# Patient Record
Sex: FEMALE | Race: WHITE | Employment: UNEMPLOYED | URBAN - METROPOLITAN AREA
[De-identification: names, ages, dates, MRNs, and addresses within clinical notes are randomized per-mention and may not be internally consistent; named-entity substitution may affect disease eponyms.]

---

## 2017-11-24 ENCOUNTER — TELEPHONE (OUTPATIENT)
Dept: FAMILY MEDICINE CLINIC | Age: 57
End: 2017-11-24

## 2017-11-24 ENCOUNTER — OFFICE VISIT (OUTPATIENT)
Dept: FAMILY MEDICINE CLINIC | Age: 57
End: 2017-11-24

## 2017-11-24 VITALS
RESPIRATION RATE: 18 BRPM | DIASTOLIC BLOOD PRESSURE: 84 MMHG | TEMPERATURE: 98.6 F | WEIGHT: 160 LBS | HEIGHT: 63 IN | OXYGEN SATURATION: 97 % | HEART RATE: 100 BPM | SYSTOLIC BLOOD PRESSURE: 141 MMHG | BODY MASS INDEX: 28.35 KG/M2

## 2017-11-24 DIAGNOSIS — Z76.89 ESTABLISHING CARE WITH NEW DOCTOR, ENCOUNTER FOR: ICD-10-CM

## 2017-11-24 DIAGNOSIS — G43.009 MIGRAINE WITHOUT AURA AND WITHOUT STATUS MIGRAINOSUS, NOT INTRACTABLE: Primary | ICD-10-CM

## 2017-11-24 DIAGNOSIS — R63.4 WEIGHT LOSS: ICD-10-CM

## 2017-11-24 DIAGNOSIS — R73.09 ELEVATED GLUCOSE: ICD-10-CM

## 2017-11-24 DIAGNOSIS — Z13.220 SCREENING CHOLESTEROL LEVEL: ICD-10-CM

## 2017-11-24 DIAGNOSIS — R79.89 ELEVATED LFTS: ICD-10-CM

## 2017-11-24 RX ORDER — FLUTICASONE PROPIONATE 50 MCG
2 SPRAY, SUSPENSION (ML) NASAL DAILY
COMMUNITY
End: 2018-09-16

## 2017-11-24 NOTE — MR AVS SNAPSHOT
Visit Information Date & Time Provider Department Dept. Phone Encounter #  
 11/24/2017 10:30 AM Grover Lei DO  [address] [facility] [phone] [id number] Your Appointments 12/8/2017  2:00 PM  
COMPLETE PHYSICAL with Farooq Anders DO [facility] ([facility]) Appt Note: well woman [address] [facility],[place] [address] [facility]  
[phone]  
  
   
 [address] [facility],[place] [address] [facility] [phone] [id number] Upcoming Health Maintenance Date Due DTaP/Tdap/Td series (1 - Tdap) 9/17/1981 PAP AKA CERVICAL CYTOLOGY 9/17/1981 BREAST CANCER SCRN MAMMOGRAM 9/17/2010 FOBT Q 1 YEAR AGE 50-75 9/17/2010 Allergies as of 11/24/2017  Review Complete On: 11/24/2017 By: Grover Lei DO Severity Noted Reaction Type Reactions Latex Medium 08/10/2012   Systemic Itching  
 inflammation Augmentin [Amoxicillin-pot Clavulanate]  11/11/2014    Hives Current Immunizations  Never Reviewed No immunizations on file. Not reviewed this visit You Were Diagnosed With   
  
 Codes Comments Migraine without aura and without status migrainosus, not intractable    -  Primary ICD-10-CM: G43.009 ICD-9-CM: 346.10 Elevated LFTs     ICD-10-CM: R79.89 ICD-9-CM: 790.6 Screening cholesterol level     ICD-10-CM: R56.336 ICD-9-CM: V77.91 Elevated glucose     ICD-10-CM: R73.09 
ICD-9-CM: 790.29 Weight loss     ICD-10-CM: R63.4 ICD-9-CM: 783.21 Establishing care with new doctor, encounter for     ICD-10-CM: Z76.89 
ICD-9-CM: V65.8 Vitals BP Pulse Temp Resp Height(growth percentile) Weight(growth percentile) 141/84 (BP 1 Location: Right arm, BP Patient Position: Sitting) 100 98.6 °F (37 °C) (Oral) 18 5' 3\" (1.6 m) 160 lb (72.6 kg) SpO2 BMI OB Status Smoking Status 97% 28.34 kg/m2 Postmenopausal Former Smoker Vitals History BMI and BSA Data Body Mass Index Body Surface Area 28.34 kg/m 2 1.8 m 2 Preferred Pharmacy Pharmacy Name Phone FOOD [facility] [address] [facility] [address] [facility] [id number] [facility] [phone] Your Updated Medication List  
  
   
This list is accurate as of: 11/24/17 11:08 AM.  Always use your most recent med list.  
  
  
  
  
 aspirin-acetaminophen-caffeine 250-250-65 mg per tablet Commonly known as:  EXCEDRIN ES Take 1 Tab by mouth every six (6) hours as needed for Pain. dihydroergotamine 0.5 mg/pump act. (4 mg/mL) nasal spray Commonly known as:  MIGRANAL  
1 spray by Nasal route as needed for Migraine. use in one nostril as directed. No more than 4 sprays in one hour FLONASE 50 mcg/actuation nasal spray Generic drug:  fluticasone 2 Sprays by Both Nostrils route daily. ZEGERID OTC 20-1.1 mg-gram capsule Generic drug:  omeprazole-sodium bicarbonate Take 1 tablet by mouth daily. Prescriptions Printed Refills  
 aspirin-acetaminophen-caffeine (EXCEDRIN ES) 250-250-65 mg per tablet 1 Sig: Take 1 Tab by mouth every six (6) hours as needed for Pain. Class: Print Route: Oral  
  
We Performed the Following HEMOGLOBIN A1C WITH EAG [34647 CPT(R)] LIPID PANEL [75636 CPT(R)] METABOLIC PANEL, COMPREHENSIVE [70442 CPT(R)] TSH RFX ON ABNORMAL TO FREE T4 [[id number] Custom] Patient Instructions Learning About Colonoscopy What is a colonoscopy? A colonoscopy is a test (also called a procedure) that lets a doctor look inside your large intestine. The doctor uses a thin, lighted tube called a colonoscope. The doctor uses it to look for small growths called polyps, colon or rectal cancer (colorectal cancer), or other problems like bleeding. During the procedure, the doctor can take samples of tissue. The samples can then be checked for cancer or other conditions. The doctor can also take out polyps. How is colonoscopy done? This procedure is done in a doctor's office or a clinic or hospital. You will get medicine to help you relax and not feel pain. Some people find that they do not remember having the test because of the medicine. The doctor gently moves the colonoscope, or scope, through the colon. The scope is also a small video camera. It lets the doctor see the colon and take pictures. A colonoscopy usually takes 30 to 45 minutes. It may take longer if the doctor has to remove polyps. How do you prepare for the procedure? You need to clean out your colon before the procedure so the doctor can see all of your colon. You may start the cleaning process a day or two before the test. This depends on which \"colon prep\" your doctor recommends. To clean your colon, you stop eating solid foods and drink only clear liquids. You can have water, tea, coffee, clear juices, clear broths, flavored ice pops, and gelatin (such as Jell-O). Do not drink anything red or purple, such as grape juice or fruit punch. And do not eat red or purple foods, such as grape ice pops or cherry gelatin. The day or night before the procedure, you drink a large amount of a special liquid. This causes loose, frequent stools. You will go to the bathroom a lot. It is very important to drink all of the colon prep liquid. If you have problems drinking the liquid, call your doctor. For many people, the prep is worse than the test. It may be uncomfortable, and you may feel hungry on the clear liquid diet. Some people do not go to work or do their usual activities on the day of the prep. Arrange to have someone take you home after the test. 
What can you expect after a colonoscopy? The nurses will watch you for 1 to 2 hours until the medicines wear off. Then you can go home. You will need a ride. Your doctor will tell you when you can eat and do your usual activities.  
Your doctor will talk to you about when you will need your next colonoscopy. The results of your test and your risk for colorectal cancer will help your doctor decide how often you need to be checked. Follow-up care is a key part of your treatment and safety. Be sure to make and go to all appointments, and call your doctor if you are having problems. It's also a good idea to know your test results and keep a list of the medicines you take. Where can you learn more? Go to http://raz-gabe.info/. Enter W271 in the search box to learn more about \"Learning About Colonoscopy. \" Current as of: May 12, 2017 Content Version: 11.4 © 2822-6596 [employer]. Care instructions adapted under license by [employer] (which disclaims liability or warranty for this information). If you have questions about a medical condition or this instruction, always ask your healthcare professional. [address] any warranty or liability for your use of this information. Introducing [facility]! David Doherty introduces Teamleader patient portal. Now you can access parts of your medical record, email your doctor's office, and request medication refills online. 1. In your internet browser, go to [email or web address]. Oncoscope/[employer] 2. Click on the First Time User? Click Here link in the Sign In box. You will see the New Member Sign Up page. 3. Enter your Teamleader Access Code exactly as it appears below. You will not need to use this code after youve completed the sign-up process. If you do not sign up before the expiration date, you must request a new code. · Teamleader Access Code: [email or web address] Expires: 2/22/2018 10:32 AM 
 
4. Enter the last four digits of your Social Security Number (xxxx) and Date of Birth (mm/dd/yyyy) as indicated and click Submit. You will be taken to the next sign-up page. 5. Create a Teamleader ID.  This will be your Teamleader login ID and cannot be changed, so think of one that is secure and easy to remember. 6. Create a VIAP password. You can change your password at any time. 7. Enter your Password Reset Question and Answer. This can be used at a later time if you forget your password. 8. Enter your e-mail address. You will receive e-mail notification when new information is available in [address] 9. Click Sign Up. You can now view and download portions of your medical record. 10. Click the Download Summary menu link to download a portable copy of your medical information. If you have questions, please visit the Frequently Asked Questions section of the VIAP website. Remember, VIAP is NOT to be used for urgent needs. For medical emergencies, dial 911. Now available from your iPhone and Android! Please provide this summary of care documentation to your next provider. Your primary care clinician is listed as Ludin Virk Iv. If you have any questions after today's visit, please call [phone].

## 2017-11-24 NOTE — PATIENT INSTRUCTIONS
Learning About Colonoscopy  What is a colonoscopy? A colonoscopy is a test (also called a procedure) that lets a doctor look inside your large intestine. The doctor uses a thin, lighted tube called a colonoscope. The doctor uses it to look for small growths called polyps, colon or rectal cancer (colorectal cancer), or other problems like bleeding. During the procedure, the doctor can take samples of tissue. The samples can then be checked for cancer or other conditions. The doctor can also take out polyps. How is colonoscopy done? This procedure is done in a doctor's office or a clinic or hospital. You will get medicine to help you relax and not feel pain. Some people find that they do not remember having the test because of the medicine. The doctor gently moves the colonoscope, or scope, through the colon. The scope is also a small video camera. It lets the doctor see the colon and take pictures. A colonoscopy usually takes 30 to 45 minutes. It may take longer if the doctor has to remove polyps. How do you prepare for the procedure? You need to clean out your colon before the procedure so the doctor can see all of your colon. You may start the cleaning process a day or two before the test. This depends on which \"colon prep\" your doctor recommends. To clean your colon, you stop eating solid foods and drink only clear liquids. You can have water, tea, coffee, clear juices, clear broths, flavored ice pops, and gelatin (such as Jell-O). Do not drink anything red or purple, such as grape juice or fruit punch. And do not eat red or purple foods, such as grape ice pops or cherry gelatin. The day or night before the procedure, you drink a large amount of a special liquid. This causes loose, frequent stools. You will go to the bathroom a lot. It is very important to drink all of the colon prep liquid. If you have problems drinking the liquid, call your doctor.   For many people, the prep is worse than the test. It may be uncomfortable, and you may feel hungry on the clear liquid diet. Some people do not go to work or do their usual activities on the day of the prep. Arrange to have someone take you home after the test.  What can you expect after a colonoscopy? The nurses will watch you for 1 to 2 hours until the medicines wear off. Then you can go home. You will need a ride. Your doctor will tell you when you can eat and do your usual activities. Your doctor will talk to you about when you will need your next colonoscopy. The results of your test and your risk for colorectal cancer will help your doctor decide how often you need to be checked. Follow-up care is a key part of your treatment and safety. Be sure to make and go to all appointments, and call your doctor if you are having problems. It's also a good idea to know your test results and keep a list of the medicines you take. Where can you learn more? Go to http://raz-gabe.info/. Enter C207 in the search box to learn more about \"Learning About Colonoscopy. \"  Current as of: May 12, 2017  Content Version: 11.4  © 6008-1842 [employer]. Care instructions adapted under license by [employer] (which disclaims liability or warranty for this information). If you have questions about a medical condition or this instruction, always ask your healthcare professional. [address] any warranty or liability for your use of this information.

## 2017-11-24 NOTE — PROGRESS NOTES
HPI:  Radha Haro is a 62 y.o. female presenting for well woman exam.     Migraines: better since menopause, has had vomiting with headaches in the past, frequently gets nauseated. Get sensitive to noise and light. Was told her liver numbers were high in the past.  Tested for hepatitis and negative. Rare use of tylenol about 2 times a year. Drinks about 3-4 drinks a year. No supplements. Drinks herbal teas occasionally. Elevated BP: when she checks it at the grocery store it's always normal, headaches makes it go up as well as at the doctors office. Has a headache today. Lost 40# in 2 years. Stopped drinking soda and started drinking a lot of water. Doesn't exercise. Allergies- reviewed: Allergies   Allergen Reactions    Latex Itching     inflammation    Augmentin [Amoxicillin-Pot Clavulanate] Hives         Medications- reviewed:   Current Outpatient Prescriptions   Medication Sig    fluticasone (FLONASE) 50 mcg/actuation nasal spray 2 Sprays by Both Nostrils route daily.  aspirin-acetaminophen-caffeine (EXCEDRIN ES) 250-250-65 mg per tablet Take 1 Tab by mouth every six (6) hours as needed for Pain.  omeprazole-sodium bicarbonate (ZEGERID OTC) 20-1.1 mg-gram cap Take 1 tablet by mouth daily.  dihydroergotamine (MIGRANAL) 0.5 mg/pump act. (4 mg/mL) nasal spray 1 spray by Nasal route as needed for Migraine. use in one nostril as directed. No more than 4 sprays in one hour     No current facility-administered medications for this visit. Past Medical History- reviewed:  Past Medical History:   Diagnosis Date    Sarcoidosis     Stickler's syndrome     daughter has-pt may have (genetic connective tissue disorder)     Dx Sarcoid 1991    Past Surgical History- reviewed:   Past Surgical History:   Procedure Laterality Date    HX ORTHOPAEDIC Right     wrist         Family History - reviewed:  History reviewed. No pertinent family history.       Social History - reviewed:  Social History     Social History    Marital status:      Spouse name: N/A    Number of children: N/A    Years of education: N/A     Occupational History    Not on file. Social History Main Topics    Smoking status: Former Smoker    Smokeless tobacco: Never Used    Alcohol use No    Drug use: No    Sexual activity: Yes     Partners: Male     Other Topics Concern    Not on file     Social History Narrative         Immunizations - reviewed: There is no immunization history on file for this patient. Flu: declines   Tdap: not sure, but >10 years, wants today       Health Maintenance reviewed -  Pap smear \"been awhile\"   Mammogram \"been awhile\"   Colonoscopy has done the cards in the past, has never had a colonoscopy   HIV testing neg in the past   Hepatitis C testing neg in the past       Review of Systems   CONSTITUTIONAL: Denies: fever, chills  ENT: allergy symptoms, congestion   NEURO: headache  BREASTS: No masses or nipple discharge      Physical Exam  Visit Vitals    /84 (BP 1 Location: Right arm, BP Patient Position: Sitting)    Pulse 100    Temp 98.6 °F (37 °C) (Oral)    Resp 18    Ht 5' 3\" (1.6 m)    Wt 160 lb (72.6 kg)    SpO2 97%    BMI 28.34 kg/m2       General appearance - alert, well appearing, and in no distress  Neck - supple, no significant adenopathy, thyroid exam: thyroid is normal in size without nodules or tenderness  Chest - clear to auscultation, no wheezes, rales or rhonchi, symmetric air entry  Heart - normal rate, regular rhythm, normal S1, S2, no murmurs, rubs, clicks or gallops  Neurological - alert, oriented, normal speech, no focal findings or movement disorder noted  Extremities - no pedal edema noted    Assessment/Plan:    ICD-10-CM ICD-9-CM    1. Migraine without aura and without status migrainosus, not intractable G43.009 346.10 aspirin-acetaminophen-caffeine (EXCEDRIN ES) 250-250-65 mg per tablet   2.  Elevated LFTs R79.89 790.6 METABOLIC PANEL, COMPREHENSIVE   3. Screening cholesterol level Z13.220 V77.91 LIPID PANEL   4. Elevated glucose R73.09 790.29 HEMOGLOBIN A1C WITH EAG   5. Weight loss R63.4 783.21 TSH RFX ON ABNORMAL TO FREE T4   6. Establishing care with new doctor, encounter for Z76.89 V65.8      · Will return for a wellness exam, labs today and will go over them at next visit  · Hx of migraines, states she has tried several preventative meds as well as Imitrex without luck. Refinery29an Spine has worked well with [facility] in the past.  Will hold off on Katheran Spine for now until LFTs back. Okay to use Fioricet in moderation for now, but if LFTs significantly elevated will have to find another option given the tylenol component. · At wellness visit will need: pap, colonoscopy referral, mammogram, tdap, (declines flu)      Follow-up Disposition: Not on File      I have discussed the diagnosis with the patient and the intended plan as seen in the above orders. The patient has received an after-visit summary and questions were answered concerning future plans. I have discussed medication side effects and warnings with the patient as well. Informed pt to return to the office if new symptoms arise.       Farooq Anders, DO

## 2017-11-24 NOTE — TELEPHONE ENCOUNTER
[facility],  [phone]    Called to say the patient told them that the medication which was to have been ordered is fioricet and not aspirin as that is over the counter.     aspirin-acetaminophen-caffeine (EXCEDRIN ES) 250-250-65 mg per tablet

## 2017-11-24 NOTE — PROGRESS NOTES
Chief Complaint   Patient presents with   [facility]     1. Have you been to the ER, urgent care clinic since your last visit? Hospitalized since your last visit? No    2. Have you seen or consulted any other health care providers outside of the [address] [place] [address] since your last visit? Include any pap smears or colon screening.  No

## 2017-11-25 LAB
ALBUMIN SERPL-MCNC: 4.5 G/DL (ref 3.5–5.5)
ALBUMIN/GLOB SERPL: 1.5 {RATIO} (ref 1.2–2.2)
ALP SERPL-CCNC: 123 IU/L (ref 39–117)
ALT SERPL-CCNC: 24 IU/L (ref 0–32)
AST SERPL-CCNC: 20 IU/L (ref 0–40)
BILIRUB SERPL-MCNC: 0.6 MG/DL (ref 0–1.2)
BUN SERPL-MCNC: 8 MG/DL (ref 6–24)
BUN/CREAT SERPL: 14 (ref 9–23)
CALCIUM SERPL-MCNC: 9.9 MG/DL (ref 8.7–10.2)
CHLORIDE SERPL-SCNC: 95 MMOL/L (ref 96–106)
CHOLEST SERPL-MCNC: 241 MG/DL (ref 100–199)
CO2 SERPL-SCNC: 28 MMOL/L (ref 18–29)
CREAT SERPL-MCNC: 0.56 MG/DL (ref 0.57–1)
EST. AVERAGE GLUCOSE BLD GHB EST-MCNC: 318 MG/DL
GFR SERPLBLD CREATININE-BSD FMLA CKD-EPI: 104 ML/MIN/1.73
GFR SERPLBLD CREATININE-BSD FMLA CKD-EPI: 120 ML/MIN/1.73
GLOBULIN SER CALC-MCNC: 3 G/DL (ref 1.5–4.5)
GLUCOSE SERPL-MCNC: 314 MG/DL (ref 65–99)
HBA1C MFR BLD: 12.7 % (ref 4.8–5.6)
HDLC SERPL-MCNC: 45 MG/DL
INTERPRETATION, 910389: NORMAL
LDLC SERPL CALC-MCNC: 128 MG/DL (ref 0–99)
POTASSIUM SERPL-SCNC: 5.5 MMOL/L (ref 3.5–5.2)
PROT SERPL-MCNC: 7.5 G/DL (ref 6–8.5)
SODIUM SERPL-SCNC: 138 MMOL/L (ref 134–144)
TRIGL SERPL-MCNC: 341 MG/DL (ref 0–149)
TSH SERPL DL<=0.005 MIU/L-ACNC: 0.82 UIU/ML (ref 0.45–4.5)
VLDLC SERPL CALC-MCNC: 68 MG/DL (ref 5–40)

## 2017-11-27 NOTE — PROGRESS NOTES
Newly diagnosed diabetic. Potassium slightly elevated. Will plan to recheck at visit next week. ATtempted to call pt to discuss but no answer, left voicemail. Will send copy of labs with explanation in letter in the mail.

## 2017-12-04 ENCOUNTER — TELEPHONE (OUTPATIENT)
Dept: FAMILY MEDICINE CLINIC | Age: 57
End: 2017-12-04

## 2017-12-04 NOTE — TELEPHONE ENCOUNTER
Patient calling she never got her results in the mail even though we are showing the Dr mailed it out, She would like the Dr or Nurse to call her to discuss the results, thank you.     Call patient at [phone]

## 2017-12-05 ENCOUNTER — HOSPITAL ENCOUNTER (EMERGENCY)
Age: 57
Discharge: HOME | End: 2017-12-06
Attending: EMERGENCY MEDICINE | Admitting: EMERGENCY MEDICINE
Payer: COMMERCIAL

## 2017-12-05 ENCOUNTER — APPOINTMENT (OUTPATIENT)
Dept: GENERAL RADIOLOGY | Age: 57
End: 2017-12-05
Attending: EMERGENCY MEDICINE
Payer: COMMERCIAL

## 2017-12-05 VITALS
SYSTOLIC BLOOD PRESSURE: 116 MMHG | BODY MASS INDEX: 28.35 KG/M2 | RESPIRATION RATE: 16 BRPM | HEART RATE: 95 BPM | DIASTOLIC BLOOD PRESSURE: 81 MMHG | HEIGHT: 63 IN | TEMPERATURE: 98.4 F | WEIGHT: 160 LBS | OXYGEN SATURATION: 96 %

## 2017-12-05 DIAGNOSIS — G43.809 OTHER MIGRAINE WITHOUT STATUS MIGRAINOSUS, NOT INTRACTABLE: Primary | ICD-10-CM

## 2017-12-05 LAB
ALBUMIN SERPL-MCNC: 3.9 G/DL (ref 3.5–5)
ALBUMIN/GLOB SERPL: 1 {RATIO} (ref 1.1–2.2)
ALP SERPL-CCNC: 115 U/L (ref 45–117)
ALT SERPL-CCNC: 37 U/L (ref 12–78)
ANION GAP SERPL CALC-SCNC: 12 MMOL/L (ref 5–15)
AST SERPL-CCNC: 20 U/L (ref 15–37)
BASOPHILS # BLD: 0 K/UL (ref 0–0.1)
BASOPHILS NFR BLD: 1 % (ref 0–1)
BILIRUB SERPL-MCNC: 0.5 MG/DL (ref 0.2–1)
BUN SERPL-MCNC: 10 MG/DL (ref 6–20)
BUN/CREAT SERPL: 17 (ref 12–20)
CALCIUM SERPL-MCNC: 9.1 MG/DL (ref 8.5–10.1)
CHLORIDE SERPL-SCNC: 96 MMOL/L (ref 97–108)
CO2 SERPL-SCNC: 27 MMOL/L (ref 21–32)
CREAT SERPL-MCNC: 0.6 MG/DL (ref 0.55–1.02)
D DIMER PPP FEU-MCNC: 0.22 MG/L FEU (ref 0–0.65)
EOSINOPHIL # BLD: 0.2 K/UL (ref 0–0.4)
EOSINOPHIL NFR BLD: 2 % (ref 0–7)
ERYTHROCYTE [DISTWIDTH] IN BLOOD BY AUTOMATED COUNT: 12.9 % (ref 11.5–14.5)
GLOBULIN SER CALC-MCNC: 3.8 G/DL (ref 2–4)
GLUCOSE SERPL-MCNC: 288 MG/DL (ref 65–100)
HCT VFR BLD AUTO: 42.2 % (ref 35–47)
HGB BLD-MCNC: 14.7 G/DL (ref 11.5–16)
LYMPHOCYTES # BLD: 2.9 K/UL (ref 0.8–3.5)
LYMPHOCYTES NFR BLD: 45 % (ref 12–49)
MCH RBC QN AUTO: 28.9 PG (ref 26–34)
MCHC RBC AUTO-ENTMCNC: 34.8 G/DL (ref 30–36.5)
MCV RBC AUTO: 82.9 FL (ref 80–99)
MONOCYTES # BLD: 0.3 K/UL (ref 0–1)
MONOCYTES NFR BLD: 5 % (ref 5–13)
NEUTS SEG # BLD: 3 K/UL (ref 1.8–8)
NEUTS SEG NFR BLD: 47 % (ref 32–75)
PLATELET # BLD AUTO: 199 K/UL (ref 150–400)
POTASSIUM SERPL-SCNC: 3.8 MMOL/L (ref 3.5–5.1)
PROT SERPL-MCNC: 7.7 G/DL (ref 6.4–8.2)
RBC # BLD AUTO: 5.09 M/UL (ref 3.8–5.2)
SODIUM SERPL-SCNC: 135 MMOL/L (ref 136–145)
TROPONIN I SERPL-MCNC: <0.04 NG/ML
WBC # BLD AUTO: 6.5 K/UL (ref 3.6–11)

## 2017-12-05 PROCEDURE — 74011250636 HC RX REV CODE- 250/636: Performed by: EMERGENCY MEDICINE

## 2017-12-05 PROCEDURE — 84484 ASSAY OF TROPONIN QUANT: CPT | Performed by: EMERGENCY MEDICINE

## 2017-12-05 PROCEDURE — 71020 XR CHEST PA LAT: CPT

## 2017-12-05 PROCEDURE — 96365 THER/PROPH/DIAG IV INF INIT: CPT

## 2017-12-05 PROCEDURE — 96361 HYDRATE IV INFUSION ADD-ON: CPT

## 2017-12-05 PROCEDURE — 99283 EMERGENCY DEPT VISIT LOW MDM: CPT

## 2017-12-05 PROCEDURE — 36415 COLL VENOUS BLD VENIPUNCTURE: CPT | Performed by: EMERGENCY MEDICINE

## 2017-12-05 PROCEDURE — 85025 COMPLETE CBC W/AUTO DIFF WBC: CPT | Performed by: EMERGENCY MEDICINE

## 2017-12-05 PROCEDURE — 96375 TX/PRO/DX INJ NEW DRUG ADDON: CPT

## 2017-12-05 PROCEDURE — 93005 ELECTROCARDIOGRAM TRACING: CPT

## 2017-12-05 PROCEDURE — 80053 COMPREHEN METABOLIC PANEL: CPT | Performed by: EMERGENCY MEDICINE

## 2017-12-05 PROCEDURE — 85379 FIBRIN DEGRADATION QUANT: CPT | Performed by: EMERGENCY MEDICINE

## 2017-12-05 RX ORDER — MAGNESIUM SULFATE HEPTAHYDRATE 40 MG/ML
2 INJECTION, SOLUTION INTRAVENOUS
Status: COMPLETED | OUTPATIENT
Start: 2017-12-05 | End: 2017-12-05

## 2017-12-05 RX ORDER — DEXAMETHASONE SODIUM PHOSPHATE 10 MG/ML
10 INJECTION INTRAMUSCULAR; INTRAVENOUS
Status: COMPLETED | OUTPATIENT
Start: 2017-12-05 | End: 2017-12-05

## 2017-12-05 RX ORDER — PROCHLORPERAZINE EDISYLATE 5 MG/ML
10 INJECTION INTRAMUSCULAR; INTRAVENOUS
Status: COMPLETED | OUTPATIENT
Start: 2017-12-05 | End: 2017-12-05

## 2017-12-05 RX ADMIN — MAGNESIUM SULFATE IN WATER 2 G: 40 INJECTION, SOLUTION INTRAVENOUS at 21:41

## 2017-12-05 RX ADMIN — DEXAMETHASONE SODIUM PHOSPHATE 10 MG: 10 INJECTION, SOLUTION INTRAMUSCULAR; INTRAVENOUS at 21:41

## 2017-12-05 RX ADMIN — SODIUM CHLORIDE 1000 ML: 900 INJECTION, SOLUTION INTRAVENOUS at 21:41

## 2017-12-05 RX ADMIN — PROCHLORPERAZINE EDISYLATE 10 MG: 5 INJECTION INTRAMUSCULAR; INTRAVENOUS at 21:41

## 2017-12-05 NOTE — TELEPHONE ENCOUNTER
Called patient and she states she already received letter. She did not have any questions.  Patient states she has an appointment already with Dr. Shantelle Neely.

## 2017-12-06 LAB
ATRIAL RATE: 100 BPM
CALCULATED P AXIS, ECG09: 39 DEGREES
CALCULATED R AXIS, ECG10: -12 DEGREES
CALCULATED T AXIS, ECG11: 48 DEGREES
DIAGNOSIS, 93000: NORMAL
P-R INTERVAL, ECG05: 152 MS
Q-T INTERVAL, ECG07: 336 MS
QRS DURATION, ECG06: 74 MS
QTC CALCULATION (BEZET), ECG08: 433 MS
VENTRICULAR RATE, ECG03: 100 BPM

## 2017-12-06 NOTE — ED NOTES
Continues with pain to frontal and occipital head but improved, denies nausea, ambulated to BR steadily, fluid bolus in process.  Will d/c once fluids complete

## 2017-12-06 NOTE — ED NOTES
10:09 PM  Change of shift. Care of patient taken over from Dr. Corby Mitchell; H&P reviewed, handoff complete. Awaiting labs/imaging/consultant. 10:51 PM  Pt feeling better. Discussed results and plan with patient. Patient will be discharged home with PCP and neurology followup. Patient instructed to return to the emergency room for any worsening symptoms or any other concerns.

## 2017-12-06 NOTE — DISCHARGE INSTRUCTIONS
Migraine Headache: Care Instructions  Your Care Instructions  Migraines are painful, throbbing headaches that often start on one side of the head. They may cause nausea and vomiting and make you sensitive to light, sound, or smell. Without treatment, migraines can last from 4 hours to a few days. Medicines can help prevent migraines or stop them after they have started. Your doctor can help you find which ones work best for you. Follow-up care is a key part of your treatment and safety. Be sure to make and go to all appointments, and call your doctor if you are having problems. It's also a good idea to know your test results and keep a list of the medicines you take. How can you care for yourself at home? · Do not drive if you have taken a prescription pain medicine. · Rest in a quiet, dark room until your headache is gone. Close your eyes, and try to relax or go to sleep. Don't watch TV or read. · Put a cold, moist cloth or cold pack on the painful area for 10 to 20 minutes at a time. Put a thin cloth between the cold pack and your skin. · Use a warm, moist towel or a heating pad set on low to relax tight shoulder and neck muscles. · Have someone gently massage your neck and shoulders. · Take your medicines exactly as prescribed. Call your doctor if you think you are having a problem with your medicine. You will get more details on the specific medicines your doctor prescribes. · Be careful not to take pain medicine more often than the instructions allow. You could get worse or more frequent headaches when the medicine wears off. To prevent migraines  · Keep a headache diary so you can figure out what triggers your headaches. Avoiding triggers may help you prevent headaches. Record when each headache began, how long it lasted, and what the pain was like.  (Was it throbbing, aching, stabbing, or dull?) Write down any other symptoms you had with the headache, such as nausea, flashing lights or dark spots, or sensitivity to bright light or loud noise. Note if the headache occurred near your period. List anything that might have triggered the headache. Triggers may include certain foods (chocolate, cheese, wine) or odors, smoke, bright light, stress, or lack of sleep. · If your doctor has prescribed medicine for your migraines, take it as directed. You may have medicine that you take only when you get a migraine and medicine that you take all the time to help prevent migraines. ¨ If your doctor has prescribed medicine for when you get a headache, take it at the first sign of a migraine, unless your doctor has given you other instructions. ¨ If your doctor has prescribed medicine to prevent migraines, take it exactly as prescribed. Call your doctor if you think you are having a problem with your medicine. · Find healthy ways to deal with stress. Migraines are most common during or right after stressful times. Take time to relax before and after you do something that has caused a migraine in the past.  · Try to keep your muscles relaxed by keeping good posture. Check your jaw, face, neck, and shoulder muscles for tension. Try to relax them. When you sit at a desk, change positions often. And make sure to stretch for 30 seconds each hour. · Get plenty of sleep and exercise. · Eat meals on a regular schedule. Avoid foods and drinks that often trigger migraines. These include chocolate, alcohol (especially red wine and port), aspartame, monosodium glutamate (MSG), and some additives found in foods (such as hot dogs, zurita, cold cuts, aged cheeses, and pickled foods). · Limit caffeine. Don't drink too much coffee, tea, or soda. But don't quit caffeine suddenly. That can also give you migraines. · Do not smoke or allow others to smoke around you. If you need help quitting, talk to your doctor about stop-smoking programs and medicines. These can increase your chances of quitting for good.   · If you are taking birth control pills or hormone therapy, talk to your doctor about whether they are triggering your migraines. When should you call for help? Call 911 anytime you think you may need emergency care. For example, call if:  ? · You have signs of a stroke. These may include:  ¨ Sudden numbness, paralysis, or weakness in your face, arm, or leg, especially on only one side of your body. ¨ Sudden vision changes. ¨ Sudden trouble speaking. ¨ Sudden confusion or trouble understanding simple statements. ¨ Sudden problems with walking or balance. ¨ A sudden, severe headache that is different from past headaches. ?Call your doctor now or seek immediate medical care if:  ? · You have new or worse nausea and vomiting. ? · You have a new or higher fever. ? · Your headache gets much worse. ? Watch closely for changes in your health, and be sure to contact your doctor if:  ? · You are not getting better after 2 days (48 hours). Where can you learn more? Go to http://raz-gabe.info/. Enter J337 in the search box to learn more about \"Migraine Headache: Care Instructions. \"  Current as of: October 14, 2016  Content Version: 11.4  © 1267-9038 [employer]. Care instructions adapted under license by [employer] (which disclaims liability or warranty for this information). If you have questions about a medical condition or this instruction, always ask your healthcare professional. [place] [address] any warranty or liability for your use of this information.

## 2017-12-06 NOTE — ED PROVIDER NOTES
HPI Comments: Ms. Kala Dykes is a 55-year-old female with past medical history sarcoidosis, migraine headaches, presenting with complaints of gradual onset by frontal headache, but his began to involve the posterior regions, achy in nature, associated with nausea, vomiting, without auditory or visual changes, patient complains of photophobia and phonophobia. She states that headache is typical of severe headaches, however worse than usual, refractory to home Fioricet, and Excedrin which usually resolves her headache. She denies new trauma, denies bleeding disorders, fevers, visual disturbances or auditory disturbances as above. Patient is a 62 y.o. female presenting with headaches. The history is provided by the patient and the spouse. No  was used. Headache    This is a recurrent problem. The current episode started 6 to 12 hours ago. The problem occurs constantly. The problem has been gradually worsening. The headache is aggravated by photophobia, vomiting and nausea. The pain is located in the bilateral region. The quality of the pain is described as throbbing. The pain is at a severity of 10/10. The pain is severe. Associated symptoms include chest pressure, nausea and vomiting. Pertinent negatives include no anorexia, no fever, no malaise/fatigue, no near-syncope, no orthopnea, no palpitations, no syncope, no shortness of breath, no weakness, no tingling, no dizziness and no visual change. She has tried NSAIDs and oral narcotic analgesics for the symptoms. The treatment provided mild relief. Past Medical History:   Diagnosis Date    Neurological disorder     migraines    Sarcoidosis     Stickler's syndrome     daughter has-pt may have (genetic connective tissue disorder)       Past Surgical History:   Procedure Laterality Date    HX ORTHOPAEDIC Right     wrist         History reviewed. No pertinent family history.     Social History     Social History    Marital status:      Spouse name: N/A    Number of children: N/A    Years of education: N/A     Occupational History    Not on file. Social History Main Topics    Smoking status: Former Smoker    Smokeless tobacco: Never Used    Alcohol use No    Drug use: No    Sexual activity: Yes     Partners: Male     Other Topics Concern    Not on file     Social History Narrative         ALLERGIES: Latex and Augmentin [amoxicillin-pot clavulanate]    Review of Systems   Constitutional: Negative for activity change, chills, fever and malaise/fatigue. HENT: Negative for nosebleeds, sore throat, trouble swallowing and voice change. Eyes: Negative for visual disturbance. Respiratory: Positive for chest tightness. Negative for shortness of breath. Cardiovascular: Positive for chest pain. Negative for palpitations, orthopnea, syncope and near-syncope. Gastrointestinal: Positive for nausea and vomiting. Negative for abdominal pain, anorexia, constipation and diarrhea. Genitourinary: Negative for difficulty urinating, dysuria, hematuria and urgency. Musculoskeletal: Negative for back pain, neck pain and neck stiffness. Skin: Negative for color change. Allergic/Immunologic: Negative for immunocompromised state. Neurological: Positive for headaches. Negative for dizziness, tingling, tremors, seizures, syncope, facial asymmetry, speech difficulty, weakness, light-headedness and numbness. Psychiatric/Behavioral: Negative for behavioral problems, confusion, hallucinations, self-injury and suicidal ideas. Vitals:    12/05/17 2047   BP: (!) 169/97   Pulse: (!) 108   Resp: 18   Temp: 98.4 °F (36.9 °C)   SpO2: 97%   Weight: 72.6 kg (160 lb)   Height: 5' 3\" (1.6 m)            Physical Exam   Constitutional: She is oriented to person, place, and time. She appears well-developed and well-nourished. No distress. HENT:   Head: Normocephalic and atraumatic. Eyes: Pupils are equal, round, and reactive to light. Neck: Normal range of motion. Neck supple. Cardiovascular: Normal rate, regular rhythm and normal heart sounds. Exam reveals no gallop and no friction rub. No murmur heard. Pulmonary/Chest: Effort normal and breath sounds normal. No respiratory distress. She has no wheezes. Abdominal: Soft. Bowel sounds are normal. She exhibits no distension. There is no tenderness. There is no rebound and no guarding. Musculoskeletal: Normal range of motion. Neurological: She is alert and oriented to person, place, and time. She has normal strength. No cranial nerve deficit or sensory deficit. GCS eye subscore is 4. GCS verbal subscore is 5. GCS motor subscore is 6. Skin: Skin is warm. No rash noted. She is not diaphoretic. Psychiatric: She has a normal mood and affect. Her behavior is normal. Judgment and thought content normal.   Nursing note and vitals reviewed. MDM  ED Course     This is a 60-year-old female past medical history, review of systems, physical exam as above, presenting with complaints of gradual onset, headache, initially frontal, now posterior, or blurred to previous headaches. Headache is not acute in onset, patient is not febrile, patient states she really presents to emergency department for headaches. Physical exam is notable for uncomfortable-appearing elderly female, in no acute distress, with clear breath sounds auscultation, regular rate and rhythm without murmurs gallops or rubs, soft nontender abdomen, pulse equal reactive to light, nonfocal neurologic exam.  She does complain of some chest and, that she thinks is associated with her vomiting. Plan to obtain CMP, CBC, cardiac enzymes, provide my during cocktail, and reassess. Procedures    BESIDE SIGN OUT:  22:00 PM  Discussed pt's hx, disposition, and available diagnostic and imaging results with Dr. Jennifer Goodson at bedside with the patient. Reviewed care plans. Both providers and patient are in agreement with care plan.   Rylie Guillaume is transferring care of the pt to Dr. Erlin Stokes at this time.

## 2017-12-06 NOTE — ED TRIAGE NOTES
Pt arrives with c/o of headache onset 14:00, chest pain/tightness for one hour accompanied by nausea.

## 2017-12-11 ENCOUNTER — OFFICE VISIT (OUTPATIENT)
Dept: FAMILY MEDICINE CLINIC | Age: 57
End: 2017-12-11

## 2017-12-11 ENCOUNTER — TELEPHONE (OUTPATIENT)
Dept: FAMILY MEDICINE CLINIC | Age: 57
End: 2017-12-11

## 2017-12-11 VITALS
WEIGHT: 158.6 LBS | RESPIRATION RATE: 16 BRPM | TEMPERATURE: 98.5 F | OXYGEN SATURATION: 93 % | DIASTOLIC BLOOD PRESSURE: 89 MMHG | BODY MASS INDEX: 28.1 KG/M2 | HEART RATE: 109 BPM | HEIGHT: 63 IN | SYSTOLIC BLOOD PRESSURE: 143 MMHG

## 2017-12-11 DIAGNOSIS — Z01.419 ENCOUNTER FOR WELL WOMAN EXAM WITH ROUTINE GYNECOLOGICAL EXAM: Primary | ICD-10-CM

## 2017-12-11 DIAGNOSIS — Z12.9 CANCER SCREENING: ICD-10-CM

## 2017-12-11 DIAGNOSIS — Z23 ENCOUNTER FOR IMMUNIZATION: ICD-10-CM

## 2017-12-11 DIAGNOSIS — R03.0 ELEVATED BP WITHOUT DIAGNOSIS OF HYPERTENSION: ICD-10-CM

## 2017-12-11 DIAGNOSIS — D86.9 SARCOIDOSIS: ICD-10-CM

## 2017-12-11 DIAGNOSIS — E11.65 UNCONTROLLED TYPE 2 DIABETES MELLITUS WITH HYPERGLYCEMIA, WITHOUT LONG-TERM CURRENT USE OF INSULIN (HCC): ICD-10-CM

## 2017-12-11 DIAGNOSIS — G43.909 MIGRAINE WITHOUT STATUS MIGRAINOSUS, NOT INTRACTABLE, UNSPECIFIED MIGRAINE TYPE: ICD-10-CM

## 2017-12-11 DIAGNOSIS — N81.10 FEMALE BLADDER PROLAPSE: ICD-10-CM

## 2017-12-11 DIAGNOSIS — K21.9 GASTROESOPHAGEAL REFLUX DISEASE WITHOUT ESOPHAGITIS: ICD-10-CM

## 2017-12-11 DIAGNOSIS — E78.2 MIXED HYPERLIPIDEMIA: ICD-10-CM

## 2017-12-11 RX ORDER — INSULIN PUMP SYRINGE, 3 ML
EACH MISCELLANEOUS
Qty: 1 KIT | Refills: 0 | Status: SHIPPED | OUTPATIENT
Start: 2017-12-11 | End: 2018-09-16

## 2017-12-11 RX ORDER — METFORMIN HYDROCHLORIDE 500 MG/1
500 TABLET, EXTENDED RELEASE ORAL
Qty: 30 TAB | Refills: 0 | Status: SHIPPED | OUTPATIENT
Start: 2017-12-11 | End: 2017-12-18 | Stop reason: SDUPTHER

## 2017-12-11 RX ORDER — LANCETS
EACH MISCELLANEOUS
Qty: 1 EACH | Refills: 11 | Status: SHIPPED | OUTPATIENT
Start: 2017-12-11 | End: 2018-09-16

## 2017-12-11 NOTE — PATIENT INSTRUCTIONS
Vaccine Information Statement     Tdap (Tetanus, Diphtheria, Pertussis) Vaccine: What You Need to Know    Many Vaccine Information Statements are available in German and other languages. See www.immunize.org/vis. Hojas de Información Sobre Vacunas están disponibles en español y en muchos otros idiomas. Visite SilvioScale.si    1. Why get vaccinated? Tetanus, diphtheria, and pertussis are very serious diseases. Tdap vaccine can protect us from these diseases. And, Tdap vaccine given to pregnant women can protect  babies against pertussis. TETANUS (Lockjaw) is rare in the [facility] today. It causes painful muscle tightening and stiffness, usually all over the body.  It can lead to tightening of muscles in the head and neck so you cant open your mouth, swallow, or sometimes even breathe. Tetanus kills about 1 out of 10 people who are infected even after receiving the best medical care. DIPHTHERIA is also rare in the [facility] today. It can cause a thick coating to form in the back of the throat.  It can lead to breathing problems, heart failure, paralysis, and death. PERTUSSIS (Whooping Cough) causes severe coughing spells, which can cause difficulty breathing, vomiting, and disturbed sleep.  It can also lead to weight loss, incontinence, and rib fractures. Up to 2 in 100 adolescents and 5 in 100 adults with pertussis are hospitalized or have complications, which could include pneumonia or death. These diseases are caused by bacteria. Diphtheria and pertussis are spread from person to person through secretions from coughing or sneezing. Tetanus enters the body through cuts, scratches, or wounds. Before vaccines, as many as 200,000 cases of diphtheria, 200,000 cases of pertussis, and hundreds of cases of tetanus, were reported in the [place] each year.  Since vaccination began, reports of cases for tetanus and diphtheria have dropped by about 99% and for pertussis by about 80%. 2. Tdap vaccine    Tdap vaccine can protect adolescents and adults from tetanus, diphtheria, and pertussis. One dose of Tdap is routinely given at age 6 or 15. People who did not get Tdap at that age should get it as soon as possible. Tdap is especially important for health care professionals and anyone having close contact with a baby younger than 12 months. Pregnant women should get a dose of Tdap during every pregnancy, to protect the  from pertussis. Infants are most at risk for severe, life-threatening complications from pertussis. Another vaccine, called Td, protects against tetanus and diphtheria, but not pertussis. A Td booster should be given every 10 years. Tdap may be given as one of these boosters if you have never gotten Tdap before. Tdap may also be given after a severe cut or burn to prevent tetanus infection. Your doctor or the person giving you the vaccine can give you more information. Tdap may safely be given at the same time as other vaccines. 3. Some people should not get this vaccine     A person who has ever had a life-threatening allergic reaction after a previous dose of any diphtheria, tetanus or pertussis containing vaccine, OR has a severe allergy to any part of this vaccine, should not get Tdap vaccine. Tell the person giving the vaccine about any severe allergies.  Anyone who had coma or long repeated seizures within 7 days after a childhood dose of DTP or DTaP, or a previous dose of Tdap, should not get Tdap, unless a cause other than the vaccine was found. They can still get Td.  Talk to your doctor if you:  - have seizures or another nervous system problem,  - had severe pain or swelling after any vaccine containing diphtheria, tetanus or pertussis,   - ever had a condition called Guillain Barré Syndrome (GBS),  - arent feeling well on the day the shot is scheduled.     4. Risks    With any medicine, including vaccines, there is a chance of side effects. These are usually mild and go away on their own. Serious reactions are also possible but are rare. Most people who get Tdap vaccine do not have any problems with it. Mild Problems following Tdap  (Did not interfere with activities)   Pain where the shot was given (about 3 in 4 adolescents or 2 in 3 adults)   Redness or swelling where the shot was given (about 1 person in 5)   Mild fever of at least 100.4°F (up to about 1 in 25 adolescents or 1 in 100 adults)   Headache (about 3 or 4 people in 10)   Tiredness (about 1 person in 3 or 4)   Nausea, vomiting, diarrhea, stomach ache (up to 1 in 4 adolescents or 1 in 10 adults)   Chills,  sore joints (about 1 person in 10)   Body aches (about 1 person in 3 or 4)    Rash, swollen glands (uncommon)    Moderate Problems following Tdap  (Interfered with activities, but did not require medical attention)   Pain where the shot was given (up to 1 in 5 or 6)    Redness or swelling where the shot was given (up to about 1 in 16 adolescents or 1 in 12 adults)   Fever over 102°F (about 1 in 100 adolescents or 1 in 250 adults)   Headache (about 1 in 7 adolescents or 1 in 10 adults)   Nausea, vomiting, diarrhea, stomach ache (up to 1 or 3 people in 100)   Swelling of the entire arm where the shot was given (up to about 1 in 500). Severe Problems following Tdap  (Unable to perform usual activities; required medical attention)   Swelling, severe pain, bleeding, and redness in the arm where the shot was given (rare). Problems that could happen after any vaccine:     People sometimes faint after a medical procedure, including vaccination. Sitting or lying down for about 15 minutes can help prevent fainting, and injuries caused by a fall. Tell your doctor if you feel dizzy, or have vision changes or ringing in the ears.      Some people get severe pain in the shoulder and have difficulty moving the arm where a shot was given. This happens very rarely.  Any medication can cause a severe allergic reaction. Such reactions from a vaccine are very rare, estimated at fewer than 1 in a million doses, and would happen within a few minutes to a few hours after the vaccination. As with any medicine, there is a very remote chance of a vaccine causing a serious injury or death. The safety of vaccines is always being monitored. For more information, visit: www.cdc.gov/vaccinesafety/    5. What if there is a serious problem? What should I look for?  Look for anything that concerns you, such as signs of a severe allergic reaction, very high fever, or unusual behavior.  Signs of a severe allergic reaction can include hives, swelling of the face and throat, difficulty breathing, a fast heartbeat, dizziness, and weakness. These would usually start a few minutes to a few hours after the vaccination. What should I do?  If you think it is a severe allergic reaction or other emergency that cant wait, call 9-1-1 or get the person to the nearest hospital. Otherwise, call your doctor.  Afterward, the reaction should be reported to the Vaccine Adverse Event Reporting System (VAERS). Your doctor might file this report, or you can do it yourself through the VAERS web site at www.vaers. [facility].gov, or by calling 8-983.625.3933. [employer] does not give medical advice. 6. The National Vaccine Injury Compensation Program    The [facility] Vaccine Injury Compensation Program (VICP) is a federal program that was created to compensate people who may have been injured by certain vaccines. Persons who believe they may have been injured by a vaccine can learn about the program and about filing a claim by calling 5-877.771.3244 or visiting the [employer] website at www.[facility].gov/vaccinecompensation. There is a time limit to file a claim for compensation. 7. How can I learn more?  Ask your doctor.  He or she can give you the vaccine package insert or suggest other sources of information.  Call your local or state health department.  Contact the Centers for Disease Control and Prevention (CDC):  - Call 7-542.696.8312 (1-800-CDC-INFO) or  - Visit CDCs website at www.cdc.gov/vaccines      Vaccine Information Statement   Tdap Vaccine  (2/24/2015)  42 ALISHA Herrera [id number]    Department of Health and Human Services  Centers for Disease Control and Prevention    Office Use Only       Learning About Diabetes Food Guidelines  Your Care Instructions    Meal planning is important to manage diabetes. It helps keep your blood sugar at a target level (which you set with your doctor). You don't have to eat special foods. You can eat what your family eats, including sweets once in a while. But you do have to pay attention to how often you eat and how much you eat of certain foods. You may want to work with a dietitian or a certified diabetes educator (CDE) to help you plan meals and snacks. A dietitian or CDE can also help you lose weight if that is one of your goals. What should you know about eating carbs? Managing the amount of carbohydrate (carbs) you eat is an important part of healthy meals when you have diabetes. Carbohydrate is found in many foods. · Learn which foods have carbs. And learn the amounts of carbs in different foods. ¨ Bread, cereal, pasta, and rice have about 15 grams of carbs in a serving. A serving is 1 slice of bread (1 ounce), ½ cup of cooked cereal, or 1/3 cup of cooked pasta or rice. ¨ Fruits have 15 grams of carbs in a serving. A serving is 1 small fresh fruit, such as an apple or orange; ½ of a banana; ½ cup of cooked or canned fruit; ½ cup of fruit juice; 1 cup of melon or raspberries; or 2 tablespoons of dried fruit. ¨ Milk and no-sugar-added yogurt have 15 grams of carbs in a serving. A serving is 1 cup of milk or 2/3 cup of no-sugar-added yogurt. ¨ Starchy vegetables have 15 grams of carbs in a serving.  A serving is ½ cup of mashed potatoes or sweet potato; 1 cup winter squash; ½ of a small baked potato; ½ cup of cooked beans; or ½ cup cooked corn or green peas. · Learn how much carbs to eat each day and at each meal. A dietitian or CDE can teach you how to keep track of the amount of carbs you eat. This is called carbohydrate counting. · If you are not sure how to count carbohydrate grams, use the Plate Method to plan meals. It is a good, quick way to make sure that you have a balanced meal. It also helps you spread carbs throughout the day. ¨ Divide your plate by types of foods. Put non-starchy vegetables on half the plate, meat or other protein food on one-quarter of the plate, and a grain or starchy vegetable in the final quarter of the plate. To this you can add a small piece of fruit and 1 cup of milk or yogurt, depending on how many carbs you are supposed to eat at a meal.  · Try to eat about the same amount of carbs at each meal. Do not \"save up\" your daily allowance of carbs to eat at one meal.  · Proteins have very little or no carbs per serving. Examples of proteins are beef, chicken, turkey, fish, eggs, tofu, cheese, cottage cheese, and peanut butter. A serving size of meat is 3 ounces, which is about the size of a deck of cards. Examples of meat substitute serving sizes (equal to 1 ounce of meat) are 1/4 cup of cottage cheese, 1 egg, 1 tablespoon of peanut butter, and ½ cup of tofu. How can you eat out and still eat healthy? · Learn to estimate the serving sizes of foods that have carbohydrate. If you measure food at home, it will be easier to estimate the amount in a serving of restaurant food. · If the meal you order has too much carbohydrate (such as potatoes, corn, or baked beans), ask to have a low-carbohydrate food instead. Ask for a salad or green vegetables. · If you use insulin, check your blood sugar before and after eating out to help you plan how much to eat in the future.   · If you eat more carbohydrate at a meal than you had planned, take a walk or do other exercise. This will help lower your blood sugar. What else should you know? · Limit saturated fat, such as the fat from meat and dairy products. This is a healthy choice because people who have diabetes are at higher risk of heart disease. So choose lean cuts of meat and nonfat or low-fat dairy products. Use olive or canola oil instead of butter or shortening when cooking. · Don't skip meals. Your blood sugar may drop too low if you skip meals and take insulin or certain medicines for diabetes. · Check with your doctor before you drink alcohol. Alcohol can cause your blood sugar to drop too low. Alcohol can also cause a bad reaction if you take certain diabetes medicines. Follow-up care is a key part of your treatment and safety. Be sure to make and go to all appointments, and call your doctor if you are having problems. It's also a good idea to know your test results and keep a list of the medicines you take. Where can you learn more? Go to http://raz-gabe.info/. Enter A962 in the search box to learn more about \"Learning About Diabetes Food Guidelines. \"  Current as of: March 13, 2017  Content Version: 11.4  © 1061-8358 Healthwise, [employer]. Care instructions adapted under license by [employer] (which disclaims liability or warranty for this information). If you have questions about a medical condition or this instruction, always ask your healthcare professional. [place] [address] any warranty or liability for your use of this information. Learning About Type 2 Diabetes  What is type 2 diabetes? Insulin is a hormone that helps your body use sugar from your food as energy. Type 2 diabetes happens when your body can't use insulin the right way. Over time, the pancreas can't make enough insulin. If you don't have enough insulin, too much sugar stays in your blood.   If you are overweight, get little or no exercise, or have type 2 diabetes in your family, you are more likely to have problems with the way insulin works in your body.  Americans, Hispanics, Native Americans,  Americans, and Pacific Islanders have a higher risk for type 2 diabetes. Type 2 diabetes can be prevented or delayed with a healthy lifestyle, which includes staying at a healthy weight, making smart food choices, and getting regular exercise. What can you expect with type 2 diabetes? [place] keep hearing about how important it is to keep your blood sugar within a target range. That's because over time, high blood sugar can lead to serious problems. It can:  · Harm your eyes, nerves, and kidneys. · Damage your blood vessels, leading to heart disease and stroke. · Reduce blood flow and cause nerve damage to parts of your body, especially your feet. This can cause slow healing and pain when you walk. · Make your immune system weak and less able to fight infections. When people hear the word \"diabetes,\" they often think of problems like these. But daily care and treatment can help prevent or delay these problems. The goal is to keep your blood sugar in a target range. That's the best way to reduce your chance of having more problems from diabetes. What are the symptoms? Some people who have type 2 diabetes may not have any symptoms early on. Many people with the disease don't even know they have it at first. But with time, diabetes starts to cause symptoms. You experience most symptoms of type 2 diabetes when your blood sugar is either too high or too low. The most common symptoms of high blood sugar include:  · Thirst.  · Frequent urination. · Weight loss. · Blurry vision. The symptoms of low blood sugar include:  · Sweating. · Shakiness. · Weakness. · Hunger. · Confusion. How can you prevent type 2 diabetes?   The best way to prevent or delay type 2 diabetes is to adopt healthy habits, which include:  · Staying at a healthy weight. · Exercising regularly. · Eating healthy foods. How is type 2 diabetes treated? If you have type 2 diabetes, here are the most important things you can do. · Take your diabetes medicines. · Check your blood sugar as often as your doctor recommends. Also, get a hemoglobin A1c test at least every 6 months. · Try to eat a variety of foods and to spread carbohydrate throughout the day. Carbohydrate raises blood sugar higher and more quickly than any other nutrient does. Carbohydrate is found in sugar, breads and cereals, fruit, starchy vegetables such as potatoes and corn, and milk and yogurt. · Get at least 30 minutes of exercise on most days of the week. Walking is a good choice. You also may want to do other activities, such as running, swimming, cycling, or playing tennis or team sports. If your doctor says it's okay, do muscle-strengthening exercises at least 2 times a week. · See your doctor for checkups and tests on a regular schedule. · If you have high blood pressure or high cholesterol, take the medicines as prescribed by your doctor. · Do not smoke. Smoking can make health problems worse. This includes problems you might have with type 2 diabetes. If you need help quitting, talk to your doctor about stop-smoking programs and medicines. These can increase your chances of quitting for good. Follow-up care is a key part of your treatment and safety. Be sure to make and go to all appointments, and call your doctor if you are having problems. It's also a good idea to know your test results and keep a list of the medicines you take. Where can you learn more? Go to http://raz-gabe.info/. Enter F663 in the search box to learn more about \"Learning About Type 2 Diabetes. \"  Current as of: March 13, 2017  Content Version: 11.4  © 1240-1368 Healthwise, [employer].  Care instructions adapted under license by [employer] (which disclaims liability or warranty for this information). If you have questions about a medical condition or this instruction, always ask your healthcare professional. [address] any warranty or liability for your use of this information. Learning About Metformin for Type 2 Diabetes  Introduction    Metformin (such as Glucophage) is a medicine used to treat type 2 diabetes. It helps keep blood sugar levels on target. You may have tried to eat a healthy diet, lose weight, and get more exercise to keep your blood sugar in your target range. If those things do not help, you may take a medicine called metformin. It helps your body use insulin. This can help you control your blood sugar. You might take it on its own or with other medicines. When taken on its own, metformin should not cause low blood sugar or weight gain. Example  · Metformin (Glucophage)  Possible side effects  Common side effects include:  · Short-term nausea. · Not feeling hungry. · Diarrhea. · Increased gas in your belly. · A metallic taste. You may have side effects or reactions not listed here. Check the information that comes with your medicine. What to know about taking this medicine  · Metformin does not usually cause low blood sugar. But you may get a low blood sugar when you take metformin and you exercise hard, drink alcohol, or you do not eat enough food. · Sometimes metformin is combined with other diabetes medicine. Some of these can cause low blood sugar. · If you need a test that uses a dye or you need to have surgery, be sure to tell all of your doctors that you take metformin. You may have to stop taking it before and after the test or surgery. · Over time, blood levels of vitamin B12 can decrease in some people who take metformin. Your body needs this B vitamin to make blood cells. It also keeps your nervous system healthy.  If you have been taking metformin for more than a few years, ask your doctor if you need a B12 blood test to measure the amount of vitamin B12 in your blood. · Be safe with medicines. Take your medicines exactly as prescribed. Call your doctor if you think you are having a problem with your medicine. · Check with your doctor or pharmacist before you use any other medicines. This includes over-the-counter medicines. Make sure your doctor knows all of the medicines, vitamins, herbal products, and supplements you take. Taking some medicines together can cause problems. Where can you learn more? Go to http://raz-gabe.info/. Enter Jose Lozada in the search box to learn more about \"Learning About Metformin for Type 2 Diabetes. \"  Current as of: March 13, 2017  Content Version: 11.4  © 9458-1812 Healthwise, Incorporated. Care instructions adapted under license by [employer] (which disclaims liability or warranty for this information). If you have questions about a medical condition or this instruction, always ask your healthcare professional. [place] [address] any warranty or liability for your use of this information.

## 2017-12-11 NOTE — TELEPHONE ENCOUNTER
Received call from pharmacy stating that they received an Rx for the lancets and the blood glucose meter kit, but they have not received anything for test strips.  Please advise

## 2017-12-11 NOTE — PROGRESS NOTES
Chief Complaint   Patient presents with    Well Woman     needs pap smear and an order to get mammogram done     1. Have you been to the ER, urgent care clinic since your last visit? Hospitalized since your last visit? Yes,12/5/17 [facility] ER for migraine    2. Have you seen or consulted any other health care providers outside of the [address] [place] [address] since your last visit? Include any pap smears or colon screening. No    Visit Vitals    /89 (BP 1 Location: Left arm, BP Patient Position: Sitting)    Pulse (!) 109    Temp 98.5 °F (36.9 °C) (Oral)    Resp 16    Ht 5' 3\" (1.6 m)    Wt 158 lb 9.6 oz (71.9 kg)    SpO2 93%    BMI 28.09 kg/m2       Patient would like t-dap immnization today. Adelaide Jennings is a 62 y.o. female who presents for routine immunizations. She denies any symptoms , reactions or allergies that would exclude them from being immunized today. Risks and adverse reactions were discussed and the VIS was given to them. All questions were addressed. She was observed for 15 min post injection. There were no reactions observed.     Keena Smith LPN

## 2017-12-11 NOTE — MR AVS SNAPSHOT
Visit Information Date & Time Provider Department Dept. Phone Encounter #  
 12/11/2017  3:00 PM Charis Langston DO [address] [phone] [id number] Upcoming Health Maintenance Date Due  
 PAP AKA CERVICAL CYTOLOGY 9/17/1981 BREAST CANCER SCRN MAMMOGRAM 9/17/2010 FOBT Q 1 YEAR AGE 50-75 9/17/2010 DTaP/Tdap/Td series (2 - Td) 12/11/2027 Allergies as of 12/11/2017  Review Complete On: 12/11/2017 By: Charis Langston DO Severity Noted Reaction Type Reactions Latex Medium 08/10/2012   Systemic Itching  
 inflammation Augmentin [Amoxicillin-pot Clavulanate]  11/11/2014    Hives Current Immunizations  Never Reviewed Name Date Tdap 12/11/2017 Not reviewed this visit You Were Diagnosed With   
  
 Codes Comments Encounter for well woman exam with routine gynecological exam    -  Primary ICD-10-CM: J98.889 ICD-9-CM: V72.31 Uncontrolled type 2 diabetes mellitus with hyperglycemia, without long-term current use of insulin (HCC)     ICD-10-CM: E11.65 ICD-9-CM: 250.02 Mixed hyperlipidemia     ICD-10-CM: E78.2 ICD-9-CM: 272.2 Sarcoidosis     ICD-10-CM: D86.9 ICD-9-CM: 135 Migraine without status migrainosus, not intractable, unspecified migraine type     ICD-10-CM: G43.909 ICD-9-CM: 346.90 Gastroesophageal reflux disease without esophagitis     ICD-10-CM: K21.9 ICD-9-CM: 530.81 Encounter for immunization     ICD-10-CM: G44 ICD-9-CM: V03.89 Cancer screening     ICD-10-CM: Z12.9 ICD-9-CM: V76.9 Vitals BP Pulse Temp Resp Height(growth percentile) Weight(growth percentile) 143/89 (BP 1 Location: Left arm, BP Patient Position: Sitting) (!) 109 98.5 °F (36.9 °C) (Oral) 16 5' 3\" (1.6 m) 158 lb 9.6 oz (71.9 kg) SpO2 BMI OB Status Smoking Status 93% 28.09 kg/m2 Postmenopausal Former Smoker BMI and BSA Data Body Mass Index Body Surface Area  28.09 kg/m 2 1.79 m 2  
  
  
 Preferred Pharmacy Pharmacy Name Phone [facility] [address] [facility] [address] [facility], [address] [place] [id number] [facility] [phone] Your Updated Medication List  
  
   
This list is accurate as of: 12/11/17  4:26 PM.  Always use your most recent med list.  
  
  
  
  
 aspirin-acetaminophen-caffeine 250-250-65 mg per tablet Commonly known as:  EXCEDRIN ES Take 1 Tab by mouth every six (6) hours as needed for Pain. Blood-Glucose Meter monitoring kit Newly diagnosed DM. Check as needed. 1-2 per day. dihydroergotamine 0.5 mg/pump act. (4 mg/mL) nasal spray Commonly known as:  MIGRANAL  
1 spray by Nasal route as needed for Migraine. use in one nostril as directed. No more than 4 sprays in one hour FLONASE 50 mcg/actuation nasal spray Generic drug:  fluticasone 2 Sprays by Both Nostrils route daily. Lancets Misc Use as needed 1-2 per day. metFORMIN  mg tablet Commonly known as:  GLUCOPHAGE XR Take 1 Tab by mouth daily (with dinner). ZEGERID OTC 20-1.1 mg-gram capsule Generic drug:  omeprazole-sodium bicarbonate Take 1 tablet by mouth daily. Prescriptions Sent to Pharmacy Refills Blood-Glucose Meter monitoring kit 0 Sig: Newly diagnosed DM. Check as needed. 1-2 per day. Class: Normal  
 Pharmacy: [address], [address] [facility] [id number] [place] [address] Ph #: [phone]  
 metFORMIN ER (GLUCOPHAGE XR) 500 mg tablet 0 Sig: Take 1 Tab by mouth daily (with dinner). Class: Normal  
 Pharmacy: [address] [place] [address], [address] Ph #: [phone] Route: Oral  
 Lancets misc 11 Sig: Use as needed 1-2 per day. Class: Normal  
 Pharmacy: [address] [place] [address], [address] Ph #: [phone] We Performed the Following  DIABETES EDUCATION [5 Custom] PAP IG, APTIMA HPV AND RFX 16/18,45 ([id number]) [[id number] Custom] GA IMMUNIZ ADMIN,1 SINGLE/COMB VAC/TOXOID [id number] CPT(R)] REFERRAL TO PULMONARY DISEASE [VMX15 Custom] TETANUS, DIPHTHERIA TOXOIDS AND ACELLULAR PERTUSSIS VACCINE (TDAP), IN INDIVIDS. >=7, IM [id number] CPT(R)] To-Do List   
 2017 GI:  COLONOSCOPY   
  
 2017 Imaging:  HERMINIO MAMMOGRAM DIAG BILAT SAME DAY INCL CAD Referral Information Referral ID Referred By Referred To  
  
 [id number] Felisa ARTHUR MD   
   [facility] [address] [place], [address] [place] [address] Phone: [phone] Fax: [phone] Visits Status Start Date End Date 1 New Request 17 If your referral has a status of pending review or denied, additional information will be sent to support the outcome of this decision. Patient Instructions Vaccine Information Statement Tdap (Tetanus, Diphtheria, Pertussis) Vaccine: What You Need to Know Many Vaccine Information Statements are available in [place]olan and other languages. See www.immunize.org/vis. Hojas de Información Sobre Vacunas están disponibles en español y en muchos otros idiomas. Visite WorthScale.si 1. Why get vaccinated? Tetanus, diphtheria, and pertussis are very serious diseases. Tdap vaccine can protect us from these diseases. And, Tdap vaccine given to pregnant women can protect  babies against pertussis. TETANUS (Lockjaw) is rare in the [facility] today. It causes painful muscle tightening and stiffness, usually all over the body. ? It can lead to tightening of muscles in the head and neck so you cant open your mouth, swallow, or sometimes even breathe. Tetanus kills about 1 out of 10 people who are infected even after receiving the best medical care. DIPHTHERIA is also rare in the [facility] today. It can cause a thick coating to form in the back of the throat. ? It can lead to breathing problems, heart failure, paralysis, and death. PERTUSSIS (Whooping Cough) causes severe coughing spells, which can cause difficulty breathing, vomiting, and disturbed sleep. ? It can also lead to weight loss, incontinence, and rib fractures. Up to 2 in 100 adolescents and 5 in 100 adults with pertussis are hospitalized or have complications, which could include pneumonia or death. These diseases are caused by bacteria. Diphtheria and pertussis are spread from person to person through secretions from coughing or sneezing. Tetanus enters the body through cuts, scratches, or wounds. Before vaccines, as many as 200,000 cases of diphtheria, 200,000 cases of pertussis, and hundreds of cases of tetanus, were reported in the [place] each year. Since vaccination began, reports of cases for tetanus and diphtheria have dropped by about 99% and for pertussis by about 80%. 2. Tdap vaccine Tdap vaccine can protect adolescents and adults from tetanus, diphtheria, and pertussis. One dose of Tdap is routinely given at age 6 or 15. People who did not get Tdap at that age should get it as soon as possible. Tdap is especially important for health care professionals and anyone having close contact with a baby younger than 12 months. Pregnant women should get a dose of Tdap during every pregnancy, to protect the  from pertussis. Infants are most at risk for severe, life-threatening complications from pertussis. Another vaccine, called Td, protects against tetanus and diphtheria, but not pertussis. A Td booster should be given every 10 years. Tdap may be given as one of these boosters if you have never gotten Tdap before. Tdap may also be given after a severe cut or burn to prevent tetanus infection. Your doctor or the person giving you the vaccine can give you more information. Tdap may safely be given at the same time as other vaccines. 3. Some people should not get this vaccine  A person who has ever had a life-threatening allergic reaction after a previous dose of any diphtheria, tetanus or pertussis containing vaccine, OR has a severe allergy to any part of this vaccine, should not get Tdap vaccine. Tell the person giving the vaccine about any severe allergies.  Anyone who had coma or long repeated seizures within 7 days after a childhood dose of DTP or DTaP, or a previous dose of Tdap, should not get Tdap, unless a cause other than the vaccine was found. They can still get Td.  Talk to your doctor if you: 
- have seizures or another nervous system problem, 
- had severe pain or swelling after any vaccine containing diphtheria, tetanus or pertussis,  
- ever had a condition called Guillain Barré Syndrome (GBS), 
- arent feeling well on the day the shot is scheduled. 4. Risks With any medicine, including vaccines, there is a chance of side effects. These are usually mild and go away on their own. Serious reactions are also possible but are rare. Most people who get Tdap vaccine do not have any problems with it. Mild Problems following Tdap 
(Did not interfere with activities)  Pain where the shot was given (about 3 in 4 adolescents or 2 in 3 adults)  Redness or swelling where the shot was given (about 1 person in 5)  Mild fever of at least 100.4°F (up to about 1 in 25 adolescents or 1 in 100 adults)  Headache (about 3 or 4 people in 10)  Tiredness (about 1 person in 3 or 4)  Nausea, vomiting, diarrhea, stomach ache (up to 1 in 4 adolescents or 1 in 10 adults)  Chills,  sore joints (about 1 person in 10)  Body aches (about 1 person in 3 or 4)  Rash, swollen glands (uncommon) Moderate Problems following Tdap (Interfered with activities, but did not require medical attention)  Pain where the shot was given (up to 1 in 5 or 6)  Redness or swelling where the shot was given (up to about 1 in 16 adolescents or 1 in 12 adults)  Fever over 102°F (about 1 in 100 adolescents or 1 in 250 adults)  Headache (about 1 in 7 adolescents or 1 in 10 adults)  Nausea, vomiting, diarrhea, stomach ache (up to 1 or 3 people in 100)  Swelling of the entire arm where the shot was given (up to about 1 in 500). Severe Problems following Tdap 
(Unable to perform usual activities; required medical attention)  Swelling, severe pain, bleeding, and redness in the arm where the shot was given (rare). Problems that could happen after any vaccine:  People sometimes faint after a medical procedure, including vaccination. Sitting or lying down for about 15 minutes can help prevent fainting, and injuries caused by a fall. Tell your doctor if you feel dizzy, or have vision changes or ringing in the ears.  Some people get severe pain in the shoulder and have difficulty moving the arm where a shot was given. This happens very rarely.  Any medication can cause a severe allergic reaction. Such reactions from a vaccine are very rare, estimated at fewer than 1 in a million doses, and would happen within a few minutes to a few hours after the vaccination. As with any medicine, there is a very remote chance of a vaccine causing a serious injury or death. The safety of vaccines is always being monitored. For more information, visit: www.cdc.gov/vaccinesafety/ 
 
 
The [facility] Vaccine Injury Compensation Program (VICP) is a federal program that was created to compensate people who may have been injured by certain vaccines. Persons who believe they may have been injured by a vaccine can learn about the program and about filing a claim by calling 7-703.721.2876 or visiting the icix website at www.[facility].gov/vaccinecompensation. There is a time limit to file a claim for compensation. 7. How can I learn more?  Ask your doctor. He or she can give you the vaccine package insert or suggest other sources of information.  Call your local or state health department.  Contact the Centers for Disease Control and Prevention (CDC): 
- Call 8-872.256.2224 (1-800-CDC-INFO) or 
- Visit CDCs website at www.cdc.gov/vaccines Vaccine Information Statement Tdap Vaccine 
(2/24/2015) [address] FALL[address]ie Hernandezben Ip [id number]-20 Department of [facility] and [place] Centers for Disease Control and Prevention Office Use Only Learning About Diabetes Food Guidelines Your Care Instructions Meal planning is important to manage diabetes. It helps keep your blood sugar at a target level (which you set with your doctor). You don't have to eat special foods. You can eat what your family eats, including sweets once in a while. But you do have to pay attention to how often you eat and how much you eat of certain foods. You may want to work with a dietitian or a certified diabetes educator (CDE) to help you plan meals and snacks.  A dietitian or CDE can also help you lose weight if that is one of your goals. What should you know about eating carbs? Managing the amount of carbohydrate (carbs) you eat is an important part of healthy meals when you have diabetes. Carbohydrate is found in many foods. · Learn which foods have carbs. And learn the amounts of carbs in different foods. ¨ Bread, cereal, pasta, and rice have about 15 grams of carbs in a serving. A serving is 1 slice of bread (1 ounce), ½ cup of cooked cereal, or 1/3 cup of cooked pasta or rice. ¨ Fruits have 15 grams of carbs in a serving. A serving is 1 small fresh fruit, such as an apple or orange; ½ of a banana; ½ cup of cooked or canned fruit; ½ cup of fruit juice; 1 cup of melon or raspberries; or 2 tablespoons of dried fruit. ¨ Milk and no-sugar-added yogurt have 15 grams of carbs in a serving. A serving is 1 cup of milk or 2/3 cup of no-sugar-added yogurt. ¨ Starchy vegetables have 15 grams of carbs in a serving. A serving is ½ cup of mashed potatoes or sweet potato; 1 cup winter squash; ½ of a small baked potato; ½ cup of cooked beans; or ½ cup cooked corn or green peas. · Learn how much carbs to eat each day and at each meal. A dietitian or CDE can teach you how to keep track of the amount of carbs you eat. This is called carbohydrate counting. · If you are not sure how to count carbohydrate grams, use the Plate Method to plan meals. It is a good, quick way to make sure that you have a balanced meal. It also helps you spread carbs throughout the day. ¨ Divide your plate by types of foods. Put non-starchy vegetables on half the plate, meat or other protein food on one-quarter of the plate, and a grain or starchy vegetable in the final quarter of the plate.  To this you can add a small piece of fruit and 1 cup of milk or yogurt, depending on how many carbs you are supposed to eat at a meal. 
· Try to eat about the same amount of carbs at each meal. Do not \"save up\" your daily allowance of carbs to eat at one meal. 
· Proteins have very little or no carbs per serving. Examples of proteins are beef, chicken, turkey, fish, eggs, tofu, cheese, cottage cheese, and peanut butter. A serving size of meat is 3 ounces, which is about the size of a deck of cards. Examples of meat substitute serving sizes (equal to 1 ounce of meat) are 1/4 cup of cottage cheese, 1 egg, 1 tablespoon of peanut butter, and ½ cup of tofu. How can you eat out and still eat healthy? · Learn to estimate the serving sizes of foods that have carbohydrate. If you measure food at home, it will be easier to estimate the amount in a serving of restaurant food. · If the meal you order has too much carbohydrate (such as potatoes, corn, or baked beans), ask to have a low-carbohydrate food instead. Ask for a salad or green vegetables. · If you use insulin, check your blood sugar before and after eating out to help you plan how much to eat in the future. · If you eat more carbohydrate at a meal than you had planned, take a walk or do other exercise. This will help lower your blood sugar. What else should you know? · Limit saturated fat, such as the fat from meat and dairy products. This is a healthy choice because people who have diabetes are at higher risk of heart disease. So choose lean cuts of meat and nonfat or low-fat dairy products. Use olive or canola oil instead of butter or shortening when cooking. · Don't skip meals. Your blood sugar may drop too low if you skip meals and take insulin or certain medicines for diabetes. · Check with your doctor before you drink alcohol. Alcohol can cause your blood sugar to drop too low. Alcohol can also cause a bad reaction if you take certain diabetes medicines. Follow-up care is a key part of your treatment and safety.  Be sure to make and go to all appointments, and call your doctor if you are having problems. It's also a good idea to know your test results and keep a list of the medicines you take. Where can you learn more? Go to http://raz-gabe.info/. Enter F232 in the search box to learn more about \"Learning About Diabetes Food Guidelines. \" Current as of: March 13, 2017 Content Version: 11.4 © 3033-8324 Feedjit. Care instructions adapted under license by [employer] (which disclaims liability or warranty for this information). If you have questions about a medical condition or this instruction, always ask your healthcare professional. [place] [address] any warranty or liability for your use of this information. Learning About Type 2 Diabetes What is type 2 diabetes? Insulin is a hormone that helps your body use sugar from your food as energy. Type 2 diabetes happens when your body can't use insulin the right way. Over time, the pancreas can't make enough insulin. If you don't have enough insulin, too much sugar stays in your blood. If you are overweight, get little or no exercise, or have type 2 diabetes in your family, you are more likely to have problems with the way insulin works in your body.  Americans, Hispanics, Native Americans,  Americans, and Pacific Islanders have a higher risk for type 2 diabetes. Type 2 diabetes can be prevented or delayed with a healthy lifestyle, which includes staying at a healthy weight, making smart food choices, and getting regular exercise. What can you expect with type 2 diabetes? Cory Soares keep hearing about how important it is to keep your blood sugar within a target range. That's because over time, high blood sugar can lead to serious problems. It can: 
· Harm your eyes, nerves, and kidneys. · Damage your blood vessels, leading to heart disease and stroke.  
· Reduce blood flow and cause nerve damage to parts of your body, especially your feet. This can cause slow healing and pain when you walk. · Make your immune system weak and less able to fight infections. When people hear the word \"diabetes,\" they often think of problems like these. But daily care and treatment can help prevent or delay these problems. The goal is to keep your blood sugar in a target range. That's the best way to reduce your chance of having more problems from diabetes. What are the symptoms? Some people who have type 2 diabetes may not have any symptoms early on. Many people with the disease don't even know they have it at first. But with time, diabetes starts to cause symptoms. You experience most symptoms of type 2 diabetes when your blood sugar is either too high or too low. The most common symptoms of high blood sugar include: · Thirst. 
· Frequent urination. · Weight loss. · Blurry vision. The symptoms of low blood sugar include: · Sweating. · Shakiness. · Weakness. · Hunger. · Confusion. How can you prevent type 2 diabetes? The best way to prevent or delay type 2 diabetes is to adopt healthy habits, which include: 
· Staying at a healthy weight. · Exercising regularly. · Eating healthy foods. How is type 2 diabetes treated? If you have type 2 diabetes, here are the most important things you can do. · Take your diabetes medicines. · Check your blood sugar as often as your doctor recommends. Also, get a hemoglobin A1c test at least every 6 months. · Try to eat a variety of foods and to spread carbohydrate throughout the day. Carbohydrate raises blood sugar higher and more quickly than any other nutrient does. Carbohydrate is found in sugar, breads and cereals, fruit, starchy vegetables such as potatoes and corn, and milk and yogurt. · Get at least 30 minutes of exercise on most days of the week. Walking is a good choice.  You also may want to do other activities, such as running, swimming, cycling, or playing tennis or team sports. If your doctor says it's okay, do muscle-strengthening exercises at least 2 times a week. · See your doctor for checkups and tests on a regular schedule. · If you have high blood pressure or high cholesterol, take the medicines as prescribed by your doctor. · Do not smoke. Smoking can make health problems worse. This includes problems you might have with type 2 diabetes. If you need help quitting, talk to your doctor about stop-smoking programs and medicines. These can increase your chances of quitting for good. Follow-up care is a key part of your treatment and safety. Be sure to make and go to all appointments, and call your doctor if you are having problems. It's also a good idea to know your test results and keep a list of the medicines you take. Where can you learn more? Go to http://razCipherAppsgabe.info/. Enter X007 in the search box to learn more about \"Learning About Type 2 Diabetes. \" Current as of: March 13, 2017 Content Version: 11.4 © 7217-0740 Integrity Tracking. Care instructions adapted under license by [employer] (which disclaims liability or warranty for this information). If you have questions about a medical condition or this instruction, always ask your healthcare professional. [address] any warranty or liability for your use of this information. Learning About Metformin for Type 2 Diabetes Introduction Metformin (such as Glucophage) is a medicine used to treat type 2 diabetes. It helps keep blood sugar levels on target. You may have tried to eat a healthy diet, lose weight, and get more exercise to keep your blood sugar in your target range. If those things do not help, you may take a medicine called metformin. It helps your body use insulin. This can help you control your blood sugar. You might take it on its own or with other medicines. When taken on its own, metformin should not cause low blood sugar or weight gain. Example · Metformin (Glucophage) Possible side effects Common side effects include: · Short-term nausea. · Not feeling hungry. · Diarrhea. · Increased gas in your belly. · A metallic taste. You may have side effects or reactions not listed here. Check the information that comes with your medicine. What to know about taking this medicine · Metformin does not usually cause low blood sugar. But you may get a low blood sugar when you take metformin and you exercise hard, drink alcohol, or you do not eat enough food. · Sometimes metformin is combined with other diabetes medicine. Some of these can cause low blood sugar. · If you need a test that uses a dye or you need to have surgery, be sure to tell all of your doctors that you take metformin. You may have to stop taking it before and after the test or surgery. · Over time, blood levels of vitamin B12 can decrease in some people who take metformin. Your body needs this B vitamin to make blood cells. It also keeps your nervous system healthy. If you have been taking metformin for more than a few years, ask your doctor if you need a B12 blood test to measure the amount of vitamin B12 in your blood. · Be safe with medicines. Take your medicines exactly as prescribed. Call your doctor if you think you are having a problem with your medicine. · Check with your doctor or pharmacist before you use any other medicines. This includes over-the-counter medicines. Make sure your doctor knows all of the medicines, vitamins, herbal products, and supplements you take. Taking some medicines together can cause problems. Where can you learn more? Go to http://raz-gabe.info/. Enter Jyoti Junior in the search box to learn more about \"Learning About Metformin for Type 2 Diabetes. \" Current as of: March 13, 2017 Content Version: 11.4 © 5730-0266 Healthwise, Incorporated. Care instructions adapted under license by [employer]am[employer] (which disclaims liability or warranty for this information). If you have questions about a medical condition or this instruction, always ask your healthcare professional. [address] any warranty or liability for your use of this information. Introducing [facility]! [facility] introduces [employer] patient portal. Now you can access parts of your medical record, email your doctor's office, and request medication refills online. 1. In your internet browser, go to [email or web address] 2. Click on the First Time User? Click Here link in the Sign In box. You will see the New Member Sign Up page. 3. Enter your [employer] Access Code exactly as it appears below. You will not need to use this code after youve completed the sign-up process. If you do not sign up before the expiration date, you must request a new code. · [employer] Access Code: [email or web address] Expires: 2/22/2018 10:32 AM 
 
4. Enter the last four digits of your Social Security Number (xxxx) and Date of Birth (mm/dd/yyyy) as indicated and click Submit. You will be taken to the next sign-up page. 5. Create a [employer] ID. This will be your [employer] login ID and cannot be changed, so think of one that is secure and easy to remember. 6. Create a [employer] password. You can change your password at any time. 7. Enter your Password Reset Question and Answer. This can be used at a later time if you forget your password. 8. Enter your e-mail address. You will receive e-mail notification when new information is available in [address] 9. Click Sign Up. You can now view and download portions of your medical record. 10. Click the Download Summary menu link to download a portable copy of your medical information.  
 
If you have questions, please visit the Frequently Asked Questions section of the [employer]. Remember, [employer] is NOT to be used for urgent needs. For medical emergencies, dial 911. Now available from your iPhone and Android! Please provide this summary of care documentation to your next provider. Your primary care clinician is listed as Roberto English. If you have any questions after today's visit, please call [phone].

## 2017-12-11 NOTE — PROGRESS NOTES
[address] [facility] [address] [place] [address]   Office [phone], Fax [phone]      Subjective:     CC:Well woman exam  History provided by patient    HPI:  Xiomara Sorenson is a 62 y.o. WHITE OR  female with significant PMHx of Sarcoidosis, new onset DM, GERD, HLD presenting for well woman exam.     Sarcoidosis - over a decade. - Have not seen a pulmonologist in over a decade. Said her male provider was creepy and made her not go to any doctors. - ROS: no cough    New onset DM   - A1c 12.7. Initially opposed to meds as friend had not tolerated it well. Also mentioning about a study that she read that meat would cause diabetes. - ROS: lost weight initally. HLD   - Not on cholestrol medication due to side effects (3 meds that she cant remember.   - ASCVD 7.7%    Migraines. Stable. - On excedrin prn. GERD. Stable. - Omeprazole     HTN - checked at home (8x in the last 2wks). 113s SBP    Health maintenance and cancer screening:  - Colonoscopy: have not had any. Would like to have one.   - Mammogram: has been more than 3 yrs. Would like to a repeat. - Cervical: >5yrs ago. Today  - Vision: 5 yrs ago, overdue, says its her on list  - Dental: does not want to go. \"gets sick when she goes\"    OBhx:   - [id number]    GynHx:  - In menopause (11yrs ago)  - Sexually active: yes. Allergic latex.     Immunization:   - Does not want to flu shot  - Refused pneumonia shot    Medication reviewed  Allergy reviewed.     SocHx  - Smoking: quit 30yrs ago  - Alcohol: 3-4 a year  - Drugs: no  - Occupation: Artist     ROS (bolded are positive):   General Negative for fever, chills, changes in weight, changes in appetite   HEENT Negative for hearing loss, earache, congestion, sore throat   CV Negative for chest pain, palpitations, edema   Respiratory Negative for cough, shortness of breath, wheezing   GI Negative for change in bowel habits (sometimes diarrhea - might be lactose), abdominal pain, black or bloody stools, nausea or vomiting    Negative for frequency, dysuria, hematuria, vaginal discharge   MSK Negative for back pain, joint pain, muscle pain   Breast Negative for breast lumps, nipple discharge, galactorrhea   Skin Negative for itching, rash, hives   Neuro Negative for dizziness, headache, confusion, weakness   Psych Negative for anxiety, depression, change in mood   Heme/lymph Negative for bleeding, bruising, pallor     Past Medical History:   Diagnosis Date    Diabetes ([facility] [address]     Neurological disorder     migraines    Sarcoidosis     Stickler's syndrome     daughter has-pt may have (genetic connective tissue disorder)       Current Outpatient Prescriptions on File Prior to Visit   Medication Sig Dispense Refill    fluticasone (FLONASE) 50 mcg/actuation nasal spray 2 Sprays by Both Nostrils route daily.  aspirin-acetaminophen-caffeine (EXCEDRIN ES) 250-250-65 mg per tablet Take 1 Tab by mouth every six (6) hours as needed for Pain. 30 Tab 1    omeprazole-sodium bicarbonate (ZEGERID OTC) 20-1.1 mg-gram cap Take 1 tablet by mouth daily.  dihydroergotamine (MIGRANAL) 0.5 mg/pump act. (4 mg/mL) nasal spray 1 spray by Nasal route as needed for Migraine. use in one nostril as directed. No more than 4 sprays in one hour       No current facility-administered medications on file prior to visit. Allergies   Allergen Reactions    Latex Itching     inflammation    Augmentin [Amoxicillin-Pot Clavulanate] Hives       Past Surgical History:   Procedure Laterality Date    HX ORTHOPAEDIC Right     wrist       Social History     Social History    Marital status:      Spouse name: N/A    Number of children: N/A    Years of education: N/A     Occupational History    Not on file.      Social History Main Topics    Smoking status: Former Smoker    Smokeless tobacco: Never Used    Alcohol use No    Drug use: No    Sexual activity: Yes     Partners: Male     Other Topics Concern    Not on file     Social History Narrative       Patient Active Problem List   Diagnosis Code    Distal radius fracture S52.509A         Objective:   Vitals - reviewed  Visit Vitals    /89 (BP 1 Location: Left arm, BP Patient Position: Sitting)    Pulse (!) 109    Temp 98.5 °F (36.9 °C) (Oral)    Resp 16    Ht 5' 3\" (1.6 m)    Wt 158 lb 9.6 oz (71.9 kg)    SpO2 93%    BMI 28.09 kg/m2        Physical Exam: Done by Dr Kaleigh Varner (refer to her note)    Pertinent Labs/Studies: A1c 12. 7. HLD elevated. Refer to labs. Assessment and orders:       ICD-10-CM ICD-9-CM    1. Encounter for well woman exam with routine gynecological exam Z01.419 V72.31 PAP IG, APTIMA HPV AND RFX 16/18,45 ([id number])   2. Uncontrolled type 2 diabetes mellitus with hyperglycemia, without long-term current use of insulin (HCC) E11.65 250.02  DIABETES EDUCATION      Blood-Glucose Meter monitoring kit      metFORMIN ER (GLUCOPHAGE XR) 500 mg tablet      Lancets misc   3. Mixed hyperlipidemia E78.2 272.2    4. Sarcoidosis D86.9 135 REFERRAL TO PULMONARY DISEASE   5. Migraine without status migrainosus, not intractable, unspecified migraine type G43.909 346.90    6. Gastroesophageal reflux disease without esophagitis K21.9 530.81    7. Encounter for immunization Z23 V03.89 TETANUS, DIPHTHERIA TOXOIDS AND ACELLULAR PERTUSSIS VACCINE (TDAP), IN INDIVIDS. >=7, IM      MO IMMUNIZ ADMIN,1 SINGLE/COMB VAC/TOXOID   8. Cancer screening Z12.9 V76.9 COLONOSCOPY      HERMINIO MAMMOGRAM DIAG BILAT SAME DAY INCL CAD      REFERRAL TO GASTROENTEROLOGY      CANCELED:  PAP SMEAR     Diagnoses and all orders for this visit:    1. Encounter for well woman exam with routine gynecological exam. Done by Dr Kaleigh Varner. Refer to her note. -     PAP IG, APTIMA HPV AND RFX 16/18,45 ([id number])    2.  Uncontrolled type 2 diabetes mellitus with hyperglycemia, without long-term current use of insulin (HCC)  -      DIABETES EDUCATION  -     Blood-Glucose Meter monitoring kit; Newly diagnosed DM. Check as needed. 1-2 per day. -     metFORMIN ER (GLUCOPHAGE XR) 500 mg tablet; Take 1 Tab by mouth daily (with dinner). -     Lancets misc; Use as needed 1-2 per day. 3. Mixed hyperlipidemia. Not interested in statins (past ADR for 3 meds)    4. Sarcoidosis. Not been seen for over a decade. Would like to see female provider.  -     REFERRAL TO PULMONARY DISEASE    5. Migraine without status migrainosus, not intractable, unspecified migraine type. Stable. 6. Gastroesophageal reflux disease without esophagitis. Stable. 7. Encounter for immunization. Does not want flu shot or pneumonia shot. -     Tetanus, diphtheria toxoids and acellular pertussis (TDAP) vaccine, in individuals >=7 years, IM  -     [place] IMMUNIZ ADMIN,1 SINGLE/COMB VAC/TOXOID    8. Cancer screening  -     HERMINIO MAMMOGRAM DIAG DIGITAL BILAT SAME DAY; Future  -     REFERRAL TO GASTROENTEROLOGY      Follow-up Disposition: Not on File    Pt was discussed and seen by Dr Don Victor (attending physician). I have reviewed patient medical and social history and medications. I have reviewed pertinent labs results and other data. I have discussed the diagnosis with the patient and the intended plan as seen in the above orders. The patient has received an after-visit summary and questions were answered concerning future plans. I have discussed medication side effects and warnings with the patient as well.     Caden Li MD  Resident Maria Fernanda Bunch [facility]  12/11/17

## 2017-12-11 NOTE — PROGRESS NOTES
HPI:  Dinesh Bloom is a 62 y.o. female presenting for well woman exam and follow up of recent lab work. Elevated BP: pt checks it regularly at home and it [id number] she says. Of note on review of BP's in our system they have consistently been high since 2014. She denies a diagnosis of HTN and states she has always gotten very anxious when coming to the doctor's office, and explains that outside of the office they are always normal.      Recently seen me to establish care. Did labs at that time and patient was to return to go over them and for WWE. Labs revealed new diagnosis of DM with A1C >12. Pt has been having HA's, fatigue, weight loss for some time. Since she was notified of the diagnosis she has been trying to eat less carbs. She also read a study which linked red meat consumption to an increased risk of DM so she is worried about eating red meat. DIABETIC CARE CHECKLIST   1. Patient on ASA - no  2. Patient on Statin- Refuses 2/2 muscle pain on 3 different medications   3. Patient on ACE- no  4. Diet- trying to eat salads since learning she has DM. 5. Exercise - her sarcoid makes her unable to exert herself much she says   6. Blood pressure today - elevated as below  7. Last eye check - due, will go   8. Last cholesterol check - recent  9 . Last HbA1c - recent, >12  10. Last urine microalbulmin- not done  11. Last comprehensive foot exam -  today  12. Did patient receive pneumococcal vaccine -declines  13. Does the patient have a nephrologist- no  14. Does the patient have a cardiologist-no  15. Seasonal influenza vaccine - declines     See PGY1 note, Dr. New French for other subjective patient provided during encounter. Allergies- reviewed:    Allergies   Allergen Reactions    Latex Itching     inflammation    Augmentin [Amoxicillin-Pot Clavulanate] Hives         Medications- reviewed:   Current Outpatient Prescriptions   Medication Sig    Blood-Glucose Meter monitoring kit Newly diagnosed DM. Check as needed. 1-2 per day.  metFORMIN ER (GLUCOPHAGE XR) 500 mg tablet Take 1 Tab by mouth daily (with dinner).  Lancets misc Use as needed 1-2 per day.  glucose blood VI test strips (ASCENSIA AUTODISC VI, ONE TOUCH ULTRA TEST VI) strip Use as needed 1-2/day    fluticasone (FLONASE) 50 mcg/actuation nasal spray 2 Sprays by Both Nostrils route daily.  aspirin-acetaminophen-caffeine (EXCEDRIN ES) 250-250-65 mg per tablet Take 1 Tab by mouth every six (6) hours as needed for Pain.  omeprazole-sodium bicarbonate (ZEGERID OTC) 20-1.1 mg-gram cap Take 1 tablet by mouth daily.  dihydroergotamine (MIGRANAL) 0.5 mg/pump act. (4 mg/mL) nasal spray 1 spray by Nasal route as needed for Migraine. use in one nostril as directed. No more than 4 sprays in one hour     No current facility-administered medications for this visit. Past Medical History- reviewed:  Past Medical History:   Diagnosis Date    Diabetes ([facility] [address]     Neurological disorder     migraines    Sarcoidosis     Stickler's syndrome     daughter has-pt may have (genetic connective tissue disorder)         Past Surgical History- reviewed:   Past Surgical History:   Procedure Laterality Date    HX ORTHOPAEDIC Right     wrist         Family History - reviewed:  History reviewed. No pertinent family history. Social History - reviewed:  Social History     Social History    Marital status:      Spouse name: N/A    Number of children: N/A    Years of education: N/A     Occupational History    Not on file.      Social History Main Topics    Smoking status: Former Smoker    Smokeless tobacco: Never Used    Alcohol use No    Drug use: No    Sexual activity: Yes     Partners: Male     Other Topics Concern    Not on file     Social History Narrative         Immunizations - reviewed:   Immunization History   Administered Date(s) Administered    Tdap 12/11/2017     See Dr. Jalen Duffy     Physical Exam  Visit Vitals    /89 (BP 1 Location: Left arm, BP Patient Position: Sitting)    Pulse (!) 109    Temp 98.5 °F (36.9 °C) (Oral)    Resp 16    Ht 5' 3\" (1.6 m)    Wt 158 lb 9.6 oz (71.9 kg)    SpO2 93%    BMI 28.09 kg/m2       General appearance - alert, well appearing, and in no distress  Eyes - sclera anicteric  Ears - bilateral TM's and external ear canals normal  Nose - normal and patent, no erythema, discharge or polyps  Mouth - mucous membranes moist, pharynx normal without lesions  Chest - clear to auscultation, no wheezes, rales or rhonchi, symmetric air entry  Heart - normal rate, regular rhythm, normal S1, S2, no murmurs, rubs, clicks or gallops  Abdomen - soft, nontender, nondistended, no masses or organomegaly  Neurological - alert, oriented, normal speech, no focal findings or movement disorder noted  Extremities - no pedal edema noted, no clubbing or cyanosis. Normal monofilament exam B/L. Pelvic - Exam chaperoned by Wayne Kuhn LPN. External genitalia normal without rashes or lesions. Pink and moist vaginal mucosa. Bladder prolapse noticed to level of introitus. Cervix without lesions or abnormal discharge. Uterus non tender and normal size. No adnexal masses or tenderness. Breast - deferred      Assessment/Plan:    ICD-10-CM ICD-9-CM    1. Encounter for well woman exam with routine gynecological exam Z01.419 V72.31 PAP IG, APTIMA HPV AND RFX 16/18,45 ([id number])   2. Uncontrolled type 2 diabetes mellitus with hyperglycemia, without long-term current use of insulin (HCC) E11.65 250.02  DIABETES EDUCATION      Blood-Glucose Meter monitoring kit      metFORMIN ER (GLUCOPHAGE XR) 500 mg tablet      Lancets misc      glucose blood VI test strips (ASCENSIA AUTODISC VI, ONE TOUCH ULTRA TEST VI) strip   3. Mixed hyperlipidemia E78.2 272.2    4. Sarcoidosis D86.9 135 REFERRAL TO PULMONARY DISEASE   5.  Migraine without status migrainosus, not intractable, unspecified migraine type G43.909 346.90 6. Gastroesophageal reflux disease without esophagitis K21.9 530.81    7. Encounter for immunization Z23 V03.89 TETANUS, DIPHTHERIA TOXOIDS AND ACELLULAR PERTUSSIS VACCINE (TDAP), IN INDIVIDS. >=7, IM      [place] IMMUNIZ ADMIN,1 SINGLE/COMB VAC/TOXOID   8. Cancer screening Z12.9 V76.9 COLONOSCOPY      HERMINIO MAMMOGRAM DIAG BILAT SAME DAY INCL CAD      REFERRAL TO GASTROENTEROLOGY      CANCELED: HM PAP SMEAR   9. Elevated BP without diagnosis of hypertension R03.0 796.2    10. Female bladder prolapse N81.10 618.01      · Well woman concerns addressed today. Mammogram ordered. Colonoscopy referral placed. Tdap given, refuses flu vaccine. Pap done. · Mammogram ordered today. Breast exam deferred. The American Cancer Society recommends not performing clinical breast examination, given the potential for false-positive findings and lack of evidence for improved outcomes. The USPSTF states that evidence is insufficient to assess additional benefits of clinical breast examination beyond mammography. · Pt is asymptomatic from bladder prolapse. Can refer to urology prn if it starts to bother her. · Spent a significant portion of today's visit talking about her new dx of type 2 DM. She is very resistant to medications, but after further discussion agrees to a trial of low dose metformin XR. I explained that normally with an A1C of 12, I recommend insulin initiation. She wants time to meet with diabetic education first.  She spent time telling me about how she has a friend who had lots of side effects from diabetic medications and stopped them and is doing great. I believe despite extensive counseling that she is in denial of this diagnosis. I specifically informed her of her risk of complications including MI, CVA, renal disease requiring HD, blindness, neuropathy if she does not get her diabetes under control which I suspect will require multiple oral medications, but likely insulin. · She refuses a statin.   She has apparently failed 3 2/2 myalgias in the past.  She is unwilling to try a lower dose or different one. · She refuses pneumococcal or flu vaccines. · She refuses treatment of her blood pressure. I explained that almost every blood pressure recorded since 2014 is elevated. She is certain that it is \"white coat\" HTN as she says her BPs when she checks at home are normal.  I told her that I was obligated to recommend a BP agent to treat the numbers I'm seeing but she refuses. We discussed if we did ever initiate treatment then a ACE would be the best bet. · She agrees to start ASA  · She agrees to go get her eye exam done. · Her monofilament today is normal.  · She needs microalbumin at her next visit (although I anticipate she will continue to refuse treatment with ACE-I even if indicated)    · I have referred her for diabetic education and prescribed a glucose monitor. I recommended she follow up with me 1 month after she see dsiabetic education. · She has a hx of sarcoidosis. She says this is the reason she cannot exercise. We have given her a referral to pulm for follow up. Follow-up Disposition: Not on File      I have discussed the diagnosis with the patient and the intended plan as seen in the above orders. The patient has received an after-visit summary and questions were answered concerning future plans. I have discussed medication side effects and warnings with the patient as well. Informed pt to return to the office if new symptoms arise.       Farooq Anders, DO

## 2017-12-12 ENCOUNTER — TELEPHONE (OUTPATIENT)
Dept: FAMILY MEDICINE CLINIC | Age: 57
End: 2017-12-12

## 2017-12-12 NOTE — TELEPHONE ENCOUNTER
----- Message from Angel Ruiz sent at 12/12/2017  8:09 AM EST -----  Regarding: DR. Anders/telephone  Patient requesting an appointment for a mammogram.      Mariam Pelletier contact .

## 2017-12-12 NOTE — TELEPHONE ENCOUNTER
Returned call and spoke with patient. Was informed coordination of care would call her to schedule the mammo but phone number there was given to her.

## 2017-12-14 LAB
CYTOLOGIST CVX/VAG CYTO: NORMAL
CYTOLOGY CVX/VAG DOC THIN PREP: NORMAL
DX ICD CODE: NORMAL
HPV I/H RISK 4 DNA CVX QL PROBE+SIG AMP: NEGATIVE
Lab: NORMAL
OTHER STN SPEC: NORMAL
PATH REPORT.FINAL DX SPEC: NORMAL
STAT OF ADQ CVX/VAG CYTO-IMP: NORMAL

## 2017-12-18 ENCOUNTER — TELEPHONE (OUTPATIENT)
Dept: FAMILY MEDICINE CLINIC | Age: 57
End: 2017-12-18

## 2017-12-18 DIAGNOSIS — E11.65 UNCONTROLLED TYPE 2 DIABETES MELLITUS WITH HYPERGLYCEMIA, WITHOUT LONG-TERM CURRENT USE OF INSULIN (HCC): ICD-10-CM

## 2017-12-18 RX ORDER — METFORMIN HYDROCHLORIDE 500 MG/1
500 TABLET, EXTENDED RELEASE ORAL 2 TIMES DAILY WITH MEALS
Qty: 60 TAB | Refills: 6 | Status: SHIPPED | OUTPATIENT
Start: 2017-12-18 | End: 2018-04-20 | Stop reason: SDUPTHER

## 2017-12-18 NOTE — TELEPHONE ENCOUNTER
Spoke with patient and she was asking about medication dosage. Clarified with Dr. Aj Ortez she can take metformin twice a day now. Refill sent in to pharmacy.

## 2017-12-18 NOTE — TELEPHONE ENCOUNTER
Patient requesting to speak with Dr. Zonia Cali nurse regarding Metformin. Call transferred to nurse Per Lowe.

## 2018-01-09 ENCOUNTER — HOSPITAL ENCOUNTER (OUTPATIENT)
Dept: MAMMOGRAPHY | Age: 58
Discharge: HOME | End: 2018-01-09
Attending: STUDENT IN AN ORGANIZED HEALTH CARE EDUCATION/TRAINING PROGRAM
Payer: COMMERCIAL

## 2018-01-09 DIAGNOSIS — Z12.9 CANCER SCREENING: ICD-10-CM

## 2018-01-09 PROCEDURE — 77067 SCR MAMMO BI INCL CAD: CPT

## 2018-01-11 ENCOUNTER — TELEPHONE (OUTPATIENT)
Dept: FAMILY MEDICINE CLINIC | Age: 58
End: 2018-01-11

## 2018-01-11 DIAGNOSIS — E11.8 TYPE 2 DIABETES MELLITUS WITH COMPLICATION, UNSPECIFIED LONG TERM INSULIN USE STATUS: Primary | ICD-10-CM

## 2018-01-11 NOTE — TELEPHONE ENCOUNTER
Called and informed patient order was put in she has the class tomorrow 1/12/18     [address] [facility]   [address] [facility] [address]     Per Nurse [facility] Referral Order is now on file

## 2018-01-11 NOTE — TELEPHONE ENCOUNTER
Patient calling stating insurance is telling her she needs the Dr to Authorize that she can take her diabetes class tomorrow, Per Nurse Ciera Banks she is going to ask Dr Wenceslao Shelley and call patient back, thank you.

## 2018-01-12 ENCOUNTER — HOSPITAL ENCOUNTER (OUTPATIENT)
Dept: DIABETES SERVICES | Age: 58
Discharge: HOME | End: 2018-01-12
Payer: COMMERCIAL

## 2018-01-12 PROCEDURE — G0109 DIAB MANAGE TRN IND/GROUP: HCPCS

## 2018-01-26 ENCOUNTER — HOSPITAL ENCOUNTER (OUTPATIENT)
Dept: DIABETES SERVICES | Age: 58
Discharge: HOME | End: 2018-01-26
Payer: COMMERCIAL

## 2018-01-26 DIAGNOSIS — E11.9 NEWLY DIAGNOSED DIABETES (HCC): ICD-10-CM

## 2018-01-26 PROCEDURE — G0109 DIAB MANAGE TRN IND/GROUP: HCPCS

## 2018-02-06 ENCOUNTER — TELEPHONE (OUTPATIENT)
Dept: FAMILY MEDICINE CLINIC | Age: 58
End: 2018-02-06

## 2018-02-06 NOTE — TELEPHONE ENCOUNTER
----- Message from Leighton Perez sent at 2/6/2018  3:08 PM EST -----  Regarding: Dr. Jennifer Rubalcava  Pt is requesting a call back regarding clarification on f/u appt for diabetic education. Best Contact [phone].

## 2018-03-05 ENCOUNTER — HOSPITAL ENCOUNTER (OUTPATIENT)
Dept: DIABETES SERVICES | Age: 58
Discharge: HOME | End: 2018-03-05
Payer: COMMERCIAL

## 2018-03-05 DIAGNOSIS — E11.9 NEWLY DIAGNOSED DIABETES (HCC): ICD-10-CM

## 2018-03-05 PROCEDURE — G0109 DIAB MANAGE TRN IND/GROUP: HCPCS

## 2018-03-05 NOTE — DIABETES MGMT
03/05/18      Thank you for your kind referral. Your patient,  Vera Cobos has completed his/her personal initial comprehensive education plan. The education plan included the following topics: Disease Process, Nutrition, exercise, Blood Glucose Monitoring, Mediation, Acute and Chronic Complications, Behavioral and Lifestyle Change and Healthy Coping. Data at this visit:     Weight: 1/12/2018 157.8 #;     3/5/2018 151 #    HgbA1c: 11/27/2017 12.7 % , 3/5/2018 6.0 %     Increased risk for diabetes: 5.7-6.4 %,  Diabetes: >6.4%    Glycemic control for adults with diabetes: <7%   Elderly or multiple medical conditions: <8%         Your patient continued the following goal(s) from their first class: Goal 1: Exercise more often - 20 min 4d/wk         Education Learning Outcomes for All Education Topics(see above): Demonstrated Competency             If you have any questions, please do not hesitate to call the [facility] at [phone]    Sincerely,    Perfecto aSntizo, [address] ,[address], [facility]  Phone: [phone] Fax [phone]

## 2018-04-20 ENCOUNTER — OFFICE VISIT (OUTPATIENT)
Dept: FAMILY MEDICINE CLINIC | Age: 58
End: 2018-04-20

## 2018-04-20 VITALS
BODY MASS INDEX: 26.58 KG/M2 | RESPIRATION RATE: 16 BRPM | TEMPERATURE: 98.5 F | DIASTOLIC BLOOD PRESSURE: 79 MMHG | SYSTOLIC BLOOD PRESSURE: 118 MMHG | HEIGHT: 63 IN | OXYGEN SATURATION: 100 % | HEART RATE: 104 BPM | WEIGHT: 150 LBS

## 2018-04-20 RX ORDER — OMEPRAZOLE 20 MG/1
20 CAPSULE, DELAYED RELEASE ORAL DAILY
COMMUNITY

## 2018-04-20 RX ORDER — METFORMIN HYDROCHLORIDE 500 MG/1
500 TABLET, EXTENDED RELEASE ORAL 2 TIMES DAILY WITH MEALS
Qty: 60 TAB | Refills: 6 | Status: SHIPPED | OUTPATIENT
Start: 2018-04-20 | End: 2018-04-20 | Stop reason: SDUPTHER

## 2018-04-20 RX ORDER — METFORMIN HYDROCHLORIDE 500 MG/1
500 TABLET, EXTENDED RELEASE ORAL 2 TIMES DAILY WITH MEALS
Qty: 180 TAB | Refills: 3 | Status: SHIPPED | OUTPATIENT
Start: 2018-04-20 | End: 2019-03-28 | Stop reason: SDUPTHER

## 2018-04-20 RX ORDER — MELOXICAM 15 MG/1
15 TABLET ORAL DAILY
COMMUNITY
End: 2018-09-16

## 2018-04-20 NOTE — PATIENT INSTRUCTIONS
Trigger Finger: Care Instructions  Your Care Instructions  A trigger finger is a finger stuck in a bent position. The bent finger usually straightens out on its own. A trigger finger can be painful, but it normally is not a serious problem. Trigger fingers seem to occur more in some groups of people. These include people who have diabetes or arthritis or who have injured their hands in the past. This problem also occurs in musicians and people who  tools often. Rest, exercises, and other things you can do at home may help your trigger finger relax so that it can bend as it should. You may get a corticosteroid shot. This can reduce swelling and pain. Your doctor may put a splint on your finger. This will give your finger some rest and avoid irritating the joint. You may need surgery if the finger keeps locking in a bent position. Follow-up care is a key part of your treatment and safety. Be sure to make and go to all appointments, and call your doctor if you are having problems. It's also a good idea to know your test results and keep a list of the medicines you take. How can you care for yourself at home? · If your doctor put a splint on your finger, wear the splint as directed. Do not remove it until your doctor says you can. · You may need to change your activities to avoid movements that irritate the finger. · If your finger is swollen, put ice or a cold pack on your finger for 10 to 20 minutes at a time. Try to do this every 1 to 2 hours for the next 3 days (when you are awake) or until the swelling goes down. Put a thin cloth between the ice and your skin. · Prop up your hand on a pillow when you ice it or anytime you sit or lie down during the next 3 days. Try to keep it above the level of your heart. This will help reduce swelling. · Take your medicines exactly as prescribed. Call your doctor if you think you are having a problem with your medicine.   · Ask your doctor if you can take an over-the-counter pain medicine, such as acetaminophen (Tylenol), ibuprofen (Advil, Motrin), or naproxen (Aleve). Be safe with medicines. Read and follow all instructions on the label. · If your doctor recommends exercises, do them as directed. When should you call for help? Call your doctor now or seek immediate medical care if:  ? · Your finger locks in a bent position and will not straighten. ? Watch closely for changes in your health, and be sure to contact your doctor if:  ? · You do not get better as expected. Where can you learn more? Go to http://raz-gabe.info/. Enter M826 in the search box to learn more about \"Trigger Finger: Care Instructions. \"  Current as of: March 21, 2017  Content Version: 11.4  © 8358-0144 [employer]. Care instructions adapted under license by [employer] (which disclaims liability or warranty for this information). If you have questions about a medical condition or this instruction, always ask your healthcare professional. [address] any warranty or liability for your use of this information.

## 2018-04-20 NOTE — MR AVS SNAPSHOT
[address] 
[phone] Patient: Jcarlos Prado MRN: [id number] Visit Information Date & Time Provider Department Dept. Phone Encounter #  
 4/20/2018  2:30 PM Lito Waldrop DO [address] [phone] [id number] Upcoming Health Maintenance Date Due  
 FOOT EXAM Q1 9/17/1970 MICROALBUMIN Q1 9/17/1970 EYE EXAM RETINAL OR DILATED Q1 9/17/1970 Pneumococcal 19-64 Medium Risk (1 of 1 - PPSV23) 9/17/1979 FOBT Q 1 YEAR AGE 50-75 9/17/2010 HEMOGLOBIN A1C Q6M 5/24/2018 LIPID PANEL Q1 11/24/2018 BREAST CANCER SCRN MAMMOGRAM 1/9/2020 PAP AKA CERVICAL CYTOLOGY 12/11/2020 DTaP/Tdap/Td series (2 - Td) 12/11/2027 Allergies as of 4/20/2018  Review Complete On: 4/20/2018 By: Lito Waldrop DO Severity Noted Reaction Type Reactions Latex Medium 08/10/2012   Systemic Itching  
 inflammation Augmentin [Amoxicillin-pot Clavulanate]  11/11/2014    Hives Current Immunizations  Never Reviewed Name Date Tdap 12/11/2017 Not reviewed this visit You Were Diagnosed With   
  
 Codes Comments Uncontrolled type 2 diabetes mellitus without complication, without long-term current use of insulin ([facility]     ICD-10-CM: E11.65 ICD-9-CM: 250.02 Vitals BP Pulse Temp Resp Height(growth percentile) Weight(growth percentile) 118/79 (BP 1 Location: Left arm, BP Patient Position: Sitting) (!) 104 98.5 °F (36.9 °C) (Oral) 16 5' 3\" (1.6 m) 150 lb (68 kg) SpO2 BMI OB Status Smoking Status 100% 26.57 kg/m2 Postmenopausal Former Smoker Vitals History BMI and BSA Data Body Mass Index Body Surface Area  
 26.57 kg/m 2 1.74 m 2 Preferred Pharmacy Pharmacy Name Phone FOOD [address] [facility], [address] [phone] Your Updated Medication List  
  
   
 This list is accurate as of 4/20/18  2:40 PM.  Always use your most recent med list.  
  
  
  
  
 aspirin-acetaminophen-caffeine 250-250-65 mg per tablet Commonly known as:  EXCEDRIN ES Take 1 Tab by mouth every six (6) hours as needed for Pain. Blood-Glucose Meter monitoring kit Newly diagnosed DM. Check as needed. 1-2 per day. dihydroergotamine 0.5 mg/pump act. (4 mg/mL) nasal spray Commonly known as:  MIGRANAL  
1 spray by Nasal route as needed for Migraine. use in one nostril as directed. No more than 4 sprays in one hour FLONASE 50 mcg/actuation nasal spray Generic drug:  fluticasone 2 Sprays by Both Nostrils route daily. glucose blood VI test strips strip Commonly known as:  ASCENSIA AUTODISC VI, ONE TOUCH ULTRA TEST VI  
Use as needed 1-2/day Lancets Misc Use as needed 1-2 per day. meloxicam 15 mg tablet Commonly known as:  MOBIC Take 15 mg by mouth daily. metFORMIN  mg tablet Commonly known as:  GLUCOPHAGE XR Take 1 Tab by mouth two (2) times daily (with meals). omeprazole 20 mg capsule Commonly known as:  PRILOSEC Take 20 mg by mouth daily. ZEGERID OTC 20-1.1 mg-gram capsule Generic drug:  omeprazole-sodium bicarbonate Take 1 tablet by mouth daily. Prescriptions Sent to Pharmacy Refills  
 metFORMIN ER (GLUCOPHAGE XR) 500 mg tablet 6 Sig: Take 1 Tab by mouth two (2) times daily (with meals). Class: Normal  
 Pharmacy: [address] [place] [address] Ph #: [phone] Route: Oral  
  
We Performed the Following HEMOGLOBIN A1C WITH EAG [17421 CPT(R)] LIPID PANEL [07540 CPT(R)] METABOLIC PANEL, COMPREHENSIVE [41114 CPT(R)] MICROALBUMIN, UR, RAND W/ MICROALB/CREAT RATIO [id number] CPT(R)] To-Do List   
 09/13/2018 9:00 AM  
  Appointment with [facility] DIABETES CLASS #4 at [address] [place] [address],Suite [facility] ([phone]) Patient Instructions Trigger Finger: Care Instructions Your Care Instructions A trigger finger is a finger stuck in a bent position. The bent finger usually straightens out on its own. A trigger finger can be painful, but it normally is not a serious problem. Trigger fingers seem to occur more in some groups of people. These include people who have diabetes or arthritis or who have injured their hands in the past. This problem also occurs in musicians and people who  tools often. Rest, exercises, and other things you can do at home may help your trigger finger relax so that it can bend as it should. You may get a corticosteroid shot. This can reduce swelling and pain. Your doctor may put a splint on your finger. This will give your finger some rest and avoid irritating the joint. You may need surgery if the finger keeps locking in a bent position. Follow-up care is a key part of your treatment and safety. Be sure to make and go to all appointments, and call your doctor if you are having problems. It's also a good idea to know your test results and keep a list of the medicines you take. How can you care for yourself at home? · If your doctor put a splint on your finger, wear the splint as directed. Do not remove it until your doctor says you can. · You may need to change your activities to avoid movements that irritate the finger. · If your finger is swollen, put ice or a cold pack on your finger for 10 to 20 minutes at a time. Try to do this every 1 to 2 hours for the next 3 days (when you are awake) or until the swelling goes down. Put a thin cloth between the ice and your skin. · Prop up your hand on a pillow when you ice it or anytime you sit or lie down during the next 3 days. Try to keep it above the level of your heart. This will help reduce swelling. · Take your medicines exactly as prescribed. Call your doctor if you think you are having a problem with your medicine. · Ask your doctor if you can take an over-the-counter pain medicine, such as acetaminophen (Tylenol), ibuprofen (Advil, Motrin), or naproxen (Aleve). Be safe with medicines. Read and follow all instructions on the label. · If your doctor recommends exercises, do them as directed. When should you call for help? Call your doctor now or seek immediate medical care if: 
? · Your finger locks in a bent position and will not straighten. ? Watch closely for changes in your health, and be sure to contact your doctor if: 
? · You do not get better as expected. Where can you learn more? Go to http://raz-gabe.info/. Enter M826 in the search box to learn more about \"Trigger Finger: Care Instructions. \" Current as of: March 21, 2017 Content Version: 11.4 © 0534-9207 [employer]. Care instructions adapted under license by [employer] (which disclaims liability or warranty for this information). If you have questions about a medical condition or this instruction, always ask your healthcare professional. [place] [address] any warranty or liability for your use of this information. Introducing [address]! Dear Melissa Sos: 
Thank you for requesting a [employer] account. Our records indicate that you already have an active [employer] account. You can access your account anytime at [email or web address]. [email or web address] Did you know that you can access your hospital and ER discharge instructions at any time in [employer]? You can also review all of your test results from your hospital stay or ER visit. Additional Information If you have questions, please visit the Frequently Asked Questions section of the [employer] website at [email or web address] Remember, [employer] is NOT to be used for urgent needs. For medical emergencies, dial 911. Now available from your iPhone and Android! Please provide this summary of care documentation to your next provider. Your primary care clinician is listed as Malay Speaker. If you have any questions after today's visit, please call [phone].

## 2018-04-20 NOTE — PROGRESS NOTES
HPI:  Padmaja Metcalf is a 62 y.o. female presenting for well woman exam and follow up of recent lab work. BP: pt checks it regularly at home and never over 230 systolic she says. This morning 109/80. DIABETIC CARE CHECKLIST   1. Patient on ASA  no  2. Patient on Statin- Refuses 2/2 muscle pain on 3 different medications   3. Patient on ACE- no, refuses. BP on low side now. 4. Diet trying to eat salads since learning she has DM. 5. Exercise - 1-3 miles of walking daily, following [employer] videos  6. Blood pressure today - great   7. Last eye check  done recently 2 weeks ago, cataracts   8. Last cholesterol check - will check today  9 . Last HbA1c - last, >12, states she recently had one done with DM education and in the 6's! 10. Last urine microalbulmin- today   11. Last comprehensive foot exam   last visit, normal   12. Did patient receive pneumococcal vaccine -declines  13. Does the patient have a nephrologist- no  14. Does the patient have a cardiologist-no  15. Seasonal influenza vaccine - declines     Glucoses max 154. Trigger finger acting up. Also brings up tendon damage in L arm. Followed by ortho who recommended steroid injection but then realized her diabetes is poorly controlled so then recommended PT instead. Is considering surgery or steroid shots if no improvement. Allergies- reviewed: Allergies   Allergen Reactions    Latex Itching     inflammation    Augmentin [Amoxicillin-Pot Clavulanate] Hives         Medications- reviewed:   Current Outpatient Prescriptions   Medication Sig    meloxicam (MOBIC) 15 mg tablet Take 15 mg by mouth daily.  omeprazole (PRILOSEC) 20 mg capsule Take 20 mg by mouth daily.  metFORMIN ER (GLUCOPHAGE XR) 500 mg tablet Take 1 Tab by mouth two (2) times daily (with meals).  Blood-Glucose Meter monitoring kit Newly diagnosed DM. Check as needed. 1-2 per day.  Lancets misc Use as needed 1-2 per day.     glucose blood VI test strips (ASCENSIA AUTODISC VI, ONE TOUCH ULTRA TEST VI) strip Use as needed 1-2/day    fluticasone (FLONASE) 50 mcg/actuation nasal spray 2 Sprays by Both Nostrils route daily.  aspirin-acetaminophen-caffeine (EXCEDRIN ES) 250-250-65 mg per tablet Take 1 Tab by mouth every six (6) hours as needed for Pain.  omeprazole-sodium bicarbonate (ZEGERID OTC) 20-1.1 mg-gram cap Take 1 tablet by mouth daily.  dihydroergotamine (MIGRANAL) 0.5 mg/pump act. (4 mg/mL) nasal spray 1 spray by Nasal route as needed for Migraine. use in one nostril as directed. No more than 4 sprays in one hour     No current facility-administered medications for this visit. Past Medical History- reviewed:  Past Medical History:   Diagnosis Date    Diabetes [facility]     Neurological disorder     migraines    Sarcoidosis     Stickler's syndrome     daughter has-pt may have (genetic connective tissue disorder)         Past Surgical History- reviewed:   Past Surgical History:   Procedure Laterality Date    HX ORTHOPAEDIC Right     wrist         Family History - reviewed:  Family History   Problem Relation Age of Onset    Breast Cancer Sister          Social History - reviewed:  Social History     Social History    Marital status:      Spouse name: N/A    Number of children: N/A    Years of education: N/A     Occupational History    Not on file.      Social History Main Topics    Smoking status: Former Smoker    Smokeless tobacco: Never Used    Alcohol use No    Drug use: No    Sexual activity: Yes     Partners: Male     Other Topics Concern    Not on file     Social History Narrative         Immunizations - reviewed:   Immunization History   Administered Date(s) Administered    Tdap 12/11/2017     ROS:   No F, chills  No CP, palpitations, sob     Physical Exam  Visit Vitals    /79 (BP 1 Location: Left arm, BP Patient Position: Sitting)    Pulse (!) 104    Temp 98.5 °F (36.9 °C) (Oral)    Resp 16    Ht 5' 3\" (1.6 m)    Wt 150 lb (68 kg)    SpO2 100%    BMI 26.57 kg/m2       General appearance - alert, well appearing, and in no distress  Eyes - sclera anicteric  Neck - no JENNIFER, normal thyroid exam   Chest - clear to auscultation, no wheezes, rales or rhonchi, symmetric air entry  Heart - normal rate, regular rhythm, normal S1, S2, no murmurs, rubs, clicks or gallops  Neurological - alert, oriented, normal speech, no focal findings or movement disorder noted  Extremities - no pedal edema noted, no clubbing or cyanosis. Psych - normal mood and affect       Assessment/Plan:    ICD-10-CM ICD-9-CM    1. Uncontrolled type 2 diabetes mellitus without complication, without long-term current use of insulin ([facility]) E11.65 250.02 MICROALBUMIN, UR, RAND W/ MICROALB/CREAT RATIO      METABOLIC PANEL, COMPREHENSIVE      HEMOGLOBIN A1C WITH EAG      LIPID PANEL      metFORMIN ER (GLUCOPHAGE XR) 500 mg tablet      DISCONTINUED: metFORMIN ER (GLUCOPHAGE XR) 500 mg tablet      · Compliant with metformin. Worried about both lactic acidosis and ketoacidosis so spent some time today educating on both. Reassured patient that signs of either would show up on labs we are doing. · She refuses a statin. She has apparently failed 3 2/2 myalgias in the past.  She is unwilling to try a lower dose or different one. · She refuses pneumococcal or flu vaccines. · Con't ASA. Just had eye exam.  Did monofilament last visit. · Very proud of her exercising routine and weight loss! Follow-up Disposition: Not on File      I have discussed the diagnosis with the patient and the intended plan as seen in the above orders. The patient has received an after-visit summary and questions were answered concerning future plans. I have discussed medication side effects and warnings with the patient as well. Informed pt to return to the office if new symptoms arise.       Farooq Anders, DO

## 2018-04-20 NOTE — PROGRESS NOTES
Chief Complaint   Patient presents with    Diabetes     1. Have you been to the ER, urgent care clinic since your last visit? Hospitalized since your last visit? No    2. Have you seen or consulted any other health care providers outside of the [address] [place] [address] since your last visit? Include any pap smears or colon screening.  No

## 2018-04-21 LAB
ALBUMIN SERPL-MCNC: 4.6 G/DL (ref 3.5–5.5)
ALBUMIN/CREAT UR: <14.6 MG/G CREAT (ref 0–30)
ALBUMIN/GLOB SERPL: 1.9 {RATIO} (ref 1.2–2.2)
ALP SERPL-CCNC: 89 IU/L (ref 39–117)
ALT SERPL-CCNC: 15 IU/L (ref 0–32)
AST SERPL-CCNC: 20 IU/L (ref 0–40)
BILIRUB SERPL-MCNC: 0.3 MG/DL (ref 0–1.2)
BUN SERPL-MCNC: 11 MG/DL (ref 6–24)
BUN/CREAT SERPL: 19 (ref 9–23)
CALCIUM SERPL-MCNC: 9.8 MG/DL (ref 8.7–10.2)
CHLORIDE SERPL-SCNC: 101 MMOL/L (ref 96–106)
CHOLEST SERPL-MCNC: 223 MG/DL (ref 100–199)
CO2 SERPL-SCNC: 24 MMOL/L (ref 18–29)
CREAT SERPL-MCNC: 0.58 MG/DL (ref 0.57–1)
CREAT UR-MCNC: 20.6 MG/DL
EST. AVERAGE GLUCOSE BLD GHB EST-MCNC: 105 MG/DL
GFR SERPLBLD CREATININE-BSD FMLA CKD-EPI: 103 ML/MIN/1.73
GFR SERPLBLD CREATININE-BSD FMLA CKD-EPI: 118 ML/MIN/1.73
GLOBULIN SER CALC-MCNC: 2.4 G/DL (ref 1.5–4.5)
GLUCOSE SERPL-MCNC: 86 MG/DL (ref 65–99)
HBA1C MFR BLD: 5.3 % (ref 4.8–5.6)
HDLC SERPL-MCNC: 46 MG/DL
INTERPRETATION, 910389: NORMAL
LDLC SERPL CALC-MCNC: 128 MG/DL (ref 0–99)
Lab: NORMAL
Lab: NORMAL
MICROALBUMIN UR-MCNC: <3 UG/ML
POTASSIUM SERPL-SCNC: 4.5 MMOL/L (ref 3.5–5.2)
PROT SERPL-MCNC: 7 G/DL (ref 6–8.5)
SODIUM SERPL-SCNC: 141 MMOL/L (ref 134–144)
TRIGL SERPL-MCNC: 245 MG/DL (ref 0–149)
VLDLC SERPL CALC-MCNC: 49 MG/DL (ref 5–40)

## 2018-09-13 ENCOUNTER — HOSPITAL ENCOUNTER (OUTPATIENT)
Dept: DIABETES SERVICES | Age: 58
Discharge: HOME | End: 2018-09-13
Payer: COMMERCIAL

## 2018-09-13 DIAGNOSIS — E11.9 NEWLY DIAGNOSED DIABETES (HCC): ICD-10-CM

## 2018-09-13 PROCEDURE — G0109 DIAB MANAGE TRN IND/GROUP: HCPCS | Performed by: DIETITIAN, REGISTERED

## 2018-09-16 ENCOUNTER — APPOINTMENT (OUTPATIENT)
Dept: ULTRASOUND IMAGING | Age: 58
DRG: 442 | End: 2018-09-16
Attending: PHYSICIAN ASSISTANT
Payer: COMMERCIAL

## 2018-09-16 ENCOUNTER — HOSPITAL ENCOUNTER (INPATIENT)
Age: 58
LOS: 2 days | Discharge: HOME | DRG: 442 | End: 2018-09-18
Attending: EMERGENCY MEDICINE | Admitting: FAMILY MEDICINE
Payer: COMMERCIAL

## 2018-09-16 ENCOUNTER — APPOINTMENT (OUTPATIENT)
Dept: CT IMAGING | Age: 58
DRG: 442 | End: 2018-09-16
Attending: PHYSICIAN ASSISTANT
Payer: COMMERCIAL

## 2018-09-16 DIAGNOSIS — R17 ELEVATED BILIRUBIN: ICD-10-CM

## 2018-09-16 DIAGNOSIS — K75.9 HEPATITIS: Primary | ICD-10-CM

## 2018-09-16 PROBLEM — B17.9 ACUTE HEPATITIS: Status: ACTIVE | Noted: 2018-09-16

## 2018-09-16 PROBLEM — R74.01 TRANSAMINITIS: Status: ACTIVE | Noted: 2018-09-16

## 2018-09-16 LAB
ALBUMIN SERPL-MCNC: 3.8 G/DL (ref 3.5–5)
ALBUMIN/GLOB SERPL: 1 {RATIO} (ref 1.1–2.2)
ALP SERPL-CCNC: 382 U/L (ref 45–117)
ALT SERPL-CCNC: 590 U/L (ref 12–78)
ANION GAP SERPL CALC-SCNC: 11 MMOL/L (ref 5–15)
APAP SERPL-MCNC: <2 UG/ML (ref 10–30)
APPEARANCE UR: CLEAR
AST SERPL-CCNC: 303 U/L (ref 15–37)
BACTERIA URNS QL MICRO: NEGATIVE /HPF
BASOPHILS # BLD: 0 K/UL (ref 0–0.1)
BASOPHILS NFR BLD: 0 % (ref 0–1)
BILIRUB SERPL-MCNC: 2.6 MG/DL (ref 0.2–1)
BILIRUB UR QL CFM: POSITIVE
BUN SERPL-MCNC: 4 MG/DL (ref 6–20)
BUN/CREAT SERPL: 8 (ref 12–20)
CALCIUM SERPL-MCNC: 9.4 MG/DL (ref 8.5–10.1)
CHLORIDE SERPL-SCNC: 103 MMOL/L (ref 97–108)
CO2 SERPL-SCNC: 24 MMOL/L (ref 21–32)
COLOR UR: ABNORMAL
COMMENT, HOLDF: NORMAL
CREAT SERPL-MCNC: 0.5 MG/DL (ref 0.55–1.02)
DIFFERENTIAL METHOD BLD: ABNORMAL
EOSINOPHIL # BLD: 0.1 K/UL (ref 0–0.4)
EOSINOPHIL NFR BLD: 2 % (ref 0–7)
EPITH CASTS URNS QL MICRO: ABNORMAL /LPF
ERYTHROCYTE [DISTWIDTH] IN BLOOD BY AUTOMATED COUNT: 13.2 % (ref 11.5–14.5)
ETHANOL SERPL-MCNC: <10 MG/DL
GLOBULIN SER CALC-MCNC: 4 G/DL (ref 2–4)
GLUCOSE BLD STRIP.AUTO-MCNC: 108 MG/DL (ref 65–100)
GLUCOSE SERPL-MCNC: 143 MG/DL (ref 65–100)
GLUCOSE UR STRIP.AUTO-MCNC: NEGATIVE MG/DL
HCT VFR BLD AUTO: 41.5 % (ref 35–47)
HGB BLD-MCNC: 13.7 G/DL (ref 11.5–16)
HGB UR QL STRIP: NEGATIVE
HYALINE CASTS URNS QL MICRO: ABNORMAL /LPF (ref 0–5)
IMM GRANULOCYTES # BLD: 0 K/UL (ref 0–0.04)
IMM GRANULOCYTES NFR BLD AUTO: 0 % (ref 0–0.5)
INR PPP: 1.1 (ref 0.9–1.1)
KETONES UR QL STRIP.AUTO: ABNORMAL MG/DL
LEUKOCYTE ESTERASE UR QL STRIP.AUTO: NEGATIVE
LIPASE SERPL-CCNC: 168 U/L (ref 73–393)
LYMPHOCYTES # BLD: 0.7 K/UL (ref 0.8–3.5)
LYMPHOCYTES NFR BLD: 11 % (ref 12–49)
MCH RBC QN AUTO: 28.2 PG (ref 26–34)
MCHC RBC AUTO-ENTMCNC: 33 G/DL (ref 30–36.5)
MCV RBC AUTO: 85.6 FL (ref 80–99)
MONOCYTES # BLD: 0.4 K/UL (ref 0–1)
MONOCYTES NFR BLD: 6 % (ref 5–13)
NEUTS SEG # BLD: 5 K/UL (ref 1.8–8)
NEUTS SEG NFR BLD: 81 % (ref 32–75)
NITRITE UR QL STRIP.AUTO: NEGATIVE
NRBC # BLD: 0 K/UL (ref 0–0.01)
NRBC BLD-RTO: 0 PER 100 WBC
PH UR STRIP: 6 [PH] (ref 5–8)
PLATELET # BLD AUTO: 194 K/UL (ref 150–400)
PMV BLD AUTO: 9.7 FL (ref 8.9–12.9)
POTASSIUM SERPL-SCNC: 4 MMOL/L (ref 3.5–5.1)
PROT SERPL-MCNC: 7.8 G/DL (ref 6.4–8.2)
PROT UR STRIP-MCNC: NEGATIVE MG/DL
PROTHROMBIN TIME: 11.6 SEC (ref 9–11.1)
RBC # BLD AUTO: 4.85 M/UL (ref 3.8–5.2)
RBC #/AREA URNS HPF: ABNORMAL /HPF (ref 0–5)
RBC MORPH BLD: ABNORMAL
SALICYLATES SERPL-MCNC: <1.7 MG/DL (ref 2.8–20)
SAMPLES BEING HELD,HOLD: NORMAL
SERVICE CMNT-IMP: ABNORMAL
SODIUM SERPL-SCNC: 138 MMOL/L (ref 136–145)
SP GR UR REFRACTOMETRY: 1.01 (ref 1–1.03)
UR CULT HOLD, URHOLD: NORMAL
UROBILINOGEN UR QL STRIP.AUTO: 1 EU/DL (ref 0.2–1)
WBC # BLD AUTO: 6.2 K/UL (ref 3.6–11)
WBC URNS QL MICRO: ABNORMAL /HPF (ref 0–4)

## 2018-09-16 PROCEDURE — 74177 CT ABD & PELVIS W/CONTRAST: CPT

## 2018-09-16 PROCEDURE — 93005 ELECTROCARDIOGRAM TRACING: CPT

## 2018-09-16 PROCEDURE — 82962 GLUCOSE BLOOD TEST: CPT

## 2018-09-16 PROCEDURE — 82728 ASSAY OF FERRITIN: CPT | Performed by: STUDENT IN AN ORGANIZED HEALTH CARE EDUCATION/TRAINING PROGRAM

## 2018-09-16 PROCEDURE — 80053 COMPREHEN METABOLIC PANEL: CPT | Performed by: EMERGENCY MEDICINE

## 2018-09-16 PROCEDURE — 85025 COMPLETE CBC W/AUTO DIFF WBC: CPT | Performed by: EMERGENCY MEDICINE

## 2018-09-16 PROCEDURE — 36415 COLL VENOUS BLD VENIPUNCTURE: CPT | Performed by: PHYSICIAN ASSISTANT

## 2018-09-16 PROCEDURE — 76705 ECHO EXAM OF ABDOMEN: CPT

## 2018-09-16 PROCEDURE — 96374 THER/PROPH/DIAG INJ IV PUSH: CPT

## 2018-09-16 PROCEDURE — 87389 HIV-1 AG W/HIV-1&-2 AB AG IA: CPT | Performed by: STUDENT IN AN ORGANIZED HEALTH CARE EDUCATION/TRAINING PROGRAM

## 2018-09-16 PROCEDURE — 80307 DRUG TEST PRSMV CHEM ANLYZR: CPT | Performed by: STUDENT IN AN ORGANIZED HEALTH CARE EDUCATION/TRAINING PROGRAM

## 2018-09-16 PROCEDURE — 85610 PROTHROMBIN TIME: CPT | Performed by: EMERGENCY MEDICINE

## 2018-09-16 PROCEDURE — 96361 HYDRATE IV INFUSION ADD-ON: CPT

## 2018-09-16 PROCEDURE — 74011250636 HC RX REV CODE- 250/636: Performed by: PHYSICIAN ASSISTANT

## 2018-09-16 PROCEDURE — 82390 ASSAY OF CERULOPLASMIN: CPT | Performed by: STUDENT IN AN ORGANIZED HEALTH CARE EDUCATION/TRAINING PROGRAM

## 2018-09-16 PROCEDURE — 81001 URINALYSIS AUTO W/SCOPE: CPT | Performed by: PHYSICIAN ASSISTANT

## 2018-09-16 PROCEDURE — 99285 EMERGENCY DEPT VISIT HI MDM: CPT

## 2018-09-16 PROCEDURE — 80074 ACUTE HEPATITIS PANEL: CPT | Performed by: PHYSICIAN ASSISTANT

## 2018-09-16 PROCEDURE — 74011636320 HC RX REV CODE- 636/320: Performed by: RADIOLOGY

## 2018-09-16 PROCEDURE — 65660000000 HC RM CCU STEPDOWN

## 2018-09-16 PROCEDURE — 83540 ASSAY OF IRON: CPT | Performed by: STUDENT IN AN ORGANIZED HEALTH CARE EDUCATION/TRAINING PROGRAM

## 2018-09-16 PROCEDURE — 83690 ASSAY OF LIPASE: CPT | Performed by: PHYSICIAN ASSISTANT

## 2018-09-16 PROCEDURE — 74011250636 HC RX REV CODE- 250/636: Performed by: STUDENT IN AN ORGANIZED HEALTH CARE EDUCATION/TRAINING PROGRAM

## 2018-09-16 RX ORDER — SODIUM CHLORIDE 0.9 % (FLUSH) 0.9 %
5-10 SYRINGE (ML) INJECTION AS NEEDED
Status: DISCONTINUED | OUTPATIENT
Start: 2018-09-16 | End: 2018-09-18 | Stop reason: HOSPADM

## 2018-09-16 RX ORDER — SODIUM CHLORIDE 0.9 % (FLUSH) 0.9 %
5-10 SYRINGE (ML) INJECTION EVERY 8 HOURS
Status: DISCONTINUED | OUTPATIENT
Start: 2018-09-16 | End: 2018-09-18 | Stop reason: HOSPADM

## 2018-09-16 RX ORDER — DEXTROSE 50 % IN WATER (D50W) INTRAVENOUS SYRINGE
12.5-25 AS NEEDED
Status: DISCONTINUED | OUTPATIENT
Start: 2018-09-16 | End: 2018-09-18 | Stop reason: HOSPADM

## 2018-09-16 RX ORDER — ENOXAPARIN SODIUM 100 MG/ML
40 INJECTION SUBCUTANEOUS EVERY 24 HOURS
Status: DISCONTINUED | OUTPATIENT
Start: 2018-09-16 | End: 2018-09-18 | Stop reason: HOSPADM

## 2018-09-16 RX ORDER — PANTOPRAZOLE SODIUM 40 MG/1
40 TABLET, DELAYED RELEASE ORAL DAILY
Status: DISCONTINUED | OUTPATIENT
Start: 2018-09-17 | End: 2018-09-18 | Stop reason: HOSPADM

## 2018-09-16 RX ORDER — MAGNESIUM SULFATE 100 %
4 CRYSTALS MISCELLANEOUS AS NEEDED
Status: DISCONTINUED | OUTPATIENT
Start: 2018-09-16 | End: 2018-09-18 | Stop reason: HOSPADM

## 2018-09-16 RX ORDER — ONDANSETRON 2 MG/ML
4 INJECTION INTRAMUSCULAR; INTRAVENOUS
Status: COMPLETED | OUTPATIENT
Start: 2018-09-16 | End: 2018-09-16

## 2018-09-16 RX ORDER — SODIUM CHLORIDE 9 MG/ML
100 INJECTION, SOLUTION INTRAVENOUS CONTINUOUS
Status: DISCONTINUED | OUTPATIENT
Start: 2018-09-16 | End: 2018-09-18

## 2018-09-16 RX ORDER — INSULIN LISPRO 100 [IU]/ML
INJECTION, SOLUTION INTRAVENOUS; SUBCUTANEOUS
Status: DISCONTINUED | OUTPATIENT
Start: 2018-09-16 | End: 2018-09-18 | Stop reason: HOSPADM

## 2018-09-16 RX ADMIN — SODIUM CHLORIDE 1000 ML: 900 INJECTION, SOLUTION INTRAVENOUS at 16:49

## 2018-09-16 RX ADMIN — Medication 10 ML: at 21:52

## 2018-09-16 RX ADMIN — SODIUM CHLORIDE 100 ML/HR: 900 INJECTION, SOLUTION INTRAVENOUS at 21:55

## 2018-09-16 RX ADMIN — IOPAMIDOL 100 ML: 755 INJECTION, SOLUTION INTRAVENOUS at 19:26

## 2018-09-16 RX ADMIN — ONDANSETRON 4 MG: 2 INJECTION, SOLUTION INTRAMUSCULAR; INTRAVENOUS at 19:13

## 2018-09-16 NOTE — ED PROVIDER NOTES
HPI Comments: Darnell Menard is a 62 y.o. female who presents ambulatory to the ED with a c/o fatigue, nausea onset today. Pt reports that she was seen at [facility] today for sx and was advised to come in due to elevated liver enzymes. Pt states that she was prescribed Keflex on 9/13/18 for cellulitis and was told to take it for 1 week. She reports feeling increasingly fatigued and nauseous, \"burping\", and additionally notes dark colored urine. Pt specifically denies chest pain, shortness of breath, vomiting, diarrhea, fever, chills, numbness, tingling, abdominal pain, back pain, cough, leg swelling, dizziness or any other acute sx. Of note, pt states that she takes Metformin. Pt denies any recent travel, known sick contacts, or recent illness. PCP: Berto Romero DO 
PMHx significant for: Sarcoidosis, Stickler's syndrome, DM, Migraines PSHx significant for: Rt Wrist Surgery Social Hx: Tobacco: Former smoker EtOH: none Illicit drug use: none There are no further complaints or symptoms at this time. Signed by: satish Tapiaibpamela for [employer] PA-C on September 16th, 2018 at 16:34pm. 
 
 
The history is provided by the patient and medical records. No  was used. Past Medical History:  
Diagnosis Date  Diabetes ([address]  Neurological disorder   
 migraines  Sarcoidosis  Stickler's syndrome   
 daughter has-pt may have (genetic connective tissue disorder) Past Surgical History:  
Procedure Laterality Date  HX ORTHOPAEDIC Right   
 wrist  
 
   
Family History:  
Problem Relation Age of Onset  Breast Cancer Sister Social History Social History  Marital status:  Spouse name: N/A  
 Number of children: N/A  
 Years of education: N/A Occupational History  Not on file. Social History Main Topics  Smoking status: Former Smoker  Smokeless tobacco: Never Used  Alcohol use No  
 Drug use:  No  
  Sexual activity: Yes  
  Partners: Male Other Topics Concern  Not on file Social History Narrative ALLERGIES: Latex and Augmentin [amoxicillin-pot clavulanate] Review of Systems Constitutional: Positive for fatigue. Negative for chills and fever. HENT: Negative. Eyes: Negative. Respiratory: Negative. Negative for cough and shortness of breath. Cardiovascular: Negative. Gastrointestinal: Positive for nausea. Negative for abdominal pain, diarrhea and vomiting. Endocrine: Negative. Genitourinary: Negative. Negative for dysuria and hematuria. Positive for dark urine Musculoskeletal: Negative. Negative for back pain and neck pain. Skin: Negative. Allergic/Immunologic: Negative. Neurological: Negative. Hematological: Negative. Psychiatric/Behavioral: Negative. All other systems reviewed and are negative. Vitals:  
 09/16/18 1613 BP: 164/83 Pulse: (!) 110 Resp: 20 Temp: 98.1 °F (36.7 °C) SpO2: 99% Weight: 62.6 kg (138 lb) Height: 5' 3\" (1.6 m) Physical Exam  
Constitutional: She is oriented to person, place, and time. She appears well-developed. Non-toxic appearance. She does not have a sickly appearance. She does not appear ill. No distress. HENT:  
Head: Normocephalic and atraumatic. Right Ear: External ear normal.  
Left Ear: External ear normal.  
Nose: Nose normal.  
Mouth/Throat: Oropharynx is clear and moist. No oropharyngeal exudate. Eyes: Conjunctivae, EOM and lids are normal. Right eye exhibits no discharge. Left eye exhibits no discharge. No scleral icterus. Neck: Normal range of motion. No tracheal deviation present. No thyromegaly present. Cardiovascular: Normal rate, regular rhythm, normal heart sounds and intact distal pulses. Pulmonary/Chest: Effort normal and breath sounds normal.  
Abdominal: Soft. Normal appearance and bowel sounds are normal. There is no tenderness. Musculoskeletal: Normal range of motion. Neurological: She is alert and oriented to person, place, and time. Skin: Skin is warm and dry. Psychiatric: She has a normal mood and affect. Judgment normal.  
  
 
MDM Number of Diagnoses or Management Options Elevated bilirubin:  
Hepatitis:  
Diagnosis management comments:  
Patient is being admitted to the hospital.  The results of their tests and reasons for their admission have been discussed with them and/or available family. They convey agreement and understanding for the need to be admitted and for their admission diagnosis. Consultation has been made with the inpatient physician specialist for hospitalization. LABORATORY TESTS: 
Recent Results (from the past 12 hour(s)) -SAMPLES BEING HELD Collection Time: 09/16/18  4:46 PM 
     Result                                            Value                         Ref Range SAMPLES BEING HELD                                sst                                                     
     COMMENT Add-on orders for these samples will be processed based on acceptable specimen integrity and analyte stability, which may vary by analyte. -LIPASE Collection Time: 09/16/18  4:46 PM 
     Result                                            Value                         Ref Range Lipase                                            168                           73 - 393 U/L              
-CBC WITH AUTOMATED DIFF Collection Time: 09/16/18  4:46 PM 
     Result                                            Value                         Ref Range WBC                                               6.2                           3.6 - 11.0 K/uL           
     RBC                                               4.85                          3.80 - 5.20 M/uL HGB                                               13.7                          11.5 - 16.0 g/dL HCT                                               41.5                          35.0 - 47.0 % MCV                                               85.6                          80.0 - 99.0 FL            
     MCH                                               28.2                          26.0 - 34.0 PG            
     MCHC                                              33.0                          30.0 - 36.5 g/dL RDW                                               13.2                          11.5 - 14.5 % PLATELET                                          194                           150 - 400 K/uL MPV                                               9.7                           8.9 - 12.9 FL             
     NRBC                                              0.0                           0  WBC ABSOLUTE NRBC                                     0.00                          0.00 - 0.01 K/uL NEUTROPHILS                                       81 (H)                        32 - 75 % LYMPHOCYTES                                       11 (L)                        12 - 49 % MONOCYTES                                         6                             5 - 13 % EOSINOPHILS                                       2                             0 - 7 % BASOPHILS                                         0                             0 - 1 % IMMATURE GRANULOCYTES                             0                             0.0 - 0.5 % ABS. NEUTROPHILS                                  5.0                           1.8 - 8.0 K/UL ABS. LYMPHOCYTES                                  0.7 (L)                       0.8 - 3.5 K/UL            
     ABS. MONOCYTES                                    0.4                           0.0 - 1.0 K/UL            
     ABS. EOSINOPHILS                                  0.1                           0.0 - 0.4 K/UL            
     ABS. BASOPHILS                                    0.0                           0.0 - 0.1 K/UL            
     ABS. IMM. GRANS.                                  0.0                           0.00 - 0.04 K/UL          
     DF                                                SMEAR SCANNED                                           
     RBC COMMENTS                                      NORMOCYTIC, NORMOCHROMIC                                
-METABOLIC PANEL, COMPREHENSIVE Collection Time: 09/16/18  4:46 PM 
     Result                                            Value                         Ref Range Sodium                                            138                           136 - 145 mmol/L Potassium                                         4.0                           3.5 - 5.1 mmol/L Chloride                                          103                           97 - 108 mmol/L           
     CO2                                               24                            21 - 32 mmol/L Anion gap                                         11                            5 - 15 mmol/L Glucose                                           143 (H)                       65 - 100 mg/dL BUN                                               4 (L)                         6 - 20 MG/DL      Creatinine                                        0.50 (L)                      0.55 - 1.02 MG/DL         
     BUN/Creatinine ratio                              8 (L) 12 - 20 GFR est AA                                        >60                           >60 ml/min/1.73m2 GFR est non-AA                                    >60                           >60 ml/min/1.73m2 Calcium                                           9.4                           8.5 - 10.1 MG/DL Bilirubin, total                                  2.6 (H)                       0.2 - 1.0 MG/DL           
     ALT (SGPT)                                        590 (H)                       12 - 78 U/L               
     AST (SGOT)                                        303 (H)                       15 - 37 U/L Alk. phosphatase                                  382 (H)                       45 - 117 U/L Protein, total                                    7.8                           6.4 - 8.2 g/dL Albumin                                           3.8                           3.5 - 5.0 g/dL Globulin                                          4.0                           2.0 - 4.0 g/dL A-G Ratio                                         1.0 (L)                       1.1 - 2.2                 
-URINALYSIS W/MICROSCOPIC Collection Time: 09/16/18  5:25 PM 
     Result                                            Value                         Ref Range Color                                             YELLOW/STRAW Appearance                                        CLEAR                         CLEAR Specific gravity                                  1.010                         1.003 - 1.030             
     pH (UA)                                           6.0                           5.0 - 8.0 Protein                                           NEGATIVE                      NEG mg/dL Glucose                                           NEGATIVE                      NEG mg/dL Ketone                                            TRACE (A)                     NEG mg/dL Blood                                             NEGATIVE                      NEG Urobilinogen                                      1.0                           0.2 - 1.0 EU/dL Nitrites                                          NEGATIVE                      NEG Leukocyte Esterase                                NEGATIVE                      NEG                       
     WBC                                               0-4                           0 - 4 /hpf                
     RBC                                               0-5                           0 - 5 /hpf Epithelial cells                                  FEW                           FEW /lpf Bacteria                                          NEGATIVE                      NEG /hpf Hyaline cast                                      0-2                           0 - 5 /lpf                
-URINE CULTURE HOLD SAMPLE Collection Time: 09/16/18  5:25 PM 
     Result                                            Value                         Ref Range Urine culture hold URINE ON HOLD IN MICROBIOLOGY DEPT FOR 3 DAYS. IF UNPRESERVED URINE IS SUBMITTED, IT CANNOT BE USED FOR ADDITIONAL TESTING AFTER 24 HRS, RECOLLECTION WILL BE REQUIRED. -BILIRUBIN, CONFIRM Collection Time: 09/16/18  5:25 PM 
     Result                                            Value Ref Range Bilirubin UA, confirm                             POSITIVE (A)                  NEG                       
-PROTHROMBIN TIME + INR Collection Time: 09/16/18  7:15 PM 
     Result                                            Value                         Ref Range INR                                               1.1                           0.9 - 1.1 Prothrombin time                                  11.6 (H)                      9.0 - 11.1 sec -GLUCOSE, POC Collection Time: 09/16/18  9:48 PM 
     Result                                            Value                         Ref Range Glucose (POC)                                     108 (H)                       65 - 100 mg/dL Performed by                                      [facility] RESULTS: 
See chart MEDICATIONS GIVEN: 
Medications 
sodium chloride (NS) flush 5-10 mL (10 mL IntraVENous Given 9/16/18 2152) 
sodium chloride (NS) flush 5-10 mL (not administered) 
enoxaparin (LOVENOX) injection 40 mg (40 mg SubCUTAneous Refused 9/16/18 2151) pantoprazole (PROTONIX) tablet 40 mg (not administered) 
insulin lispro (HUMALOG) injection (0 Units SubCUTAneous Held 9/16/18 2200) glucose chewable tablet 16 g (not administered) dextrose (D50W) injection syrg 12.5-25 g (not administered) glucagon (GLUCAGEN) injection 1 mg (not administered) 0.9% sodium chloride infusion (100 mL/hr IntraVENous New Bag 9/16/18 2155) 
sodium chloride 0.9 % bolus infusion 1,000 mL (0 mL IntraVENous IV Completed 9/16/18 1913) ondansetron [facility]) injection 4 mg (4 mg IntraVENous Given 9/16/18 1913) iopamidol (ISOVUE-370) 76 % injection 100 mL (100 mL IntraVENous Given 9/16/18 1926) IMPRESSION: 
Hepatitis  (primary encounter diagnosis) Elevated bilirubin PLAN: 
 1. Admit to hospital for further evaluation and treatment Amount and/or Complexity of Data Reviewed Clinical lab tests: ordered and reviewed Tests in the radiology section of CPT®: reviewed and ordered Tests in the medicine section of CPT®: ordered and reviewed ED Course Procedures CONSULT NOTE:  
8:13 PM 
Laverne Isbell PA-C spoke with Family practice resident, Specialty: Family practice Discussed pt's hx, disposition, and available diagnostic and imaging results. Reviewed care plans. Consultant agrees with plans as outlined. Will see for admission.

## 2018-09-16 NOTE — ED TRIAGE NOTES
[facility] with elevated liver enzymes after taking keflex. Pt was being treated for cellulitis on her neck and went to [facility] for dark colored urine, fatigue, and nausea.

## 2018-09-16 NOTE — IP AVS SNAPSHOT
[facility] 
 
 
 [address] [place] [address] 
[phone] Patient: Benigno Sandoval MRN: [id number] PAP: About your hospitalization You were admitted on:  2018 You last received care in the:  [facility] 5M1 MED SURG 1 You were discharged on:  2018 Why you were hospitalized Your primary diagnosis was:  Acute Hepatitis Your diagnoses also included:  Transaminitis Follow-up Information Follow up With Details Comments Contact Info Artis Michael MD Go on 2018 appt at 10:45 AM please arrive 15 minutes early [place] Maya [address] [place] [address] 
[phone] Kiesha Denise MD Schedule an appointment as soon as possible for a visit follow up of joint pain  [address] 
[phone] Your Scheduled Appointments 2018 10:45 AM EDT TRANSITIONAL CARE MANAGEMENT with Artsi Michael MD  
[address] [place] [address]  
[phone] Discharge Orders None A check herbert indicates which time of day the medication should be taken. My Medications CONTINUE taking these medications Instructions Each Dose to Equal  
 Morning Noon Evening Bedtime  
 metFORMIN  mg tablet Commonly known as:  GLUCOPHAGE XR Take 1 Tab by mouth two (2) times daily (with meals). 500 mg  
    
  
   
   
  
   
  
 omeprazole 20 mg capsule Commonly known as:  PRILOSEC Take 20 mg by mouth daily. 20 mg Discharge Instructions HOME DISCHARGE INSTRUCTIONS Benigno Sandoval / [id number] : 1960 Admission date: 2018 Discharge date: 2018 Please bring this form with you to show your care provider at your follow-up appointment.  
 
Primary care provider:  Radha Gutierrez DO 
 
 Discharging provider:  China Cobos MD  - Family Medicine Resident Dr. Cheyenne Gallo  - Attending, Family Medicine You have been admitted to the hospital with the following diagnoses: 
 
ACUTE DIAGNOSES: 
Transaminitis Beny Reasoner . . . . . . . . . . . . . . . . . . . . . . . . . . . . . . . . . . . . . . . . . . . . . . . . . . . . . . . . . . . . . . . . . . . . . . . Medications:  
-STOP taking Keflex FOLLOW-UP CARE RECOMMENDATIONS: 
 
Appointments Follow-up Information Follow up With Details Comments Contact Info Uriel Mendez MD Go on 9/21/2018 appt at 10:45 AM please arrive 15 minutes early [address] 
[phone] Justin Klein MD Schedule an appointment as soon as possible for a visit follow up of joint pain  [address] [facility] [address] 
[phone] Follow-up tests needed: repeat CMP in 10-14 days Pending test results: At the time of your discharge the following test results are still pending: JAMESON, mitrochondrial AB, smooth muscle antibody Please make sure you review these results with your outpatient follow-up provider(s). Specific symptoms to watch for: chest pain, shortness of breath, fever, chills, nausea, vomiting, diarrhea, change in mentation, falling, weakness, bleeding. DIET/what to eat:  Regular Diet ACTIVITY:  Activity as tolerated Wound care: none Equipment needed:  none What to do if new or unexpected symptoms occur? If you experience any of the above symptoms (or should other concerns or questions arise after discharge) please call your primary care physician. Return to the emergency room if you cannot get hold of your doctor. · It is very important that you keep your follow-up appointment(s). · Please bring discharge papers, medication list (and/or medication bottles) to your follow-up appointments for review by your outpatient provider(s). · Please check the list of medications and be sure it includes every medication (even non-prescription medications) that your provider wants you to take. · It is important that you take the medication exactly as they are prescribed. · Keep your medication in the bottles provided by the pharmacist and keep a list of the medication names, dosages, and times to be taken in your wallet. · Do not take other medications without consulting your doctor. · If you have any questions about your medications or other instructions, please talk to your nurse or care provider before you leave the hospital.  
 
Information obtained by:  
 
I understand that if any problems occur once I am at home I am to contact my physician. These instructions were explained to me and I had the opportunity to ask questions. I understand and acknowledge receipt of the instructions indicated above. Physician's or R.N.'s Signature                                                                  Date/Time Patient or Representative Signature                                                          Date/Time [employer] Announcement We are excited to announce that we are making your provider's discharge notes available to you in "[employer]. You will see these notes when they are completed and signed by the physician that discharged you from your recent hospital stay. If you have any questions or concerns about any information you see in [employer], please call the Health Information Department where you were seen or reach out to your Primary Care Provider for more information about your plan of care. Introducing \[facility] [place] [facility]\"" & HEALTH SERVICES! Dear Sera Parents: Thank you for requesting a Therapy[employer] account. Our records indicate that you already have an active Therapydia account. You can access your account anytime at [email or web address] Did you know that you can access your hospital and ER discharge instructions at any time in Therapydia? You can also review all of your test results from your hospital stay or ER visit. Additional Information If you have questions, please visit the Frequently Asked Questions section of the Therapydia website at [email or web address] Remember, Therapydia is NOT to be used for urgent needs. For medical emergencies, dial 911. Now available from your iPhone and Android! Introducing Serge Smith As a [facility] patient, I wanted to make you aware of our electronic visit tool called Serge Smith. DavisZ2 allows you to connect within minutes with a medical provider 24 hours a day, seven days a week via a mobile device or tablet or logging into a secure website from your computer. You can access Serge Smith from anywhere in the [place]. A virtual visit might be right for you when you have a simple condition and feel like you just dont want to get out of bed, or cant get away from work for an appointment, when your regular [facility] provider is not available (evenings, weekends or holidays), or when youre out of town and need minor care. Electronic visits cost only $49 and if the [employer] provider determines a prescription is needed to treat your condition, one can be electronically transmitted to a nearby pharmacy*. Please take a moment to enroll today if you have not already done so. The enrollment process is free and takes just a few minutes. To enroll, please download the [employer] saulo to your tablet or phone, or visit [email or web address] to enroll on your computer. And, as an [address] [place] [address] patient with a Plaid account, the results of your visits will be scanned into your electronic medical record and your primary care provider will be able to view the scanned results. We urge you to continue to see your regular [employer] provider for your ongoing medical care. And while your primary care provider may not be the one available when you seek a Serge Avelarfin virtual visit, the peace of mind you get from getting a real diagnosis real time can be priceless. For more information on [employer], view our Frequently Asked Questions (FAQs) at [email or web address]. Sincerely, 
 
Olena Willis MD 
Chief Medical Officer Michelle Leigh Glover *:  certain medications cannot be prescribed via Yedda Unresulted Labs-Please follow up with your PCP about these lab tests Order Current Status ACTIN (SMOOTH MUSCLE) ANTIBODY In process ANTINUCLEAR ANTIBODIES, IFA In process MITOCHONDRIAL M2 AB In process Providers Seen During Your Hospitalization Provider Specialty Primary office phone Tricia Haque MD Emergency Medicine [phone] Jhoana Dinero DO [facility] [phone] Your Primary Care Physician (PCP) Primary Care Physician Office Phone Office Fax [address] [facility] [phone] You are allergic to the following Allergen Reactions Latex Itching  
 inflammation Augmentin (Amoxicillin-Pot Clavulanate) Hives Keflex (Cephalexin) Other (comments) Elevated LFT's  
  
Recent Documentation Height Weight Breastfeeding? BMI OB Status Smoking Status 1.6 m 62.3 kg No 24.33 kg/m2 Postmenopausal Former Smoker Emergency Contacts Name Discharge Info Relation Home Work Mobile Kahlil Chavez DISCHARGE CAREGIVER [3] Spouse [3]   [phone] Patient Belongings The following personal items are in your possession at time of discharge: 
  Dental Appliances: None  Visual Aid: Glasses, With patient      Home Medications: None   Jewelry: Ring, With patient  Clothing: Pants, Shirt, Socks, Undergarments, Footwear    Other Valuables: Cell Phone, Chichi, Joseph Soriano, [address] [facility], [employer], At bedside, With patient Please provide this summary of care documentation to your next provider. Signatures-by signing, you are acknowledging that this After Visit Summary has been reviewed with you and you have received a copy. Patient Signature:  ____________________________________________________________ Date:  ____________________________________________________________  
  
[facility] Provider Signature:  ____________________________________________________________ Date:  ____________________________________________________________

## 2018-09-17 LAB
ALBUMIN SERPL-MCNC: 3.6 G/DL (ref 3.5–5)
ALBUMIN/GLOB SERPL: 0.9 {RATIO} (ref 1.1–2.2)
ALP SERPL-CCNC: 368 U/L (ref 45–117)
ALT SERPL-CCNC: 488 U/L (ref 12–78)
ANION GAP SERPL CALC-SCNC: 7 MMOL/L (ref 5–15)
AST SERPL-CCNC: 214 U/L (ref 15–37)
ATRIAL RATE: 101 BPM
BASOPHILS # BLD: 0 K/UL (ref 0–0.1)
BASOPHILS NFR BLD: 1 % (ref 0–1)
BILIRUB SERPL-MCNC: 2.6 MG/DL (ref 0.2–1)
BUN SERPL-MCNC: 4 MG/DL (ref 6–20)
BUN/CREAT SERPL: 9 (ref 12–20)
CALCIUM SERPL-MCNC: 9.2 MG/DL (ref 8.5–10.1)
CALCULATED P AXIS, ECG09: 41 DEGREES
CALCULATED R AXIS, ECG10: 4 DEGREES
CALCULATED T AXIS, ECG11: 35 DEGREES
CHLORIDE SERPL-SCNC: 104 MMOL/L (ref 97–108)
CO2 SERPL-SCNC: 29 MMOL/L (ref 21–32)
CREAT SERPL-MCNC: 0.47 MG/DL (ref 0.55–1.02)
DIAGNOSIS, 93000: NORMAL
DIFFERENTIAL METHOD BLD: NORMAL
EOSINOPHIL # BLD: 0.2 K/UL (ref 0–0.4)
EOSINOPHIL NFR BLD: 3 % (ref 0–7)
ERYTHROCYTE [DISTWIDTH] IN BLOOD BY AUTOMATED COUNT: 13.4 % (ref 11.5–14.5)
EST. AVERAGE GLUCOSE BLD GHB EST-MCNC: 103 MG/DL
FERRITIN SERPL-MCNC: 139 NG/ML (ref 8–252)
GLOBULIN SER CALC-MCNC: 3.8 G/DL (ref 2–4)
GLUCOSE BLD STRIP.AUTO-MCNC: 104 MG/DL (ref 65–100)
GLUCOSE BLD STRIP.AUTO-MCNC: 120 MG/DL (ref 65–100)
GLUCOSE BLD STRIP.AUTO-MCNC: 121 MG/DL (ref 65–100)
GLUCOSE BLD STRIP.AUTO-MCNC: 130 MG/DL (ref 65–100)
GLUCOSE SERPL-MCNC: 98 MG/DL (ref 65–100)
HAV IGM SER QL: NONREACTIVE
HBA1C MFR BLD: 5.2 % (ref 4.2–6.3)
HBV CORE IGM SER QL: NONREACTIVE
HBV SURFACE AG SER QL: <0.1 INDEX
HBV SURFACE AG SER QL: NEGATIVE
HCT VFR BLD AUTO: 38.9 % (ref 35–47)
HCV AB SERPL QL IA: NONREACTIVE
HCV COMMENT,HCGAC: NORMAL
HGB BLD-MCNC: 12.9 G/DL (ref 11.5–16)
HIV 1+2 AB+HIV1 P24 AG SERPL QL IA: NONREACTIVE
HIV12 RESULT COMMENT, HHIVC: NORMAL
IMM GRANULOCYTES # BLD: 0 K/UL (ref 0–0.04)
IMM GRANULOCYTES NFR BLD AUTO: 0 % (ref 0–0.5)
IRON SATN MFR SERPL: 18 % (ref 20–50)
IRON SERPL-MCNC: 52 UG/DL (ref 35–150)
LYMPHOCYTES # BLD: 1 K/UL (ref 0.8–3.5)
LYMPHOCYTES NFR BLD: 20 % (ref 12–49)
MCH RBC QN AUTO: 28.5 PG (ref 26–34)
MCHC RBC AUTO-ENTMCNC: 33.2 G/DL (ref 30–36.5)
MCV RBC AUTO: 86.1 FL (ref 80–99)
MONOCYTES # BLD: 0.4 K/UL (ref 0–1)
MONOCYTES NFR BLD: 7 % (ref 5–13)
NEUTS SEG # BLD: 3.7 K/UL (ref 1.8–8)
NEUTS SEG NFR BLD: 70 % (ref 32–75)
NRBC # BLD: 0 K/UL (ref 0–0.01)
NRBC BLD-RTO: 0 PER 100 WBC
P-R INTERVAL, ECG05: 160 MS
PLATELET # BLD AUTO: 183 K/UL (ref 150–400)
PMV BLD AUTO: 9.4 FL (ref 8.9–12.9)
POTASSIUM SERPL-SCNC: 3.8 MMOL/L (ref 3.5–5.1)
PROT SERPL-MCNC: 7.4 G/DL (ref 6.4–8.2)
Q-T INTERVAL, ECG07: 342 MS
QRS DURATION, ECG06: 74 MS
QTC CALCULATION (BEZET), ECG08: 443 MS
RBC # BLD AUTO: 4.52 M/UL (ref 3.8–5.2)
SERVICE CMNT-IMP: ABNORMAL
SODIUM SERPL-SCNC: 140 MMOL/L (ref 136–145)
SP1: NORMAL
SP2: NORMAL
SP3: NORMAL
TIBC SERPL-MCNC: 297 UG/DL (ref 250–450)
VENTRICULAR RATE, ECG03: 101 BPM
WBC # BLD AUTO: 5.3 K/UL (ref 3.6–11)

## 2018-09-17 PROCEDURE — 80053 COMPREHEN METABOLIC PANEL: CPT | Performed by: STUDENT IN AN ORGANIZED HEALTH CARE EDUCATION/TRAINING PROGRAM

## 2018-09-17 PROCEDURE — 74011250637 HC RX REV CODE- 250/637: Performed by: FAMILY MEDICINE

## 2018-09-17 PROCEDURE — 74011250637 HC RX REV CODE- 250/637: Performed by: STUDENT IN AN ORGANIZED HEALTH CARE EDUCATION/TRAINING PROGRAM

## 2018-09-17 PROCEDURE — 82962 GLUCOSE BLOOD TEST: CPT

## 2018-09-17 PROCEDURE — 83036 HEMOGLOBIN GLYCOSYLATED A1C: CPT | Performed by: FAMILY MEDICINE

## 2018-09-17 PROCEDURE — 97535 SELF CARE MNGMENT TRAINING: CPT

## 2018-09-17 PROCEDURE — 97165 OT EVAL LOW COMPLEX 30 MIN: CPT

## 2018-09-17 PROCEDURE — 83516 IMMUNOASSAY NONANTIBODY: CPT | Performed by: PHYSICIAN ASSISTANT

## 2018-09-17 PROCEDURE — 36415 COLL VENOUS BLD VENIPUNCTURE: CPT | Performed by: PHYSICIAN ASSISTANT

## 2018-09-17 PROCEDURE — 85025 COMPLETE CBC W/AUTO DIFF WBC: CPT | Performed by: STUDENT IN AN ORGANIZED HEALTH CARE EDUCATION/TRAINING PROGRAM

## 2018-09-17 PROCEDURE — 65660000000 HC RM CCU STEPDOWN

## 2018-09-17 RX ORDER — FAMOTIDINE 20 MG/1
20 TABLET, FILM COATED ORAL DAILY
Status: DISCONTINUED | OUTPATIENT
Start: 2018-09-17 | End: 2018-09-18 | Stop reason: HOSPADM

## 2018-09-17 RX ADMIN — FAMOTIDINE 20 MG: 20 TABLET, FILM COATED ORAL at 13:18

## 2018-09-17 RX ADMIN — Medication 10 ML: at 14:00

## 2018-09-17 RX ADMIN — Medication 10 ML: at 06:00

## 2018-09-17 RX ADMIN — Medication 10 ML: at 22:00

## 2018-09-17 NOTE — PROGRESS NOTES
Problem: Falls - Risk of 
Goal: *Absence of Falls Document Jeannine Elam Fall Risk and appropriate interventions in the flowsheet. Outcome: Progressing Towards Goal 
Fall Risk Interventions:

## 2018-09-17 NOTE — PROGRESS NOTES
Bedside and Verbal shift change report given to [employer]  (oncoming nurse) by Iván Linares  (offgoing nurse). Report included the following information SBAR and Kardex.

## 2018-09-17 NOTE — PROGRESS NOTES
[facility]  
Senior Resident Admission Note CC:Elevated liver enzymes HPI: 
Suzi Saldivar is a 62 y.o. female with NIDDM type II, sarcoidosis , GERD who presents to the ER as a referral from [facility] complaining of nausea, vomiting,dark colored urine with elevated LFTs after taking keflex. Chart reviewed. 5 days ago, pt reports neck swelling with redness and itching likely due to a bug bite. She was seen at [facility] on 9/13 was prescribed keflex 500 mg tid for cellulitis The next day she began feeling nauseous with headaches and lightheadedness. She continued to take keflex and notice tea colored urine the next day. Today, she went to [facility] today was found to have elevated LFTs in the 300- 500s and was sent here for further evaluation. She reports taking 8 doses of keflex since the 9/13. She reports her symptoms of cellulitis have resolved. Pt denies ETOH use, acetaminophen use, ASA use ,or supplements. She reports only home meds is metformin and omeprazole. In the ED Vitals : 110,160/84, RR: 20, 100% RA Physical Exam 
Neck : one red papule without any surrounding erythema Abdomen : NT, +BS, No masses Lungs CTAB Cardio: tachy , normal S1,S2 Labs :ALT 590s , AST 303s, ,Tbili 2.6 , Cr.0.50,Lipase 100, INR 1.1,PT 11. 6WBC 6.2, Hgbn 13.7,  
 
UA : +bilirubin, trace ketones Images RUQ abdominal US: diffuse fatty infiltrate ,gallbladder distension,no biliary ductal dilatation Abdominal CT scan with contrast : hepatic steatosis. No biliary dilation, No acute findings Patient seen, examined, and discussed with Dr. Keith Blantno (PGY-1). See Dr. Ignacio Rose note for more details. A/P: Mrs. David Duncan is a 61 yo female admitted Acute Hepatitis . Symptoms likely d/t keflex in the setting of hepatic steatosis 1. Acute Hepatitis  
- admit to remote tele - F/U : acetaminophen level,salicyclate level,HIV, acute hepatitis panel, iron and ferritin     ceruloplasmin, ETOH level - Trend CMP's daily  
- IVF  
- EKG - Strict I/Os 
- Consult GI  
- Hold omeprazole as it can contribute liver failure as well - Discontinue keflex,no abx needed as signs of cellulitis has markedly improved Please refer to Dr. Bo Bautista note for full details. I agree with remaining assessment and plan as documented in Dr. Terrie Ríos note. Pt discussed with Georgia Urrutia (on-call attending physician) Martina Kennedy MD 
Family Medicine Resident

## 2018-09-17 NOTE — PROGRESS NOTES
CM Note: 
Reason for Admission:   transaminitis RRAT Score:    7 Plan for utilizing home health:    none Likelihood of Readmission:  low Transition of Care Plan:                   
Pt to d/c to home. Her , Tee Dudley ([phone]), to transport her. Pt has Rx coverage and gets her medications from [facility] at [place]. DIANE Lopez Care Management Interventions PCP Verified by CM: Yes (Dr. John John.  NN notified.) Palliative Care Criteria Met (RRAT>21 & CHF Dx)?: No 
MyChart Signup: No 
Discharge Durable Medical Equipment: No 
Physical Therapy Consult: Yes Occupational Therapy Consult: Yes Speech Therapy Consult: No 
Current Support Network: Lives with Spouse, Own Home (Lives with  and dtr in a 2 story house; 13 interior steps and 5-6 entry steps.) Confirm Follow Up Transport: Family () Plan discussed with Pt/Family/Caregiver: Yes Discharge Location Discharge Placement: Home with two level

## 2018-09-17 NOTE — PROGRESS NOTES
[facility]: MED RECONCILIATION Information obtained from: Patient/ Rx Query Allergies: Latex; Augmentin [amoxicillin-pot clavulanate]; and Keflex [cephalexin] Prior to Admission Medications:  
 
Medication Documentation Review Audit Reviewed by Shante Carmona PHARMD (Pharmacist) on 09/16/18 at 2018 Medication Sig Documenting Provider Last Dose Status Taking?  
 
 metFORMIN ER (GLUCOPHAGE XR) 500 mg tablet Take 1 Tab by mouth two (2) times daily (with meals). Farooq Anders,  9/16/2018 am Active Yes  
 omeprazole (PRILOSEC) 20 mg capsule Take 20 mg by mouth daily. Historical Provider 9/16/2018 Unknown time Active Yes Shante Carmona PHARMD   Contact: [phone]

## 2018-09-17 NOTE — PROGRESS NOTES
Physical Therapy: 
Spoke with patient, patient has been up ad benoit. Patient currently without any balance or ambulation difficulties. Spoke with OT who evaluated the patient earlier, and no deficits noted. Patient is currently at her baseline, she will not require any additional PT services while in the hospital or at discharge.   
Alannah Salcido PT,DPT,NCS

## 2018-09-17 NOTE — PROGRESS NOTES
Primary Nurse Antonio Nielsen RN and Efrain Navarrete RN performed a dual skin assessment on this patient No impairment noted Anish score is 23

## 2018-09-17 NOTE — PROGRESS NOTES
Bedside and Verbal shift change report given to [address] [place] [address] (oncoming nurse) by Joaquina Mckeon (offgoing nurse). Report included the following information SBAR, Kardex, Procedure Summary, Intake/Output, MAR, Recent Results and Med Rec Status.

## 2018-09-17 NOTE — H&P
[address] [facility]  
Admission H&P Date of admission: 9/16/2018 Patient name: Saniya Perez MRN: [id number] YOB: 1960 Age: 62 y.o. Primary care provider:  Tex Vargas DO Source of Information: patient, medical records Chief complaint:  Elevated liver enzymes History of Present Illness Saniya Perez is a 62 y.o. female who presents to the ER for elevated liver enzymes from urgent care center. Patient reports that on Tuesday 9/11 she noticed what appeared to be a bug bite on her neck. It was red and pruritic. By Thursday 9/13, she reports having as welling on her neck in the same area so she went to [facility]. It was thought that she had cellulitis, so she was started on keflex. Patient reports that on the night of Friday 9/14 she started having nausea without vomiting, some lightheadedness, and noticed dark, tea colored, urine. Today she went back to [facility] and she was told she had elevated liver enzymes from her previous visit and was told to go to the ED. She denies any Fever, chills, CP, palpitations, SOB. She does report having some abdominal bloating and burping transiently on Thursday but denies any abdominal pain or diarrhea. Patient denies any use of herbal or complementary/alternative medications. Denies any recent travel. In the ER, vital signs were remarkable for T 98.1, , /83, RR 20, SPO2 99% on RA. Labs were remarkable for , CMP with Cr 0.50, BUN 4, Glu 143. UA showed trace ketones. Abdominal ultrasound  showed diffuse fatty liver, no biliary ductal dilation, gallbladder distension without cholecystitis or cholelithiasis. CT abdomen/pelvis showed hepatic steatosis. Pt was treated with 1L NS bolus. Home Medications Prior to Admission medications Medication Sig Start Date End Date Taking? Authorizing Provider  
omeprazole (PRILOSEC) 20 mg capsule Take 20 mg by mouth daily.    Yes Historical Provider  
metFORMIN ER (GLUCOPHAGE XR) 500 mg tablet Take 1 Tab by mouth two (2) times daily (with meals). 4/20/18  Yes Farooq Arambulat, DO Allergies Allergies Allergen Reactions  Latex Itching  
  inflammation  Augmentin [Amoxicillin-Pot Clavulanate] Hives  Keflex [Cephalexin] Other (comments) Elevated LFT's  
 
 
Past Medical History:  
Diagnosis Date  Diabetes ([address]  Neurological disorder   
 migraines  Sarcoidosis  Stickler's syndrome   
 daughter has-pt may have (genetic connective tissue disorder) Past Surgical History:  
Procedure Laterality Date  HX ORTHOPAEDIC Right   
 wrist  
 
 
Family History Problem Relation Age of Onset  Breast Cancer Sister Social History Patient resides 
x  Independently With family care [facility]isted living SNF Ambulates 
x  Independently With cane Assisted walker Alcohol history  
x  Rarely Social  
  Chronic Smoking history 
x  None Former smoker Current smoker History Smoking Status  Former Smoker Smokeless Tobacco  
 Never Used Drug history 
x  None Former drug user Current drug user Sexual history 
x  Sexually active Not sexually active Code status 
x  Full code DNR/DNI Partial   
Code status discussed with the patient/caregivers. Review of Systems The following are negative unless bolded. General Fever, chills, fatigue, and unexpected weight change. HENT Ear pain, sinus pressure and sore throat. Eyes Visual disturbance. Respiratory Cough, chest tightness, shortness of breath and wheezing. Cardio Chest pain, palpitations and leg swelling. GI Nausea, vomiting, abd pain, melanic diarrhea, constipation.  Dysuria. Extremities Myalgias and arthralgias. Skin Color change and pallor. Neuro Weakness and headaches. Behavioral Confusion, nervous, anxious.    
 
Physical Exam 
 Visit Vitals  /90  Pulse (!) 110  Temp 98.1 °F (36.7 °C)  Resp 20  
 Ht 5' 3\" (1.6 m)  Wt 138 lb (62.6 kg)  SpO2 100%  BMI 24.45 kg/m2 General: No acute distress. Alert. Cooperative. Head: Normocephalic. Atraumatic. Eyes:  Conjunctiva pink. Sclera white. PERRL. Ears:  Ear canals patent. TM non-erythematous. Nose:  Septum midline. Mucosa pink. No drainage. Throat: Mucosa pink. Moist mucous membranes. No tonsillar exudates or erythema. Palate movement equal bilaterally. Neck: Supple. Normal ROM. No stiffness. Respiratory: CTAB. No w/r/r/c.  
Cardiovascular: Tachycardic, regular rhythm. Normal S1,S2. No m/r/g. Pulses 2+ throughout. GI: + bowel sounds. Nontender. No rebound tenderness or guarding. Nondistended. Extremities: No edema. No palpable cord. No tenderness. Musculoskeletal: Full ROM in all extremities. Neuro: CN II-XII grossly intact. Strength 5/5 in all extremities. Sensation intact in all extremities. DTRs 2+ throughout. Skin: Clear. No rashes. No ulcers. : Deferred Rectal: Deferred Laboratory Data Recent Results (from the past 24 hour(s)) SAMPLES BEING HELD Collection Time: 09/16/18  4:46 PM  
Result Value Ref Range SAMPLES BEING HELD sst   
 COMMENT Add-on orders for these samples will be processed based on acceptable specimen integrity and analyte stability, which may vary by analyte. LIPASE Collection Time: 09/16/18  4:46 PM  
Result Value Ref Range Lipase 168 73 - 393 U/L  
CBC WITH AUTOMATED DIFF Collection Time: 09/16/18  4:46 PM  
Result Value Ref Range WBC 6.2 3.6 - 11.0 K/uL  
 RBC 4.85 3.80 - 5.20 M/uL  
 HGB 13.7 11.5 - 16.0 g/dL HCT 41.5 35.0 - 47.0 % MCV 85.6 80.0 - 99.0 FL  
 MCH 28.2 26.0 - 34.0 PG  
 MCHC 33.0 30.0 - 36.5 g/dL  
 RDW 13.2 11.5 - 14.5 % PLATELET 167 794 - 520 K/uL MPV 9.7 8.9 - 12.9 FL  
 NRBC 0.0 0  WBC ABSOLUTE NRBC 0.00 0.00 - 0.01 K/uL NEUTROPHILS 81 (H) 32 - 75 % LYMPHOCYTES 11 (L) 12 - 49 % MONOCYTES 6 5 - 13 % EOSINOPHILS 2 0 - 7 % BASOPHILS 0 0 - 1 % IMMATURE GRANULOCYTES 0 0.0 - 0.5 % ABS. NEUTROPHILS 5.0 1.8 - 8.0 K/UL  
 ABS. LYMPHOCYTES 0.7 (L) 0.8 - 3.5 K/UL  
 ABS. MONOCYTES 0.4 0.0 - 1.0 K/UL  
 ABS. EOSINOPHILS 0.1 0.0 - 0.4 K/UL  
 ABS. BASOPHILS 0.0 0.0 - 0.1 K/UL  
 ABS. IMM. GRANS. 0.0 0.00 - 0.04 K/UL  
 DF SMEAR SCANNED    
 RBC COMMENTS NORMOCYTIC, NORMOCHROMIC METABOLIC PANEL, COMPREHENSIVE Collection Time: 09/16/18  4:46 PM  
Result Value Ref Range Sodium 138 136 - 145 mmol/L Potassium 4.0 3.5 - 5.1 mmol/L Chloride 103 97 - 108 mmol/L  
 CO2 24 21 - 32 mmol/L Anion gap 11 5 - 15 mmol/L Glucose 143 (H) 65 - 100 mg/dL BUN 4 (L) 6 - 20 MG/DL Creatinine 0.50 (L) 0.55 - 1.02 MG/DL  
 BUN/Creatinine ratio 8 (L) 12 - 20 GFR est AA >60 >60 ml/min/1.73m2 GFR est non-AA >60 >60 ml/min/1.73m2 Calcium 9.4 8.5 - 10.1 MG/DL Bilirubin, total 2.6 (H) 0.2 - 1.0 MG/DL  
 ALT (SGPT) 590 (H) 12 - 78 U/L  
 AST (SGOT) 303 (H) 15 - 37 U/L Alk. phosphatase 382 (H) 45 - 117 U/L Protein, total 7.8 6.4 - 8.2 g/dL Albumin 3.8 3.5 - 5.0 g/dL Globulin 4.0 2.0 - 4.0 g/dL A-G Ratio 1.0 (L) 1.1 - 2.2 URINALYSIS W/MICROSCOPIC Collection Time: 09/16/18  5:25 PM  
Result Value Ref Range Color YELLOW/STRAW Appearance CLEAR CLEAR Specific gravity 1.010 1.003 - 1.030    
 pH (UA) 6.0 5.0 - 8.0 Protein NEGATIVE  NEG mg/dL Glucose NEGATIVE  NEG mg/dL Ketone TRACE (A) NEG mg/dL Blood NEGATIVE  NEG Urobilinogen 1.0 0.2 - 1.0 EU/dL Nitrites NEGATIVE  NEG Leukocyte Esterase NEGATIVE  NEG    
 WBC 0-4 0 - 4 /hpf  
 RBC 0-5 0 - 5 /hpf Epithelial cells FEW FEW /lpf Bacteria NEGATIVE  NEG /hpf Hyaline cast 0-2 0 - 5 /lpf URINE CULTURE HOLD SAMPLE Collection Time: 09/16/18  5:25 PM  
Result Value Ref Range Urine culture hold URINE ON HOLD IN MICROBIOLOGY DEPT FOR 3 DAYS. IF UNPRESERVED URINE IS SUBMITTED, IT CANNOT BE USED FOR ADDITIONAL TESTING AFTER 24 HRS, RECOLLECTION WILL BE REQUIRED. BILIRUBIN, CONFIRM Collection Time: 09/16/18  5:25 PM  
Result Value Ref Range Bilirubin UA, confirm POSITIVE (A) NEG PROTHROMBIN TIME + INR Collection Time: 09/16/18  7:15 PM  
Result Value Ref Range INR 1.1 0.9 - 1.1 Prothrombin time 11.6 (H) 9.0 - 11.1 sec Imaging:  
 
Abdominal Ultrasound: FINDINGS: Limited right upper quadrant ultrasound was performed. The liver is 
increased in echogenicity. The common bile duct is normal measuring 4 mm in 
diameter. The gallbladder is distended. There is no evidence of gallstone. Sonographic Lajoyce Baba sign is absent. There is no gallbladder wall thickening or 
pericholecystic fluid. The right kidney measures 10.9 cm in length. There is no 
hydronephrosis or visible ascites. 
  
IMPRESSION IMPRESSION:  
  
1. Diffuse fatty infiltration of the liver. 2. No biliary ductal dilatation. 3. Gallbladder distention. No cholecystitis or cholelithiasis. CT abdomen/pelvis:  
 
FINDINGS:  
There is no inflammation, ascites, pneumoperitoneum or significant adenopathy.  
  
Liver is fatty without focal lesion. Bile ducts are not enlarged. Gallbladder is 
unremarkable by CT. Pancreas shows no mass or inflammation. Spleen is 
unremarkable. Adrenal glands are normal in size. Kidneys show no mass or 
hydronephrosis. Aorta is without aneurysm.  
  
The appendix is normal. Bowels are not dilated. The bladder is not distended. The distal ureters are not dilated. There is no apparent pelvic mass. 
  
IMPRESSION IMPRESSION: Hepatic steatosis. No biliary dilation. No acute finding. EKG:  Ordered Assessment and Plan Lynell Alpers is a 62 y.o. female who is admitted for Transaminitis.  
 
Transaminitis - Patient with no hx of alcohol abuse presents with elevated LFTs from urgent care clinic. LFTs on arrival showed , , Alk Phos 382, Total bilirubin 2.6. LFT were WNL previously on 4/2018. Abdominal ultrasound at [place] showed fatty liver, confirmed by CT abdomen which showed hepatic steatosis. Differential includes acute viral hepatitis, drug induced hepatitis, and alcoholic fatty liver. Keflex is also known to cause hepatitis and elevated LFTs. Patient reports taking 8 doses total since being given prescription. Will also need to consider Ross's disease, hemachromatosis, and HIV as potential underlying causes.  
-admit to remote telemetry 
-vitals per unit routine  
-Hepatic panel  
-alcohol level 
-salicylate and acetaminophen level  
-iron profile  
-ceruloplasmin  
-will hold keflex given it may be potential etiology of elevated LFTs Tachycardia - etiology unclear,  at [place] but patient asymptomatic  
-will obtain EKG Sarcoidosis- Diagnosed in 1991. Patient reports not seeing a specialist for it in >20yrs. Reports previously being on steroids in 1993-94 and also used albuterol inhaler as needed.  
-will advise to follow up with pulmonologist outpatient GERD - stable; patient takes omeprazole daily at home 
-will hold omeprazole due to risk of hepatic failure, hepatic necrosis, and hepatic encephalopathy Stickler's syndrome - stable, patient takes no medications for this syndrome FEN/GI - IVF at 100 mL/hr. Activity - as tolerated DVT prophylaxis - lovenox GI prophylaxis -  none Disposition - Plan to d/c to Home. CODE STATUS:  Full Patient discussed with Dr. Frandy Vences MD 
[facility] Medicine Resident Hospital Problems Hospital Problems  Date Reviewed: 4/22/2018 Codes Class Noted POA Transaminitis ICD-10-CM: R74.0 ICD-9-CM: 790.4  9/16/2018 Unknown

## 2018-09-17 NOTE — PROGRESS NOTES
[facility]  Office [phone], Fax [phone] Family Medicine Daily Progress Note: 9/17/2018 Assessment/Plan:  
 Ludwig Oro is a 62 y.o. female who is admitted for Transaminitis. 
  
Transaminitis - Patient with no hx of alcohol abuse presents with elevated LFTs from urgent care clinic. LFTs on arrival showed , , Alk Phos 382, Total bilirubin 2.6. LFT were WNL previously on 4/2018. Abdominal ultrasound at [facility] showed fatty liver, confirmed by CT abdomen which showed hepatic steatosis. Differential includes acute viral hepatitis, drug induced hepatitis, and alcoholic fatty liver. Keflex is also known to cause hepatitis and elevated LFTs. Patient reports taking 8 doses total since being given prescription. Will also need to consider Ross's disease, hemachromatosis, and HIV as potential underlying causes.  
-admit to remote telemetry 
-vitals per unit routine  
-Hepatic panel pending 
-alcohol level, salicylate, and acetaminophen level WNL -iron profile pending 
-ceruloplasmin pending 
-will hold keflex given it may be potential etiology of elevated LFTs   
  
Tachycardia -  at [facility] but patient asymptomatic. Patient reports hx of tachycardia at rest since she was a child. - EKG at [facility] showed sinus tachycardia  
  
Sarcoidosis- Diagnosed in 1991. Patient reports not seeing a specialist for it in >20yrs. Reports previously being on steroids in 1993-94 and also used albuterol inhaler as needed.  
-will advise to follow up with pulmonologist outpatient 
  
GERD - stable; patient takes omeprazole daily at home 
-will hold omeprazole due to risk of hepatic failure, hepatic necrosis, and hepatic encephalopathy 
  
Stickler's syndrome - stable, patient takes no medications for this syndrome 
  
FEN/GI - IVF at 100 mL/hr. Activity - as tolerated DVT prophylaxis - lovenox GI prophylaxis -  none Disposition - Plan to d/c to Home.  
  
 CODE STATUS:  Full Subjective:  
 
Patient denies any abdominal pain, n/v/d/c or bloating sensation. Denies any fever, chills. Review of Systems:  
Review of Systems: 
Constitutional: denies fever, chills CV: denies CP or palpitations Resp: Denies SOB, wheezing GI: denies abd pain, n/v/d/c, or bloody stool Objective:  
 
Physical examination Patient Vitals for the past 12 hrs: 
 BP Temp Pulse Resp SpO2  
18 0351 134/75 98.5 °F (36.9 °C) 87 17 98 % 18 2336 149/79 98.3 °F (36.8 °C) 93 20 100 % 18 - - - - 100 % 18 149/90 - - - 100 % 18 145/83 - - - 97 % 18 129/85 - - - 98 % 18 191 129/74 - - - 98 % 18 1900 150/89 - - - 98 % Temp (24hrs), Av.3 °F (36.8 °C), Min:98.1 °F (36.7 °C), Max:98.5 °F (36.9 °C) No intake or output data in the 24 hours ending 18 0631 O2 Device: Room air General:   Alert, cooperative, no acute distress Head:   Atraumatic Eyes:   Conjunctivae clear ENT:  Oral mucosa normal  
Neck:  Supple, trachea midline, no adenopathy No JVD Chest wall:    No tenderness or deformities Lungs:   Clear to auscultation bilaterally Heart:   Tachycardic, Regular rhythm, no murmur Abdomen:    Soft, non-tender No masses or organomegaly Extremities:  No edema or DVT signs Pulses:  Symmetric all extremities Skin:  Warm and dry No rashes or lesions Neurologic:  Oriented No focal deficits Psychiatric:  normal mood and affect Data Review:  
 
 
Recent Labs  
   18 
 164 WBC  5.3  6.2 HGB  12.9  13.7 HCT  38.9  41.5 PLT  183  194 Recent Labs  
   18 
 02318 
 1646 NA  140   --   138  
K  3.8   --   4.0  
CL  104   --   103 CO2  29   --   24  
GLU  98   --   143* BUN  4*   --   4*  
CREA  0.47*   --   0.50* CA  9.2   --   9.4 ALB  3.6   --   3.8 SGOT  214*   --   303* ALT  488*   --   590* INR   --   1.1   -- Medications reviewed Current Facility-Administered Medications Medication Dose Route Frequency  sodium chloride (NS) flush 5-10 mL  5-10 mL IntraVENous Q8H  
 sodium chloride (NS) flush 5-10 mL  5-10 mL IntraVENous PRN  
 enoxaparin (LOVENOX) injection 40 mg  40 mg SubCUTAneous Q24H  pantoprazole (PROTONIX) tablet 40 mg  40 mg Oral DAILY  insulin lispro (HUMALOG) injection   SubCUTAneous AC&HS  
 glucose chewable tablet 16 g  4 Tab Oral PRN  
 dextrose (D50W) injection syrg 12.5-25 g  12.5-25 g IntraVENous PRN  
 glucagon (GLUCAGEN) injection 1 mg  1 mg IntraMUSCular PRN  
 0.9% sodium chloride infusion  100 mL/hr IntraVENous CONTINUOUS Signed: 
 Adele Smith MD 
 Resident, Family Medicine Patient discussed with Dr. Ema Hamman , [facility] Medicine Attending

## 2018-09-17 NOTE — ED NOTES
TRANSFER - OUT REPORT: 
 
Verbal report given to Jaime RN(name) on Maxime Needles  being transferred to 5th Floor(unit) for routine progression of care Report consisted of patients Situation, Background, Assessment and  
Recommendations(SBAR). Information from the following report(s) SBAR, Kardex, ED Summary, MAR, Accordion and Recent Results was reviewed with the receiving nurse. Lines:  
Peripheral IV 09/16/18 Left Antecubital (Active) Site Assessment Clean, dry, & intact 9/16/2018  4:45 PM  
Phlebitis Assessment 0 9/16/2018  4:45 PM  
Infiltration Assessment 0 9/16/2018  4:45 PM  
Dressing Status Clean, dry, & intact 9/16/2018  4:45 PM  
Dressing Type Transparent 9/16/2018  4:45 PM  
Hub Color/Line Status Pink 9/16/2018  4:45 PM  
  
 
Opportunity for questions and clarification was provided. Patient transported with: 
 Monitor Registered Nurse

## 2018-09-17 NOTE — PROGRESS NOTES
Occupational Therapy EVALUATION/discharge Patient: Bridgette Gilmore (35 y.o. female) Date: 9/17/2018 Primary Diagnosis: Transaminitis Precautions: fall ASSESSMENT:  
Based on the objective data described below, the patient presents with hospital admission secondary to transaminitis. Patient received in bed, pleasant and agreeable to OT . Patient reports OOB as needed in room, less nauseous today but notes nausea still present. Patient reports has been able to manage ADL tasks at home and while here in hospital. Patient able to demonstrate activity within room, without physical assistance. Will complete current order as further skilled acute occupational therapy is not indicated at this time. Discharge Recommendations: None Further Equipment Recommendations for Discharge: none SUBJECTIVE:  
Patient stated Its not the birthday plans I wanted .  OBJECTIVE DATA SUMMARY:  
HISTORY:  
Past Medical History:  
Diagnosis Date  Diabetes ([facility] [address]  Neurological disorder   
 migraines  Sarcoidosis  Stickler's syndrome   
 daughter has-pt may have (genetic connective tissue disorder) Past Surgical History:  
Procedure Laterality Date  HX ORTHOPAEDIC Right   
 wrist  
 
 
Prior Level of Function/Environment/Context: independent Occupations in which the patient is/was successful, what are the barriers preventing that success:  
Performance Patterns (routines, roles, habits, and rituals):  
Personal Interests and/or values:  
Expanded or extensive additional review of patient history:  
 
Home Situation Home Environment: Private residence # Steps to Enter: 6 Rails to Enter: Yes Hand Rails : Bilateral 
One/Two Story Residence: Two story Living Alone: No 
Support Systems: Spouse/Significant Other/Partner, Child(sheron), Friends \ neighbors, [employer] community Patient Expects to be Discharged to[de-identified] Private residence Current DME Used/Available at Home: Blood pressure cuff, Glucometer Tub or Shower Type: Tub/Shower combination Hand dominance: Right EXAMINATION OF PERFORMANCE DEFICITS: 
Cognitive/Behavioral Status: 
Neurologic State: Alert Orientation Level: Oriented X4 Cognition: Follows commands Perception: Appears intact Perseveration: No perseveration noted Safety/Judgement: Awareness of environment Skin: intact as seen Edema: none noted Hearing: Auditory Auditory Impairment: None Vision/Perceptual:   
    
    
    
  
    
    
Corrective Lenses: Glasses Range of Motion: 
AROM: Within functional limits Strength: 
Strength: Within functional limits Coordination: 
Coordination: Within functional limits Fine Motor Skills-Upper: Left Intact; Right Intact Gross Motor Skills-Upper: Left Intact; Right Intact Tone & Sensation: 
Tone: Normal 
Sensation: Intact Balance: 
Sitting: Intact Standing: Intact Functional Mobility and Transfers for ADLs: 
Bed Mobility: 
Supine to Sit: Independent Sit to Supine: Independent Scooting: Independent Transfers: 
Sit to Stand: Independent Stand to Sit: Independent Toilet Transfer : Independent Shower Transfer: Independent ADL Assessment: 
Feeding: Independent Oral Facial Hygiene/Grooming: Independent Bathing: Independent Upper Body Dressing: Independent Lower Body Dressing: Independent Toileting: Independent ADL Intervention and task modifications: 
  
 
  
 
  
 
  
 
  
 
  
 
  
 
Cognitive Retraining Safety/Judgement: Awareness of environment Therapeutic Exercise: 
  
Functional Measure: 
Barthel Index: 
 
Bathin Bladder: 10 Bowels: 10 
Groomin Dressing: 10 Feeding: 10 Mobility: 15 
Stairs: 0 Toilet Use: 10 Transfer (Bed to Chair and Back): 15 Total: 90 Barthel and G-code impairment scale: 
Percentage of impairment CH 
0% CI 
 1-19% CJ 
20-39% CK 
40-59% CL 
60-79% CM 
80-99% CN 
100% Barthel Score 0-100 100 99-80 79-60 59-40 20-39 1-19 
 0 Barthel Score 0-20 20 17-19 13-16 9-12 5-8 1-4 0 The Barthel ADL Index: Guidelines 1. The index should be used as a record of what a patient does, not as a record of what a patient could do. 2. The main aim is to establish degree of independence from any help, physical or verbal, however minor and for whatever reason. 3. The need for supervision renders the patient not independent. 4. A patient's performance should be established using the best available evidence. Asking the patient, friends/relatives and nurses are the usual sources, but direct observation and common sense are also important. However direct testing is not needed. 5. Usually the patient's performance over the preceding 24-48 hours is important, but occasionally longer periods will be relevant. 6. Middle categories imply that the patient supplies over 50 per cent of the effort. 7. Use of aids to be independent is allowed. Simone Estrada., Barthel, D.W. (1671). Functional evaluation: the Barthel Index. [address] [facility] (14)2. Cecilia Frey grace CHANDLER Chun, Bryon Conn., Mary Mckeon., [place], [address] [place] [address] (1999). Measuring the change indisability after inpatient rehabilitation; comparison of the responsiveness of the Barthel Index and Functional [place] Measure. Journal of Neurology, Neurosurgery, and Psychiatry, 66(4), 299-566. Rosanna Sr, [place].ANNE, MAUREEN Peoples, & Julee Liang MStephanieA. (2004.) Assessment of post-stroke quality of life in cost-effectiveness studies: The usefulness of the Barthel Index and the EuroQoL-5D. [facility], 13, 499-33 G codes: In compliance with CMSs Claims Based Outcome Reporting, the following G-code set was chosen for this patient based on their primary functional limitation being treated:  
 
The outcome measure chosen to determine the severity of the functional limitation was the Barthel index  with a score of 90/100 which was correlated with the impairment scale. ? Self Care:  
  - CURRENT STATUS: CI - 1%-19% impaired, limited or restricted  - GOAL STATUS: CI - 1%-19% impaired, limited or restricted  - D/C STATUS:  CI - 1%-19% impaired, limited or restricted Occupational Therapy Evaluation Charge Determination History Examination Decision-Making LOW Complexity : Brief history review  LOW Complexity : 1-3 performance deficits relating to physical, cognitive , or psychosocial skils that result in activity limitations and / or participation restrictions  LOW Complexity : No comorbidities that affect functional and no verbal or physical assistance needed to complete eval tasks Based on the above components, the patient evaluation is determined to be of the following complexity level: LOW Pain: 
  
  
  
  
  
  
Activity Tolerance: VSS Please refer to the flowsheet for vital signs taken during this treatment. After treatment:  
[]  Patient left in no apparent distress sitting up in chair 
[x]  Patient left in no apparent distress in bed 
[x]  Call bell left within reach [x]  Nursing notified 
[]  Caregiver present 
[]  Bed alarm activated COMMUNICATION/EDUCATION:  
Communication/Collaboration: 
[x]      Home safety education was provided and the patient/caregiver indicated understanding. [x]      Patient/family have participated as able and agree with findings and recommendations. []      Patient is unable to participate in plan of care at this time. Findings and recommendations were discussed with: Physical Therapist and Registered Nurse KYAA Donahue/L Time Calculation: 24 mins

## 2018-09-17 NOTE — CONSULTS
Gastroenterology Consultation Note      Admit Date: 9/16/2018  Consult Date: 9/17/2018   I greatly appreciate your asking me to see Dary Herman, thank you very much for the opportunity to participate in her care. Narrative Assessment and Plan   · Abnormal liver enzymes - consistent with hepatocellular process  · Hyperbilirubinemia    Per history I suspect this is medication induced hepatitis. However, in the past patient has had isolated elevation in ALP so consideration for underlying PSC or PBC. Plan  - stop offending agent (in this case suspected Keflex)  - trend liver enzymes  - check additional liver serologies  - following  -------------------------------  Torito Leiva. Doris Sandhu MD  [phone] office  [phone] voicemail   I have personally reviewed the history and independently examined the patient. I have reviewed the chart and agree with the documentation recorded by the Mid Level Provider, including the assessment, treatment plan, and disposition. ASSESSMENT AND PLAN:  No symptoms or exposures to help guide differential diagnosis, which is broad but drug induced liver injury always deserves a top spot. We'll see how she does with withholding keflex and the results of her screening serologic testing. Ultrasound and CT fail to reveal a structural explanation for her lab abnormalities. Following. Imelda Castelan MD        Subjective:     Chief Complaint: abnormal liver enzymes    History of Present Illness: Patient is a 62 y.o. female with a history of diabetes and and sarcoidosis admitted on 9/16/2018 with Transaminitis. We have been asked to evaluate patient for elevated LFTs. Patient reports fatigue, headache, tea colored urine and severe nausea. Denies any abdominal pain, vomiting, change in bowel habits, bleeding, or weight loss. Patient denies heavy ETOH or intravenous drug abuse. She was recent started on Keflex for cellulitis.  She states that after ~4 days in with taking the medication she started to experience the above mentioned symptoms. Patient denies any known history of chronic liver disease. PCP:  Magan Romero DO    Past Medical History:   Diagnosis Date    Diabetes ([address]     Neurological disorder     migraines    Sarcoidosis     Stickler's syndrome     daughter has-pt may have (genetic connective tissue disorder)        Past Surgical History:   Procedure Laterality Date    HX ORTHOPAEDIC Right     wrist       Social History   Substance Use Topics    Smoking status: Former Smoker    Smokeless tobacco: Never Used    Alcohol use No        Family History   Problem Relation Age of Onset    Breast Cancer Sister         Allergies   Allergen Reactions    Latex Itching     inflammation    Augmentin [Amoxicillin-Pot Clavulanate] Hives    Keflex [Cephalexin] Other (comments)     Elevated LFT's            Home Medications:  Prior to Admission Medications   Prescriptions Last Dose Informant Patient Reported? Taking?   metFORMIN ER (GLUCOPHAGE XR) 500 mg tablet 9/16/2018 at am  No Yes   Sig: Take 1 Tab by mouth two (2) times daily (with meals). omeprazole (PRILOSEC) 20 mg capsule 9/16/2018 at Unknown time  Yes Yes   Sig: Take 20 mg by mouth daily.       Facility-Administered Medications: None       Hospital Medications:  Current Facility-Administered Medications   Medication Dose Route Frequency    famotidine (PEPCID) tablet 20 mg  20 mg Oral DAILY    sodium chloride (NS) flush 5-10 mL  5-10 mL IntraVENous Q8H    sodium chloride (NS) flush 5-10 mL  5-10 mL IntraVENous PRN    enoxaparin (LOVENOX) injection 40 mg  40 mg SubCUTAneous Q24H    pantoprazole (PROTONIX) tablet 40 mg  40 mg Oral DAILY    insulin lispro (HUMALOG) injection   SubCUTAneous AC&HS    glucose chewable tablet 16 g  4 Tab Oral PRN    dextrose (D50W) injection syrg 12.5-25 g  12.5-25 g IntraVENous PRN    glucagon (GLUCAGEN) injection 1 mg  1 mg IntraMUSCular PRN    0.9% sodium chloride infusion  100 mL/hr IntraVENous CONTINUOUS       Review of Systems: Admission ROS by Priti Andesr DO from 9/16/2018 were reviewed with the patient and changes (other than per HPI) include: none      Objective:     Physical Exam:  Visit Vitals    /84 (BP 1 Location: Right arm, BP Patient Position: At rest)    Pulse 78    Temp 97.9 °F (36.6 °C)    Resp 18    Ht 5' 3\" (1.6 m)    Wt 62.6 kg (138 lb)    SpO2 99%    Breastfeeding No    BMI 24.45 kg/m2     SpO2 Readings from Last 6 Encounters:   09/17/18 99%   04/20/18 100%   12/11/17 93%   12/05/17 96%   11/24/17 97%   11/11/14 95%          Intake/Output Summary (Last 24 hours) at 09/17/18 1521  Last data filed at 09/17/18 1348   Gross per 24 hour   Intake             1080 ml   Output             1400 ml   Net             -320 ml      General: no distress, comfortable  Skin:  No rash or jaundice  HEENT: Pupils equal, sclera anicteric, oropharynx with no gross lesions  Cardiovascular: No abnormal audible heart sounds, well perfused, no edema  Respiratory:  No abnormal audible breath sounds, normal respiratory effort, no throacic deformity  GI:  Bowel sounds present, soft, nondistended, nontender  Musculoskeletal:  No skeletal deformity nor acute arthritis noted. Neurological:  Motor and sensory function intact in upper extremeties  Psychiatric:  Normal affect, memory intact, appears to have insight into current illness    Laboratory:    Recent Results (from the past 24 hour(s))   SAMPLES BEING HELD    Collection Time: 09/16/18  4:46 PM   Result Value Ref Range    SAMPLES BEING HELD sst     COMMENT        Add-on orders for these samples will be processed based on acceptable specimen integrity and analyte stability, which may vary by analyte.    LIPASE    Collection Time: 09/16/18  4:46 PM   Result Value Ref Range    Lipase 168 73 - 393 U/L   CBC WITH AUTOMATED DIFF    Collection Time: 09/16/18  4:46 PM   Result Value Ref Range    WBC 6.2 3.6 - 11.0 K/uL    RBC 4.85 3.80 - 5.20 M/uL    HGB 13.7 11.5 - 16.0 g/dL    HCT 41.5 35.0 - 47.0 %    MCV 85.6 80.0 - 99.0 FL    MCH 28.2 26.0 - 34.0 PG    MCHC 33.0 30.0 - 36.5 g/dL    RDW 13.2 11.5 - 14.5 %    PLATELET 997 949 - 914 K/uL    MPV 9.7 8.9 - 12.9 FL    NRBC 0.0 0  WBC    ABSOLUTE NRBC 0.00 0.00 - 0.01 K/uL    NEUTROPHILS 81 (H) 32 - 75 %    LYMPHOCYTES 11 (L) 12 - 49 %    MONOCYTES 6 5 - 13 %    EOSINOPHILS 2 0 - 7 %    BASOPHILS 0 0 - 1 %    IMMATURE GRANULOCYTES 0 0.0 - 0.5 %    ABS. NEUTROPHILS 5.0 1.8 - 8.0 K/UL    ABS. LYMPHOCYTES 0.7 (L) 0.8 - 3.5 K/UL    ABS. MONOCYTES 0.4 0.0 - 1.0 K/UL    ABS. EOSINOPHILS 0.1 0.0 - 0.4 K/UL    ABS. BASOPHILS 0.0 0.0 - 0.1 K/UL    ABS. IMM. GRANS. 0.0 0.00 - 0.04 K/UL    DF SMEAR SCANNED      RBC COMMENTS NORMOCYTIC, NORMOCHROMIC     METABOLIC PANEL, COMPREHENSIVE    Collection Time: 09/16/18  4:46 PM   Result Value Ref Range    Sodium 138 136 - 145 mmol/L    Potassium 4.0 3.5 - 5.1 mmol/L    Chloride 103 97 - 108 mmol/L    CO2 24 21 - 32 mmol/L    Anion gap 11 5 - 15 mmol/L    Glucose 143 (H) 65 - 100 mg/dL    BUN 4 (L) 6 - 20 MG/DL    Creatinine 0.50 (L) 0.55 - 1.02 MG/DL    BUN/Creatinine ratio 8 (L) 12 - 20      GFR est AA >60 >60 ml/min/1.73m2    GFR est non-AA >60 >60 ml/min/1.73m2    Calcium 9.4 8.5 - 10.1 MG/DL    Bilirubin, total 2.6 (H) 0.2 - 1.0 MG/DL    ALT (SGPT) 590 (H) 12 - 78 U/L    AST (SGOT) 303 (H) 15 - 37 U/L    Alk.  phosphatase 382 (H) 45 - 117 U/L    Protein, total 7.8 6.4 - 8.2 g/dL    Albumin 3.8 3.5 - 5.0 g/dL    Globulin 4.0 2.0 - 4.0 g/dL    A-G Ratio 1.0 (L) 1.1 - 2.2     URINALYSIS W/MICROSCOPIC    Collection Time: 09/16/18  5:25 PM   Result Value Ref Range    Color YELLOW/STRAW      Appearance CLEAR CLEAR      Specific gravity 1.010 1.003 - 1.030      pH (UA) 6.0 5.0 - 8.0      Protein NEGATIVE  NEG mg/dL    Glucose NEGATIVE  NEG mg/dL    Ketone TRACE (A) NEG mg/dL    Blood NEGATIVE  NEG      Urobilinogen 1.0 0.2 - 1.0 EU/dL Nitrites NEGATIVE  NEG      Leukocyte Esterase NEGATIVE  NEG      WBC 0-4 0 - 4 /hpf    RBC 0-5 0 - 5 /hpf    Epithelial cells FEW FEW /lpf    Bacteria NEGATIVE  NEG /hpf    Hyaline cast 0-2 0 - 5 /lpf   URINE CULTURE HOLD SAMPLE    Collection Time: 09/16/18  5:25 PM   Result Value Ref Range    Urine culture hold        URINE ON HOLD IN MICROBIOLOGY DEPT FOR 3 DAYS. IF UNPRESERVED URINE IS SUBMITTED, IT CANNOT BE USED FOR ADDITIONAL TESTING AFTER 24 HRS, RECOLLECTION WILL BE REQUIRED. BILIRUBIN, CONFIRM    Collection Time: 09/16/18  5:25 PM   Result Value Ref Range    Bilirubin UA, confirm POSITIVE (A) NEG     PROTHROMBIN TIME + INR    Collection Time: 09/16/18  7:15 PM   Result Value Ref Range    INR 1.1 0.9 - 1.1      Prothrombin time 11.6 (H) 9.0 - 11.1 sec   HEPATITIS PANEL, ACUTE    Collection Time: 09/16/18  8:04 PM   Result Value Ref Range    Hepatitis A, IgM NONREACTIVE NR      __          Hepatitis B surface Ag <0.10 Index    Hep B surface Ag Interp. NEGATIVE  NEG      __          Hepatitis B core, IgM NONREACTIVE NR      __          Hep C  virus Ab Interp.  NONREACTIVE NR      Hep C  virus Ab comment Method used is FilmBreak     GLUCOSE, POC    Collection Time: 09/16/18  9:48 PM   Result Value Ref Range    Glucose (POC) 108 (H) 65 - 100 mg/dL    Performed by Sally Torre    SALICYLATE    Collection Time: 09/16/18 10:57 PM   Result Value Ref Range    Salicylate level <9.4 (L) 2.8 - 20.0 MG/DL   ETHYL ALCOHOL    Collection Time: 09/16/18 10:57 PM   Result Value Ref Range    ALCOHOL(ETHYL),SERUM <10 <10 MG/DL   ACETAMINOPHEN    Collection Time: 09/16/18 10:57 PM   Result Value Ref Range    Acetaminophen level <2 (L) 10 - 30 ug/mL   IRON PROFILE    Collection Time: 09/16/18 10:57 PM   Result Value Ref Range    Iron 52 35 - 150 ug/dL    TIBC 297 250 - 450 ug/dL    Iron % saturation 18 (L) 20 - 50 %   FERRITIN    Collection Time: 09/16/18 10:57 PM   Result Value Ref Range    Ferritin 139 8 - 252 NG/ML HIV 1/2 AG/AB, 4TH GENERATION,W RFLX CONFIRM    Collection Time: 09/16/18 10:57 PM   Result Value Ref Range    HIV 1/2 Interpretation NONREACTIVE NR      HIV 1/2 result comment SEE NOTE     EKG, 12 LEAD, INITIAL    Collection Time: 09/16/18 11:12 PM   Result Value Ref Range    Ventricular Rate 101 BPM    Atrial Rate 101 BPM    P-R Interval 160 ms    QRS Duration 74 ms    Q-T Interval 342 ms    QTC Calculation (Bezet) 443 ms    Calculated P Axis 41 degrees    Calculated R Axis 4 degrees    Calculated T Axis 35 degrees    Diagnosis       Sinus tachycardia  Cannot rule out Inferior infarct (cited on or before 11-NOV-2014)  Abnormal ECG  When compared with ECG of 05-DEC-2017 20:45,  No significant change was found  Confirmed by Marcos Farmer MD. ([id number]) on 9/17/2018 0:59:28 AM     METABOLIC PANEL, COMPREHENSIVE    Collection Time: 09/17/18  2:39 AM   Result Value Ref Range    Sodium 140 136 - 145 mmol/L    Potassium 3.8 3.5 - 5.1 mmol/L    Chloride 104 97 - 108 mmol/L    CO2 29 21 - 32 mmol/L    Anion gap 7 5 - 15 mmol/L    Glucose 98 65 - 100 mg/dL    BUN 4 (L) 6 - 20 MG/DL    Creatinine 0.47 (L) 0.55 - 1.02 MG/DL    BUN/Creatinine ratio 9 (L) 12 - 20      GFR est AA >60 >60 ml/min/1.73m2    GFR est non-AA >60 >60 ml/min/1.73m2    Calcium 9.2 8.5 - 10.1 MG/DL    Bilirubin, total 2.6 (H) 0.2 - 1.0 MG/DL    ALT (SGPT) 488 (H) 12 - 78 U/L    AST (SGOT) 214 (H) 15 - 37 U/L    Alk.  phosphatase 368 (H) 45 - 117 U/L    Protein, total 7.4 6.4 - 8.2 g/dL    Albumin 3.6 3.5 - 5.0 g/dL    Globulin 3.8 2.0 - 4.0 g/dL    A-G Ratio 0.9 (L) 1.1 - 2.2     CBC WITH AUTOMATED DIFF    Collection Time: 09/17/18  2:39 AM   Result Value Ref Range    WBC 5.3 3.6 - 11.0 K/uL    RBC 4.52 3.80 - 5.20 M/uL    HGB 12.9 11.5 - 16.0 g/dL    HCT 38.9 35.0 - 47.0 %    MCV 86.1 80.0 - 99.0 FL    MCH 28.5 26.0 - 34.0 PG    MCHC 33.2 30.0 - 36.5 g/dL    RDW 13.4 11.5 - 14.5 %    PLATELET 075 080 - 415 K/uL    MPV 9.4 8.9 - 12.9 FL    NRBC 0.0 0  WBC    ABSOLUTE NRBC 0.00 0.00 - 0.01 K/uL    NEUTROPHILS 70 32 - 75 %    LYMPHOCYTES 20 12 - 49 %    MONOCYTES 7 5 - 13 %    EOSINOPHILS 3 0 - 7 %    BASOPHILS 1 0 - 1 %    IMMATURE GRANULOCYTES 0 0.0 - 0.5 %    ABS. NEUTROPHILS 3.7 1.8 - 8.0 K/UL    ABS. LYMPHOCYTES 1.0 0.8 - 3.5 K/UL    ABS. MONOCYTES 0.4 0.0 - 1.0 K/UL    ABS. EOSINOPHILS 0.2 0.0 - 0.4 K/UL    ABS. BASOPHILS 0.0 0.0 - 0.1 K/UL    ABS. IMM. GRANS. 0.0 0.00 - 0.04 K/UL    DF AUTOMATED     HEMOGLOBIN A1C WITH EAG    Collection Time: 09/17/18  2:39 AM   Result Value Ref Range    Hemoglobin A1c 5.2 4.2 - 6.3 %    Est. average glucose 103 mg/dL   GLUCOSE, POC    Collection Time: 09/17/18  7:02 AM   Result Value Ref Range    Glucose (POC) 104 (H) 65 - 100 mg/dL    Performed by Vish Dunbar (PCT)    GLUCOSE, POC    Collection Time: 09/17/18 11:40 AM   Result Value Ref Range    Glucose (POC) 121 (H) 65 - 100 mg/dL    Performed by Guevara Nuñez (PCT)          Assessment/Plan:     Principal Problem:    Acute hepatitis (9/16/2018)    Active Problems:    Transaminitis (9/16/2018)         See above narrative for full detail.     Daphne Dunbar PA-C  09/17/18  3:21 PM

## 2018-09-18 VITALS
HEART RATE: 82 BPM | DIASTOLIC BLOOD PRESSURE: 79 MMHG | WEIGHT: 137.35 LBS | HEIGHT: 63 IN | SYSTOLIC BLOOD PRESSURE: 116 MMHG | TEMPERATURE: 98.3 F | BODY MASS INDEX: 24.34 KG/M2 | RESPIRATION RATE: 18 BRPM | OXYGEN SATURATION: 98 %

## 2018-09-18 LAB
ACTIN IGG SERPL-ACNC: 7 UNITS (ref 0–19)
ALBUMIN SERPL-MCNC: 3.4 G/DL (ref 3.5–5)
ALBUMIN/GLOB SERPL: 0.9 {RATIO} (ref 1.1–2.2)
ALP SERPL-CCNC: 329 U/L (ref 45–117)
ALT SERPL-CCNC: 340 U/L (ref 12–78)
AMPHET UR QL SCN: NEGATIVE
ANION GAP SERPL CALC-SCNC: 9 MMOL/L (ref 5–15)
AST SERPL-CCNC: 90 U/L (ref 15–37)
BARBITURATES UR QL SCN: NEGATIVE
BASOPHILS # BLD: 0 K/UL (ref 0–0.1)
BASOPHILS NFR BLD: 1 % (ref 0–1)
BENZODIAZ UR QL: NEGATIVE
BILIRUB SERPL-MCNC: 1.4 MG/DL (ref 0.2–1)
BUN SERPL-MCNC: 7 MG/DL (ref 6–20)
BUN/CREAT SERPL: 16 (ref 12–20)
CALCIUM SERPL-MCNC: 9.4 MG/DL (ref 8.5–10.1)
CANNABINOIDS UR QL SCN: NEGATIVE
CERULOPLASMIN SERPL-MCNC: 26.9 MG/DL (ref 19–39)
CHLORIDE SERPL-SCNC: 105 MMOL/L (ref 97–108)
CO2 SERPL-SCNC: 27 MMOL/L (ref 21–32)
COCAINE UR QL SCN: NEGATIVE
CREAT SERPL-MCNC: 0.45 MG/DL (ref 0.55–1.02)
DIFFERENTIAL METHOD BLD: NORMAL
DRUG SCRN COMMENT,DRGCM: NORMAL
EOSINOPHIL # BLD: 0.4 K/UL (ref 0–0.4)
EOSINOPHIL NFR BLD: 7 % (ref 0–7)
ERYTHROCYTE [DISTWIDTH] IN BLOOD BY AUTOMATED COUNT: 13.2 % (ref 11.5–14.5)
GLOBULIN SER CALC-MCNC: 3.7 G/DL (ref 2–4)
GLUCOSE BLD STRIP.AUTO-MCNC: 108 MG/DL (ref 65–100)
GLUCOSE SERPL-MCNC: 94 MG/DL (ref 65–100)
HCT VFR BLD AUTO: 38.7 % (ref 35–47)
HGB BLD-MCNC: 12.8 G/DL (ref 11.5–16)
IMM GRANULOCYTES # BLD: 0 K/UL (ref 0–0.04)
IMM GRANULOCYTES NFR BLD AUTO: 0 % (ref 0–0.5)
LYMPHOCYTES # BLD: 1.8 K/UL (ref 0.8–3.5)
LYMPHOCYTES NFR BLD: 38 % (ref 12–49)
MCH RBC QN AUTO: 28.4 PG (ref 26–34)
MCHC RBC AUTO-ENTMCNC: 33.1 G/DL (ref 30–36.5)
MCV RBC AUTO: 86 FL (ref 80–99)
METHADONE UR QL: NEGATIVE
MITOCHONDRIA M2 IGG SER-ACNC: 6.4 UNITS (ref 0–20)
MONOCYTES # BLD: 0.4 K/UL (ref 0–1)
MONOCYTES NFR BLD: 8 % (ref 5–13)
NEUTS SEG # BLD: 2.2 K/UL (ref 1.8–8)
NEUTS SEG NFR BLD: 45 % (ref 32–75)
NRBC # BLD: 0 K/UL (ref 0–0.01)
NRBC BLD-RTO: 0 PER 100 WBC
OPIATES UR QL: NEGATIVE
PCP UR QL: NEGATIVE
PLATELET # BLD AUTO: 200 K/UL (ref 150–400)
PMV BLD AUTO: 9.3 FL (ref 8.9–12.9)
POTASSIUM SERPL-SCNC: 3.8 MMOL/L (ref 3.5–5.1)
PROT SERPL-MCNC: 7.1 G/DL (ref 6.4–8.2)
RBC # BLD AUTO: 4.5 M/UL (ref 3.8–5.2)
SERVICE CMNT-IMP: ABNORMAL
SODIUM SERPL-SCNC: 141 MMOL/L (ref 136–145)
WBC # BLD AUTO: 4.8 K/UL (ref 3.6–11)

## 2018-09-18 PROCEDURE — 80053 COMPREHEN METABOLIC PANEL: CPT | Performed by: FAMILY MEDICINE

## 2018-09-18 PROCEDURE — 74011250637 HC RX REV CODE- 250/637: Performed by: FAMILY MEDICINE

## 2018-09-18 PROCEDURE — 85025 COMPLETE CBC W/AUTO DIFF WBC: CPT | Performed by: FAMILY MEDICINE

## 2018-09-18 PROCEDURE — 86038 ANTINUCLEAR ANTIBODIES: CPT | Performed by: PHYSICIAN ASSISTANT

## 2018-09-18 PROCEDURE — 80307 DRUG TEST PRSMV CHEM ANLYZR: CPT | Performed by: FAMILY MEDICINE

## 2018-09-18 PROCEDURE — 82962 GLUCOSE BLOOD TEST: CPT

## 2018-09-18 PROCEDURE — 36415 COLL VENOUS BLD VENIPUNCTURE: CPT | Performed by: FAMILY MEDICINE

## 2018-09-18 RX ADMIN — FAMOTIDINE 20 MG: 20 TABLET, FILM COATED ORAL at 09:04

## 2018-09-18 RX ADMIN — Medication 10 ML: at 06:00

## 2018-09-18 NOTE — PROGRESS NOTES
Patient given discharge instructions and home instructions. PIV removed. Patient verbalizes understanding.  driving patient to home. Patient out of unit in wheelchair.

## 2018-09-18 NOTE — ROUTINE PROCESS
Bedside shift change report given to [address] [place] [address] Neva (oncoming nurse) by Tiana Mejia (offgoing nurse). Report included the following information SBAR, Kardex, Procedure Summary, Intake/Output, MAR, Accordion, Recent Results and Med Rec Status.

## 2018-09-18 NOTE — DISCHARGE SUMMARY
[address] [facility] [address] [place] [address]   Office [phone], Fax [phone]        Discharge Summary     Patient: Montrell Rios       MRN: [id number]       YOB: 1960       Age: 62 y.o. Date of admission:  9/16/2018    Date of discharge:  9/18/2018    Primary care provider:  Racheal Rey DO     Admitting provider:  Racheal Rey DO    Discharging provider(s): Marcel Lucas MD - Family Medicine Resident  Dr. Linda Trammell MD - [facility] Medicine Attending     Consultations  · GI    Procedures  · none    Discharge destination: Home. The patient is stable for discharge. Admission diagnosis  Transaminitis      Patient Presentation:   Montrell Rios is a 62 y.o. female who presents to the ER for elevated liver enzymes from urgent care center. Patient reports that on Tuesday 9/11 she noticed what appeared to be a bug bite on her neck. It was red and pruritic. By Thursday 9/13, she reports having as welling on her neck in the same area so she went to [facility]. It was thought that she had cellulitis, so she was started on keflex. Patient reports that on the night of Friday 9/14 she started having nausea without vomiting, some lightheadedness, and noticed dark, tea colored, urine. Today she went back to [facility] and she was told she had elevated liver enzymes from her previous visit and was told to go to the ED. She denies any Fever, chills, CP, palpitations, SOB. She does report having some abdominal bloating and burping transiently on Thursday but denies any abdominal pain or diarrhea. Patient denies any use of herbal or complementary/alternative medications. Denies any recent travel.     In the ER, vital signs were remarkable for T 98.1, , /83, RR 20, SPO2 99% on RA. Labs were remarkable for , CMP with Cr 0.50, BUN 4, Glu 143. UA showed trace ketones.  Abdominal ultrasound  showed diffuse fatty liver, no biliary ductal dilation, gallbladder distension without cholecystitis or cholelithiasis. CT abdomen/pelvis showed hepatic steatosis. Pt was treated with 1L NS bolus. Conditions treated during this hospitalization:    Transaminitis - Improving; Patient with no hx of alcohol abuse . LFT were WNL previously on 4/2018. Recent use of Keflex suspect drug induced. LFTs trending down, will need following with with PCP to check for return to normal. alcohol level, salicylate, Iron panel,  acetaminophen level, ceruloplasmin,  WNL, HIV (-). Will need to f/u  Actin SM antibody, Mitochondrial M2 AB, JAMESON. GI consulted appreciate recs will f/u outpatient   ---> 340   -  --> 90  - Alk Phos 368--> 329       Tachycardia -  at [facility] but patient asymptomatic. Patient reports hx of tachycardia at rest since she was a child. EKG at [facility] showed sinus tachycardia       Sarcoidosis- Diagnosed in 1991. untreated for >20 years    -advise to follow up with pulmonologist outpatient      GERD -   -can continue omeprazole on discharge       Stickler's syndrome - stable, patient takes no medications for this syndrome    Labs/Imaging Needed on follow up: Actin SM antibody, Mitochondrial M2 AB, JAMESON    Pending test results: At the time of your discharge the following test results are still pending: none. Please make sure you review these results with your outpatient follow-up provider(s). Specific symptoms to watch for: chest pain, shortness of breath, fever, chills, nausea, vomiting, diarrhea, change in mentation, falling, weakness, bleeding. DIET/what to eat:  Regular Diet    ACTIVITY:  Activity as tolerated    Wound care: none    Equipment needed:  none    Follow-up Care:    Follow-up Information     Follow up With Details Comments [address] [facility] MD Tessie Go on 9/21/2018 appt at 10:45 AM please arrive 15 minutes early [address]      Richi Murillo MD Schedule an appointment as soon as possible for a visit follow up of joint pain  [address] [place] [address]  [phone]            Physical examination at discharge  Visit Vitals    /79 (BP 1 Location: Right arm, BP Patient Position: At rest)    Pulse 82    Temp 98.3 °F (36.8 °C)    Resp 18    Ht 5' 3\" (1.6 m)    Wt 137 lb 5.6 oz (62.3 kg)    SpO2 98%    Breastfeeding No    BMI 24.33 kg/m2      Physical Examination:   General appearance - alert, well appearing, and in no distress   Mental status - normal mood, behavior, speech, dress, motor activity, and thought processes  Chest - clear to auscultation, no wheezes, rales or rhonchi, symmetric air entry  Heart - normal rate, regular rhythm, normal S1, S2, no murmurs, rubs, clicks or gallops  Abdomen - soft, nontender, nondistended, no masses or organomegaly  Neurological - alert, oriented, normal speech, no focal findings or movement disorder noted  Extremities - peripheral pulses normal, no pedal edema, no clubbing or cyanosis    Recent Results (from the past 24 hour(s))   GLUCOSE, POC    Collection Time: 09/17/18  4:47 PM   Result Value Ref Range    Glucose (POC) 120 (H) 65 - 100 mg/dL    Performed by Alpa Love (PCT)    GLUCOSE, POC    Collection Time: 09/17/18  9:19 PM   Result Value Ref Range    Glucose (POC) 130 (H) 65 - 100 mg/dL    Performed by Rosalina Hall (PCT)    METABOLIC PANEL, COMPREHENSIVE    Collection Time: 09/18/18  3:42 AM   Result Value Ref Range    Sodium 141 136 - 145 mmol/L    Potassium 3.8 3.5 - 5.1 mmol/L    Chloride 105 97 - 108 mmol/L    CO2 27 21 - 32 mmol/L    Anion gap 9 5 - 15 mmol/L    Glucose 94 65 - 100 mg/dL    BUN 7 6 - 20 MG/DL    Creatinine 0.45 (L) 0.55 - 1.02 MG/DL    BUN/Creatinine ratio 16 12 - 20      GFR est AA >60 >60 ml/min/1.73m2    GFR est non-AA >60 >60 ml/min/1.73m2    Calcium 9.4 8.5 - 10.1 MG/DL    Bilirubin, total 1.4 (H) 0.2 - 1.0 MG/DL    ALT (SGPT) 340 (H) 12 - 78 U/L    AST (SGOT) 90 (H) 15 - 37 U/L    Alk.  phosphatase 329 (H) 45 - 117 U/L Protein, total 7.1 6.4 - 8.2 g/dL    Albumin 3.4 (L) 3.5 - 5.0 g/dL    Globulin 3.7 2.0 - 4.0 g/dL    A-G Ratio 0.9 (L) 1.1 - 2.2     CBC WITH AUTOMATED DIFF    Collection Time: 09/18/18  3:42 AM   Result Value Ref Range    WBC 4.8 3.6 - 11.0 K/uL    RBC 4.50 3.80 - 5.20 M/uL    HGB 12.8 11.5 - 16.0 g/dL    HCT 38.7 35.0 - 47.0 %    MCV 86.0 80.0 - 99.0 FL    MCH 28.4 26.0 - 34.0 PG    MCHC 33.1 30.0 - 36.5 g/dL    RDW 13.2 11.5 - 14.5 %    PLATELET 297 412 - 222 K/uL    MPV 9.3 8.9 - 12.9 FL    NRBC 0.0 0  WBC    ABSOLUTE NRBC 0.00 0.00 - 0.01 K/uL    NEUTROPHILS 45 32 - 75 %    LYMPHOCYTES 38 12 - 49 %    MONOCYTES 8 5 - 13 %    EOSINOPHILS 7 0 - 7 %    BASOPHILS 1 0 - 1 %    IMMATURE GRANULOCYTES 0 0.0 - 0.5 %    ABS. NEUTROPHILS 2.2 1.8 - 8.0 K/UL    ABS. LYMPHOCYTES 1.8 0.8 - 3.5 K/UL    ABS. MONOCYTES 0.4 0.0 - 1.0 K/UL    ABS. EOSINOPHILS 0.4 0.0 - 0.4 K/UL    ABS. BASOPHILS 0.0 0.0 - 0.1 K/UL    ABS. IMM. GRANS. 0.0 0.00 - 0.04 K/UL    DF AUTOMATED     GLUCOSE, POC    Collection Time: 09/18/18  7:30 AM   Result Value Ref Range    Glucose (POC) 108 (H) 65 - 100 mg/dL    Performed by REYES DARIUS (PCT)    DRUG SCREEN, URINE    Collection Time: 09/18/18  9:10 AM   Result Value Ref Range    AMPHETAMINES NEGATIVE  NEG      BARBITURATES NEGATIVE  NEG      BENZODIAZEPINES NEGATIVE  NEG      COCAINE NEGATIVE  NEG      METHADONE NEGATIVE  NEG      OPIATES NEGATIVE  NEG      PCP(PHENCYCLIDINE) NEGATIVE  NEG      THC (TH-CANNABINOL) NEGATIVE  NEG      Drug screen comment (NOTE)          Discharge Medication List as of 9/18/2018 11:21 AM      CONTINUE these medications which have NOT CHANGED    Details   omeprazole (PRILOSEC) 20 mg capsule Take 20 mg by mouth daily. , Historical Med      metFORMIN ER (GLUCOPHAGE XR) 500 mg tablet Take 1 Tab by mouth two (2) times daily (with meals). , Normal, Disp-180 Tab, R-3             Admission imaging studies:      Results from Hospital Encounter encounter on 12/05/17   XR CHEST PA LAT   Narrative EXAM:  XR CHEST PA LAT    INDICATION:   Shortness of breath and headache. History of sarcoidosis. COMPARISON: Chest x-ray 11/11/2014. Jerry Ladd FINDINGS: PA and lateral radiographs of the chest demonstrate clear lungs. The  cardiac and mediastinal contours and pulmonary vascularity are normal.  The  bones and soft tissues are within normal limits. Impression IMPRESSION: No acute findings. Results from [place] encounter on 09/16/18   US ABD LTD   Narrative INDICATION:  Hepatitis    COMPARISON:  Ultrasound 11/11/2014    FINDINGS: Limited right upper quadrant ultrasound was performed. The liver is  increased in echogenicity. The common bile duct is normal measuring 4 mm in  diameter. The gallbladder is distended. There is no evidence of gallstone. Sonographic Dary Lek sign is absent. There is no gallbladder wall thickening or  pericholecystic fluid. The right kidney measures 10.9 cm in length. There is no  hydronephrosis or visible ascites. Impression IMPRESSION:     1. Diffuse fatty infiltration of the liver. 2. No biliary ductal dilatation. 3. Gallbladder distention. No cholecystitis or cholelithiasis. Results from Hospital Encounter encounter on 09/16/18   CT ABD PELV W CONT   Narrative INDICATION: elevated lft     EXAM: CT Abdomen and Pelvis is performed with 100 mL Isovue 370 contrast IV  without complication. CT dose reduction was achieved through use of a  standardized protocol tailored for this examination and automatic exposure  control for dose modulation. FINDINGS:   There is no inflammation, ascites, pneumoperitoneum or significant adenopathy. Liver is fatty without focal lesion. Bile ducts are not enlarged. Gallbladder is  unremarkable by CT. Pancreas shows no mass or inflammation. Spleen is  unremarkable. Adrenal glands are normal in size. Kidneys show no mass or  hydronephrosis. Aorta is without aneurysm.      The appendix is normal. Bowels are not dilated. The bladder is not distended. The distal ureters are not dilated. There is no apparent pelvic mass. Impression IMPRESSION: Hepatic steatosis. No biliary dilation. No acute finding.                   No procedure found.      -------------------------------------------------------------------------------------------------------------------    Chronic Diagnoses:    Problem List as of 9/18/2018  Date Reviewed: 9/16/2018          Codes Class Noted - Resolved    Transaminitis ICD-10-CM: R74.0  ICD-9-CM: 790.4  9/16/2018 - Present        * (Principal)Acute hepatitis ICD-10-CM: B17.9  ICD-9-CM: 570  9/16/2018 - Present        Distal radius fracture ICD-10-CM: S52.509A  ICD-9-CM: 813.42  8/10/2012 - Present                Signed:      Rowdy Johnson MD   Family Medicine Resident      9/18/2018     Dr. Carina Godoy MD   Family Medicine Attending

## 2018-09-18 NOTE — PROGRESS NOTES
Gastroenterology Progress Note 2018 Admit Date: 2018 Narrative Assessment and Plan · Abnormal liver enzymes - etiology unclear but suspect it may be drug induced given history · LFTs are trending down and patient is asymptomatic · Stable for discharge from GI standpoint · Recommend repeat labs with PCP in 10-14 days · Will follow up on liver serologies and provide further recs if needed Subjective:  
· Patient has no complaints. Wants to go home. Denies abdominal pain, nausea or vomiting. Tolerating regular diet. ROS:  The previous review of systems on initial consultation / H&P is noted and reviewed. Specific changes noted above in HPI. Current Medications:  
 
Current Facility-Administered Medications Medication Dose Route Frequency  famotidine (PEPCID) tablet 20 mg  20 mg Oral DAILY  sodium chloride (NS) flush 5-10 mL  5-10 mL IntraVENous Q8H  
 sodium chloride (NS) flush 5-10 mL  5-10 mL IntraVENous PRN  
 enoxaparin (LOVENOX) injection 40 mg  40 mg SubCUTAneous Q24H  pantoprazole (PROTONIX) tablet 40 mg  40 mg Oral DAILY  insulin lispro (HUMALOG) injection   SubCUTAneous AC&HS  
 glucose chewable tablet 16 g  4 Tab Oral PRN  
 dextrose (D50W) injection syrg 12.5-25 g  12.5-25 g IntraVENous PRN  
 glucagon (GLUCAGEN) injection 1 mg  1 mg IntraMUSCular PRN Objective: VITALS:  
Last 24hrs VS reviewed since prior progress note. Most recent are: 
Visit Vitals  /79 (BP 1 Location: Right arm, BP Patient Position: At rest)  Pulse 82  Temp 98.3 °F (36.8 °C)  Resp 18  Ht 5' 3\" (1.6 m)  Wt 62.3 kg (137 lb 5.6 oz)  SpO2 98%  Breastfeeding No  
 BMI 24.33 kg/m2 Temp (24hrs), Av.3 °F (36.8 °C), Min:97.9 °F (36.6 °C), Max:98.8 °F (37.1 °C) Intake/Output Summary (Last 24 hours) at 18 1013 Last data filed at 18 7888 Gross per 24 hour Intake          4726.67 ml  
 Output             2300 ml Net          2426.67 ml EXAM: 
General: Comfortable, no distress   
HEENT: Atraumatic skull, pupils equal 
Lungs:  No abnormal audible breath sounds. Speaking in complete sentences Abdomen: Nondistended, nontender. No mass, guarding or rebound Musc:  No skeletal defects or deformities Neurologic:  Cranial nerves grossly intact, moves all 4 extremities Psych:   Good insight. Not anxious nor agitated Lab Data Reviewed:  
Recent Labs  
   09/18/18 
 0342 09/17/18 0239 09/16/18 
 1646 WBC  4.8  5.3  6.2 HGB  12.8  12.9  13.7 HCT  38.7  38.9  41.5 PLT  200  183  194 Recent Labs  
   09/18/18 
 0342 09/17/18 0239 09/16/18 
 1915 09/16/18 
 1646 NA  141  140   --   138  
K  3.8  3.8   --   4.0  
CL  105  104   --   103 CO2  27  29   --   24  
GLU  94  98   --   143* BUN  7  4*   --   4*  
CREA  0.45*  0.47*   --   0.50* CA  9.4  9.2   --   9.4 ALB  3.4*  3.6   --   3.8 TBILI  1.4*  2.6*   --   2.6* SGOT  90*  214*   --   303* ALT  340*  488*   --   590* INR   --    --   1.1   --   
 
Lab Results Component Value Date/Time Glucose (POC) 108 (H) 09/18/2018 07:30 AM  
 Glucose (POC) 130 (H) 09/17/2018 09:19 PM  
 Glucose (POC) 120 (H) 09/17/2018 04:47 PM  
 Glucose (POC) 121 (H) 09/17/2018 11:40 AM  
 Glucose (POC) 104 (H) 09/17/2018 07:02 AM  
 
No results for input(s): PH, PCO2, PO2, HCO3, FIO2 in the last 72 hours. Recent Labs  
   09/16/18 1915 INR  1.1 Assessment: (See above) Principal Problem: 
  Acute hepatitis (9/16/2018) Active Problems: 
  Transaminitis (9/16/2018) Plan: (See above) Signed By: 
Maximus Hodgson PA-C 
9/18/2018 10:13 AM 
Shahbaz Muñoz MD

## 2018-09-18 NOTE — PROGRESS NOTES
Visit charted 9/18/18 Spiritual Care Partner Volunteer visited patient in 5 Med Surg on 9/17/18. Documented by:Chaplain Merchant John [address] [place] [address] ([id number])

## 2018-09-19 LAB — ANA TITR SER IF: NEGATIVE {TITER}

## 2018-09-21 ENCOUNTER — OFFICE VISIT (OUTPATIENT)
Dept: FAMILY MEDICINE CLINIC | Age: 58
End: 2018-09-21

## 2018-09-21 VITALS
WEIGHT: 137 LBS | OXYGEN SATURATION: 97 % | RESPIRATION RATE: 18 BRPM | DIASTOLIC BLOOD PRESSURE: 75 MMHG | TEMPERATURE: 98.3 F | HEART RATE: 83 BPM | BODY MASS INDEX: 24.27 KG/M2 | HEIGHT: 63 IN | SYSTOLIC BLOOD PRESSURE: 109 MMHG

## 2018-09-21 DIAGNOSIS — R74.01 TRANSAMINITIS: ICD-10-CM

## 2018-09-21 DIAGNOSIS — Z09 HOSPITAL DISCHARGE FOLLOW-UP: Primary | ICD-10-CM

## 2018-09-21 NOTE — PROGRESS NOTES
HPI Meryle Ade is a 62 y.o. female who presents for a transitional care from [facility]. Patient was admitted from 9/16 to 9/18 for drug induced transaminitis 2/2 Keflex that was started for soft tissue infection by urgent care. Keflex was stopped and patient discharged home with close follow up with PCP. LFTs were imporved upon discharge. I reviewed her hospital stay. AST improved from 488-->340  ALT improved from 214-->90  Alk phos 368-->329    Abdominal US showed diffuse fatty infiltration of the liver and gallbladder distension without cholecystitis or cholelithiasis. Concerns today: None. Patient reports she's doing well. Asking if the remainder of the labs resutls are  back. Allergies: Allergies   Allergen Reactions    Latex Itching     inflammation    Augmentin [Amoxicillin-Pot Clavulanate] Hives    Keflex [Cephalexin] Other (comments)     Elevated LFT's       Meds:   Current Outpatient Prescriptions   Medication Sig Dispense Refill    metFORMIN ER (GLUCOPHAGE XR) 500 mg tablet Take 1 Tab by mouth two (2) times daily (with meals). 180 Tab 3    omeprazole (PRILOSEC) 20 mg capsule Take 20 mg by mouth daily.          PMH:  Past Medical History:   Diagnosis Date    Diabetes ([address]     Neurological disorder     migraines    Sarcoidosis     Stickler's syndrome     daughter has-pt may have (genetic connective tissue disorder)       SH:  Smoker:  History   Smoking Status    Former Smoker   Smokeless Tobacco    Never Used       ETOH:   History   Alcohol Use No       FH:   Family History   Problem Relation Age of Onset    Breast Cancer Sister        ROS: Positive for Items in bold:   Constitutional: headache, fever, fatigue, weight loss or weight gain      Eyes: redness, pruritis, pain, visual changes, swelling, or discharge      Ears: ear pain, loss or changes in hearing     Nose: congestion, rhinorrhea  Oropharynx: sore throat, lesions in throat  Cardiac: chest pain Resp: cough or shortness of breath      GI: nausea, vomiting, constipation, diarrhea, blood in stool  : frequency, urgency, dysuria, hematuria   Neuro: dizziness, numbness, tingling  Psych: anxiety, depression, stress     Physical Exam:  Visit Vitals    /75 (BP 1 Location: Right arm, BP Patient Position: Sitting)    Pulse 83    Temp 98.3 °F (36.8 °C) (Oral)    Resp 18    Ht 5' 3\" (1.6 m)    Wt 137 lb (62.1 kg)    SpO2 97%    BMI 24.27 kg/m2       Gen: No apparent distress. Alert and oriented and responds to all questions appropriately. Oropharynx: No oral lesions or exudates. Neck: Supple; no masses; no thyromegaly appreciated  Lungs: Respirations unlabored; clear to auscultation bilaterally  Cardio: Regular, rate,  without murmurs, gallops or rubs   Abdomen: Soft; nontender; nondistended; normoactive bowel sounds; no masses or organomegaly  Neurologic: No focal neurologic deficits. Strength and sensation grossly intact. Coordination and gait grossly intact. Ext: Well perfused. No edema. Skin: No obvious rashes.       Lab Results   Component Value Date/Time    Sodium 141 09/18/2018 03:42 AM    Potassium 3.8 09/18/2018 03:42 AM    Chloride 105 09/18/2018 03:42 AM    CO2 27 09/18/2018 03:42 AM    Anion gap 9 09/18/2018 03:42 AM    Glucose 94 09/18/2018 03:42 AM    BUN 7 09/18/2018 03:42 AM    Creatinine 0.45 (L) 09/18/2018 03:42 AM    BUN/Creatinine ratio 16 09/18/2018 03:42 AM    GFR est AA >60 09/18/2018 03:42 AM    GFR est non-AA >60 09/18/2018 03:42 AM    Calcium 9.4 09/18/2018 03:42 AM     Lab Results   Component Value Date/Time    Cholesterol, total 223 (H) 04/20/2018 02:54 PM    HDL Cholesterol 46 04/20/2018 02:54 PM    LDL, calculated 128 (H) 04/20/2018 02:54 PM    VLDL, calculated 49 (H) 04/20/2018 02:54 PM    Triglyceride 245 (H) 04/20/2018 02:54 PM     I have personally reviewed the labs results        Assessment and Plan:   Ida Mccormick is a 62 y.o. female who presents for transitional care for Transaminitis. ICD-10-CM ICD-9-CM    1. Hospital discharge follow-up Z09 V67.59 HEPATIC FUNCTION PANEL   2. Transaminitis R74.0 790.4        Transaminitis  drug induced ( Keflex) - Improved up on discharge. Patient asymptomatic. Actin SM antibody, Mitochondrial M2 AB and JAMESON negative. Reviewed her hospital stay. LFTs improved upon discharge. AST improved from 488-->340, ALT improved from 214-->90 and Alk phos 368-->329  - Will check LFTs to trend down LFTs      Sarcoidosis- Diagnosed in 1991. untreated for >20 years. No respiratory sxs at this time. - Follow up with pulmonologist        GERD -   - omeprazole was held during hospital stay concerned for contributing to transaminitis. Was recommended to continue with omeprazole on discharge. - Patient could try pepcid/Zantac first and continue omeprazole if refractory to H2 blockers  - Avoid triggers, elevate the head of the bed  - Follow up with GI          Discussed diagnoses in detail with patient. Patient expressed understanding of and agreement to above plan. All questions and concerns addressed. Medication risks/benefits/side effects discussed with patient. Patient is counseled to return to the office and/or ED if symptoms do not improve as expected. Patient  discussed with Dr. Talisha Garcia, Attending Physician.     Alekasndra Bonilla MD  09/23/18    Family Medicine Resident

## 2018-09-21 NOTE — PROGRESS NOTES
Chief Complaint   Patient presents with    Transitions Of Care     1. Have you been to the ER, urgent care clinic since your last visit? Hospitalized since your last visit? Yes When: 9/16/18 Where: [facility] Reason for visit: high liver enzymes    2. Have you seen or consulted any other health care providers outside of the [facility] since your last visit? Include any pap smears or colon screening.  No

## 2018-09-21 NOTE — MR AVS SNAPSHOT
[address] 
[phone] Patient: Montrell Rios MRN: [id number] YR Visit Information Date & Time Provider Department Dept. Phone Encounter #  
 2018 10:45 AM Areli Farr MD [address] [place] [address] [phone] [id number] Your Appointments 1/10/2019  1:00 PM  
New Patient with Kali Smith MD  
[facility]) Appt Note: New patient referred by Dr. Jerry Arora with [id number]  for locked joints, pain in muscles [facility] [place] [address] [address] [facility] [id number] Upcoming Health Maintenance Date Due  
 FOOT EXAM Q1 1970 EYE EXAM RETINAL OR DILATED Q1 1970 Pneumococcal 19-64 Medium Risk (1 of 1 - PPSV23) 1979 FOBT Q 1 YEAR AGE 50-75 2010 Influenza Age 5 to Adult 2018 HEMOGLOBIN A1C Q6M 3/17/2019 MICROALBUMIN Q1 2019 LIPID PANEL Q1 2019 BREAST CANCER SCRN MAMMOGRAM 2020 PAP AKA CERVICAL CYTOLOGY 2020 DTaP/Tdap/Td series (2 - Td) 2027 Allergies as of 2018  Review Complete On: 2018 By: Areli Farr MD  
  
 Severity Noted Reaction Type Reactions Latex Medium 08/10/2012   Systemic Itching  
 inflammation Augmentin [Amoxicillin-pot Clavulanate]  2014    Hives Keflex [Cephalexin]  2018    Other (comments) Elevated LFT's  
  
Current Immunizations  Never Reviewed Name Date Tdap 2017 Not reviewed this visit You Were Diagnosed With   
  
 Codes Comments Hospital discharge follow-up    -  Primary ICD-10-CM: [facility] ICD-9-CM: V67.59 Vitals BP Pulse Temp Resp Height(growth percentile) Weight(growth percentile) 109/75 (BP 1 Location: Right arm, BP Patient Position: Sitting) 83 98.3 °F (36.8 °C) (Oral) 18 5' 3\" (1.6 m) 137 lb (62.1 kg) SpO2 BMI OB Status Smoking Status 97% 24.27 kg/m2 Postmenopausal Former Smoker Vitals History BMI and BSA Data Body Mass Index Body Surface Area  
 24.27 kg/m 2 1.66 m 2 Preferred Pharmacy Pharmacy Name Phone [facility] [address] [facility] [address]  [facility] [id number] [facility] [phone] Your Updated Medication List  
  
   
This list is accurate as of 9/21/18 11:50 AM.  Always use your most recent med list.  
  
  
  
  
 metFORMIN  mg tablet Commonly known as:  GLUCOPHAGE XR Take 1 Tab by mouth two (2) times daily (with meals). omeprazole 20 mg capsule Commonly known as:  PRILOSEC Take 20 mg by mouth daily. We Performed the Following HEPATIC FUNCTION PANEL [34463 CPT(R)] Patient Instructions Indigestion (Dyspepsia or Heartburn): Care Instructions Your Care Instructions Sometimes it can be hard to pinpoint the cause of indigestion. (It is also called dyspepsia or heartburn.) Most cases of an upset stomach with bloating, burning, burping, and nausea are minor and go away within several hours. Home treatment and over-the-counter medicine often are able to control symptoms. But if you take medicine to relieve your indigestion without making diet and lifestyle changes, your symptoms are likely to return again and again. If you get indigestion often, it may be a sign of a more serious medical problem. Be sure to follow up with your doctor, who may want to do tests to be sure of the cause of your indigestion. Follow-up care is a key part of your treatment and safety. Be sure to make and go to all appointments, and call your doctor if you are having problems. It's also a good idea to know your test results and keep a list of the medicines you take. How can you care for yourself at home? · Your doctor may recommend over-the-counter medicine.  For mild or occasional indigestion, antacids such as Gaviscon, Mylanta, Maalox, or Tums, may help. Be safe with medicines. Be careful when you take over-the-counter antacid medicines. Many of these medicines have aspirin in them. Read the label to make sure that you are not taking more than the recommended dose. Too much aspirin can be harmful. · Your doctor also may recommend over-the-counter acid reducers, such as Pepcid AC, Tagamet HB, Zantac 75, or Prilosec. Read and follow all instructions on the label. If you use these medicines often, talk with your doctor. · Change your eating habits. ¨ It's best to eat several small meals instead of two or three large meals. ¨ After you eat, wait 2 to 3 hours before you lie down. ¨ Chocolate, mint, and alcohol can make GERD worse. ¨ Spicy foods, foods that have a lot of acid (like tomatoes and oranges), and coffee can make GERD symptoms worse in some people. If your symptoms are worse after you eat a certain food, you may want to stop eating that food to see if your symptoms get better. · Do not smoke or chew tobacco. Smoking can make GERD worse. If you need help quitting, talk to your doctor about stop-smoking programs and medicines. These can increase your chances of quitting for good. · If you have GERD symptoms at night, raise the head of your bed 6 to 8 inches. You can do this by putting the frame on blocks or placing a foam wedge under the head of your mattress. (Adding extra pillows does not work.) · Do not wear tight clothing around your middle. · Lose weight if you need to. Losing just 5 to 10 pounds can help. · Do not take anti-inflammatory medicines, such as aspirin, ibuprofen (Advil, Motrin), or naproxen (Aleve). These can irritate the stomach. If you need a pain medicine, try acetaminophen (Tylenol), which does not cause stomach upset. When should you call for help? Call your doctor now or seek immediate medical care if: 
  · You have new or worse belly pain.  
  · You are vomiting.  Watch closely for changes in your health, and be sure to contact your doctor if: 
  · You have new or worse symptoms of indigestion.  
  · You have trouble or pain swallowing.  
  · You are losing weight.  
  · You do not get better as expected. Where can you learn more? Go to http://raz-gabe.info/. Enter S634 in the search box to learn more about \"Indigestion (Dyspepsia or Heartburn): Care Instructions. \" Current as of: May 12, 2017 Content Version: 11.7 © 1238-8520 [employer]. Care instructions adapted under license by [employer] (which disclaims liability or warranty for this information). If you have questions about a medical condition or this instruction, always ask your healthcare professional. [address] any warranty or liability for your use of this information. Introducing [facility]! Dear Alexx Mancuso: 
Thank you for requesting a [employer] account. Our records indicate that you already have an active [employer] account. You can access your account anytime at [email or web address] Did you know that you can access your hospital and ER discharge instructions at any time in [employer]? You can also review all of your test results from your hospital stay or ER visit. Additional Information If you have questions, please visit the Frequently Asked Questions section of the [employer] website at [email or web address] Remember, [employer] is NOT to be used for urgent needs. For medical emergencies, dial 911. Now available from your iPhone and Android! Please provide this summary of care documentation to your next provider. Your primary care clinician is listed as Blanca Darnell. If you have any questions after today's visit, please call [phone].

## 2018-09-21 NOTE — PATIENT INSTRUCTIONS
Indigestion (Dyspepsia or Heartburn): Care Instructions  Your Care Instructions  Sometimes it can be hard to pinpoint the cause of indigestion. (It is also called dyspepsia or heartburn.) Most cases of an upset stomach with bloating, burning, burping, and nausea are minor and go away within several hours. Home treatment and over-the-counter medicine often are able to control symptoms. But if you take medicine to relieve your indigestion without making diet and lifestyle changes, your symptoms are likely to return again and again. If you get indigestion often, it may be a sign of a more serious medical problem. Be sure to follow up with your doctor, who may want to do tests to be sure of the cause of your indigestion. Follow-up care is a key part of your treatment and safety. Be sure to make and go to all appointments, and call your doctor if you are having problems. It's also a good idea to know your test results and keep a list of the medicines you take. How can you care for yourself at home? · Your doctor may recommend over-the-counter medicine. For mild or occasional indigestion, antacids such as Gaviscon, Mylanta, Maalox, or Tums, may help. Be safe with medicines. Be careful when you take over-the-counter antacid medicines. Many of these medicines have aspirin in them. Read the label to make sure that you are not taking more than the recommended dose. Too much aspirin can be harmful. · Your doctor also may recommend over-the-counter acid reducers, such as Pepcid AC, Tagamet HB, Zantac 75, or Prilosec. Read and follow all instructions on the label. If you use these medicines often, talk with your doctor. · Change your eating habits. ¨ It's best to eat several small meals instead of two or three large meals. ¨ After you eat, wait 2 to 3 hours before you lie down. ¨ Chocolate, mint, and alcohol can make GERD worse.   ¨ Spicy foods, foods that have a lot of acid (like tomatoes and oranges), and coffee can make GERD symptoms worse in some people. If your symptoms are worse after you eat a certain food, you may want to stop eating that food to see if your symptoms get better. · Do not smoke or chew tobacco. Smoking can make GERD worse. If you need help quitting, talk to your doctor about stop-smoking programs and medicines. These can increase your chances of quitting for good. · If you have GERD symptoms at night, raise the head of your bed 6 to 8 inches. You can do this by putting the frame on blocks or placing a foam wedge under the head of your mattress. (Adding extra pillows does not work.)  · Do not wear tight clothing around your middle. · Lose weight if you need to. Losing just 5 to 10 pounds can help. · Do not take anti-inflammatory medicines, such as aspirin, ibuprofen (Advil, Motrin), or naproxen (Aleve). These can irritate the stomach. If you need a pain medicine, try acetaminophen (Tylenol), which does not cause stomach upset. When should you call for help? Call your doctor now or seek immediate medical care if:    · You have new or worse belly pain.     · You are vomiting.    Watch closely for changes in your health, and be sure to contact your doctor if:    · You have new or worse symptoms of indigestion.     · You have trouble or pain swallowing.     · You are losing weight.     · You do not get better as expected. Where can you learn more? Go to http://raz-gabe.info/. Enter D792 in the search box to learn more about \"Indigestion (Dyspepsia or Heartburn): Care Instructions. \"  Current as of: May 12, 2017  Content Version: 11.7  © 0503-4513 [employer]. Care instructions adapted under license by [employer] (which disclaims liability or warranty for this information).  If you have questions about a medical condition or this instruction, always ask your healthcare professional. [address] any warranty or liability for your use of this information.

## 2018-09-22 LAB
ALBUMIN SERPL-MCNC: 4.5 G/DL (ref 3.5–5.5)
ALP SERPL-CCNC: 225 IU/L (ref 39–117)
ALT SERPL-CCNC: 99 IU/L (ref 0–32)
AST SERPL-CCNC: 22 IU/L (ref 0–40)
BILIRUB DIRECT SERPL-MCNC: 0.25 MG/DL (ref 0–0.4)
BILIRUB SERPL-MCNC: 0.6 MG/DL (ref 0–1.2)
PROT SERPL-MCNC: 7.2 G/DL (ref 6–8.5)

## 2018-09-25 ENCOUNTER — PATIENT MESSAGE (OUTPATIENT)
Dept: FAMILY MEDICINE CLINIC | Age: 58
End: 2018-09-25

## 2018-09-25 DIAGNOSIS — R74.01 TRANSAMINITIS: Primary | ICD-10-CM

## 2018-09-28 ENCOUNTER — TELEPHONE (OUTPATIENT)
Dept: FAMILY MEDICINE CLINIC | Age: 58
End: 2018-09-28

## 2018-09-28 NOTE — TELEPHONE ENCOUNTER
----- Message from Maggy Whalen sent at 9/28/2018  2:00 PM EDT -----  Regarding: Vest/telephone  Pt was discharged from [facility] on 9/18/18 and she is requesting to know if she could come in to have the lab work the hospital wanted her to have on her discharge papers. She is requesting to know if she could go back on Omeprazole. Pts number is [phone]. She has already had her hospital f/up.

## 2018-09-29 NOTE — TELEPHONE ENCOUNTER
From: Dallas Beach  Sent: 9/28/2018 6:00 PM EDT  To: Jhoana Dinero DO  Subject: RE: Test Results Question    Dr. Scar Rg,   You and I seem to be on the same page. I thought waiting a little longer for the blood work is a good idea. I will get that done after I get back in town. (mid Oct). Will you put an order for that for after Oct 15rh? Thanks for responding  . Have a great weekend  Mathew West  ----- Message -----  From: Jhoana Dinero DO  Sent: 9/28/2018 4:22 PM EDT  To: Dallas Beach  Subject: RE: Test Results Question    Hi there. I have seen your labs and luckily your liver tests continue to improve - great news! I would recommend follow up testing soon but I don't think there's any urgency to do it in the 10-14 day window since we have proven that they continue to improve. I think we could wait another couple weeks honestly (they're more likely to be normal by then, and again, there's nothing specific about 10-14 days.) I think it would be okay to start taking the Omeprazole again if the Pepcid isn't working. You could try Zantac if you wanted, perhaps it would work better than Pepcid, but if you just want to go back to Omeprazole that's okay too since the liver looks better. Unfortunately all of the rheumatology doctors have very long waits. Dr. Justin Fofana is very good and thorough and I think waiting several months is okay given that we do not think you have anything urgent going on with your sarcoid. If you would like to try to see if other rheumatology doctors could see you sooner, there is also Dr. Rocael Resendez ([phone]) or Dr. Lita Blanchard ([phone]). You could call and ask what the wait is and if you need me to put in a new referral to one of the others I could. Let me know if you have other questions. Have a good weekend.      Thanks,  Dr. Scar Rg          ----- Message -----   From: Dallas Beach   Sent: 9/25/2018 6:29 PM EDT   To: Jhoana Dinero DO  Subject: Test Results Question    Dr. Waqas Estrella,    Have you looked at the most recent results from my visit to Dr. Eyal Chua? Although there is improvement, I thought the plan was to retest at 10-14 days, but these results were from 3 days after discharge. Should I plan for additional testing early next week to fit the 10-14 day window? If so, should I go to your office? In the hospital, I was told to stop taking the omeprazole and take Pepsid. The Pepsid isn't effective, can I go back to the omeprazole or should try something else at least until the test results return to normal?     I have made an appointment with the Rheumatologist Dr Denice Londono, but the earliest appt. is in January. Can you recommend another drStephanie, that might have something sooner?     Zara

## 2018-10-10 ENCOUNTER — OFFICE VISIT (OUTPATIENT)
Dept: FAMILY MEDICINE CLINIC | Age: 58
End: 2018-10-10

## 2018-10-10 VITALS
OXYGEN SATURATION: 98 % | SYSTOLIC BLOOD PRESSURE: 114 MMHG | TEMPERATURE: 98.7 F | BODY MASS INDEX: 24.1 KG/M2 | RESPIRATION RATE: 16 BRPM | WEIGHT: 136 LBS | DIASTOLIC BLOOD PRESSURE: 82 MMHG | HEART RATE: 111 BPM | HEIGHT: 63 IN

## 2018-10-10 DIAGNOSIS — J06.9 VIRAL URI WITH COUGH: Primary | ICD-10-CM

## 2018-10-10 DIAGNOSIS — J02.9 SORE THROAT: ICD-10-CM

## 2018-10-10 LAB
S PYO AG THROAT QL: NEGATIVE
VALID INTERNAL CONTROL?: YES

## 2018-10-10 NOTE — PROGRESS NOTES
Progress Note    Patient: Anni Novoa MRN: [id number]  SSN: [id number]    YOB: 1960  Age: 62 y.o. Sex: female        Chief Complaint   Patient presents with    Sore Throat     x 5 day     Cough     coughing up thick yellow mucus         Subjective:     Problems addressed:  Encounter Diagnoses     ICD-10-CM ICD-9-CM   1. Viral URI with cough J06.9 465.9    B97.89    2. Sore throat J02.9 12     Pt is a 63 y/o F with a PMH of Acute Hepatitis, T2DM and GERD presents with sore throat and cough. Pt's symptoms started Saturday 10/6 with a runny nose and sore throat, and then pt began coughing yesterday 10/9 and states that today her cough is getting worse. Cough is productive with thick yellow sputum. Pt has pain in throat with coughing episodes. Pt had a sick contact Thursday night 10/4, her daughter was coughing at the dinner table and has had similar symptoms as the pt. Pt is taking Mucinex for her cough. Pt was recently admitted to the hospital 9/16 for acute hepatitis, thought to be caused by Keflex which was prescribed several days prior for cellulitis. Pt denies any fever, chills, vomiting, diarrhea, or constipation. Pt reports her heart rate for a long time has normally been . Current and past medical information:    Current Medications after this visit[de-identified]     Current Outpatient Prescriptions   Medication Sig    omeprazole (PRILOSEC) 20 mg capsule Take 20 mg by mouth daily.  metFORMIN ER (GLUCOPHAGE XR) 500 mg tablet Take 1 Tab by mouth two (2) times daily (with meals). No current facility-administered medications for this visit.         Patient Active Problem List    Diagnosis Date Noted    Transaminitis 09/16/2018    Acute hepatitis 09/16/2018    Distal radius fracture 08/10/2012       Past Medical History:   Diagnosis Date    Diabetes [facility])     Neurological disorder     migraines    Sarcoidosis     Stickler's syndrome     daughter has-pt may have (genetic connective tissue disorder)       Allergies   Allergen Reactions    Latex Itching     inflammation    Augmentin [Amoxicillin-Pot Clavulanate] Hives    Keflex [Cephalexin] Other (comments)     Elevated LFT's       Past Surgical History:   Procedure Laterality Date    HX ORTHOPAEDIC Right     wrist       Social History     Social History    Marital status:      Spouse name: N/A    Number of children: N/A    Years of education: N/A     Social History Main Topics    Smoking status: Former Smoker    Smokeless tobacco: Never Used    Alcohol use No    Drug use: No    Sexual activity: Yes     Partners: Male     Other Topics Concern    None     Social History Narrative         Review of Systems   Constitutional: Negative for chills, fever and malaise/fatigue. HENT: Positive for congestion and sore throat. Negative for ear pain and nosebleeds. Eyes: Negative for blurred vision and double vision. Respiratory: Positive for cough and sputum production. Negative for hemoptysis, shortness of breath and wheezing. Cardiovascular: Negative for chest pain and palpitations. Gastrointestinal: Positive for nausea. Negative for abdominal pain, constipation, diarrhea and vomiting. Skin: Negative for itching and rash. Neurological: Negative for dizziness, weakness and headaches. Objective:     Vitals:    10/10/18 1755   BP: 114/82   Pulse: (!) 111   Resp: 16   Temp: 98.7 °F (37.1 °C)   TempSrc: Oral   SpO2: 98%   Weight: 136 lb (61.7 kg)   Height: 5' 3\" (1.6 m)      Body mass index is 24.09 kg/(m^2). Physical Exam   Constitutional: She appears well-developed and well-nourished. No distress. No acute distress   HENT:   Head: Normocephalic and atraumatic. Right Ear: External ear and ear canal normal. Tympanic membrane is not erythematous. Left Ear: External ear and ear canal normal. Tympanic membrane is not erythematous. Mouth/Throat: Posterior oropharyngeal erythema present.  No oropharyngeal exudate. Eyes: Conjunctivae and EOM are normal. Pupils are equal, round, and reactive to light. Neck: Normal range of motion. Neck supple. Cardiovascular: Normal rate, regular rhythm and normal heart sounds. No murmur heard. HR 96 during exam   Pulmonary/Chest: Effort normal and breath sounds normal. No respiratory distress. She has no wheezes. She has no rales. Lymphadenopathy:     She has no cervical adenopathy. Neurological: She is alert. Coordination normal.   Skin: Skin is warm and dry. No rash noted. She is not diaphoretic. No erythema. Psychiatric: She has a normal mood and affect. Her behavior is normal.   Nursing note and vitals reviewed. Health Maintenance Due   Topic Date Due    FOOT EXAM Q1  09/17/1970    EYE EXAM RETINAL OR DILATED Q1  09/17/1970    Pneumococcal 19-64 Medium Risk (1 of 1 - PPSV23) 09/17/1979    Shingrix Vaccine Age 50> (1 of 2) 09/17/2010    FOBT Q 1 YEAR AGE 50-75  09/17/2010    Influenza Age 9 to Adult  08/01/2018       Assessment and orders:     Encounter Diagnoses     ICD-10-CM ICD-9-CM   1. Viral URI with cough J06.9 465.9    B97.89    2. Sore throat J02.9 12       63 y/o F with PMH of Acute Hepatitis, GERD, and T2DM with 5 days of runny nose and sore throat and 1 day of worsening cough, likely viral URI. Viral URI with cough:   -Rapid strep negative, pt afebrile, no tonsillar exudates  -Pt can continue mucinex for cough  -Pt instructed to avoid taking tylenol for pain due to recent acute hepatitis  -Pt instructed on supportive care measures at home and voiced understanding and agreement that antibiotics are not indicated at this time    Sore Throat: Likely secondary to URI  -Supportive measures        Plan of care:  Discussed diagnoses in detail with patient. Medication risks/benefits/side effects discussed with patient. All of the patient's questions were addressed.  The patient understands and agrees with our plan of care.    The patient knows to call back if they are unsure of or forget any changes we discussed today or if the symptoms change. The patient received an After-Visit Summary which contains VS, orders, medication list and allergy list. This can be used as a \"mini-medical record\" should they have to seek medical care while out of town. Follow-up Disposition:  Return in about 1 month (around 11/10/2018), or if symptoms worsen or fail to improve.     Future Appointments  Date Time Provider Jorden Pereyraisti   1/10/2019 1:00 PM Silver Scott MD [address] [place] [address]       Signed By: Isra Carrera MD     October 10, 2018

## 2018-10-10 NOTE — MR AVS SNAPSHOT
[address] 
[phone] Patient: Curtis Gutierrez MRN: [id number] EEI:[id number] Visit Information Date & Time Provider Department Dept. Phone Encounter #  
 10/10/2018  6:15 PM Sue AldanaCarlie [phone] [id number] Your Appointments 1/10/2019  1:00 PM  
New Patient with Bismark Herrera MD  
[facility]: New patient referred by Dr. Desi Bowers with [address] [place] [address]  for locked joints, pain in muscles [facility] [place] [id number]  
[address] [place] [id number] Upcoming Health Maintenance Date Due  
 FOOT EXAM Q1 9/17/1970 EYE EXAM RETINAL OR DILATED Q1 9/17/1970 Pneumococcal 19-64 Medium Risk (1 of 1 - PPSV23) 9/17/1979 Shingrix Vaccine Age 50> (1 of 2) 9/17/2010 FOBT Q 1 YEAR AGE 50-75 9/17/2010 Influenza Age 5 to Adult 8/1/2018 HEMOGLOBIN A1C Q6M 3/17/2019 MICROALBUMIN Q1 4/20/2019 LIPID PANEL Q1 4/20/2019 BREAST CANCER SCRN MAMMOGRAM 1/9/2020 PAP AKA CERVICAL CYTOLOGY 12/11/2020 DTaP/Tdap/Td series (2 - Td) 12/11/2027 Allergies as of 10/10/2018  Review Complete On: 10/10/2018 By: Sue Aldana MD  
  
 Severity Noted Reaction Type Reactions Latex Medium 08/10/2012   Systemic Itching  
 inflammation Augmentin [Amoxicillin-pot Clavulanate]  11/11/2014    Hives Keflex [Cephalexin]  09/16/2018    Other (comments) Elevated LFT's  
  
Current Immunizations  Never Reviewed Name Date Tdap 12/11/2017 Not reviewed this visit You Were Diagnosed With   
  
 Codes Comments Sore throat    -  Primary ICD-10-CM: J02.9 ICD-9-CM: 540 Vitals BP Pulse Temp Resp Height(growth percentile) Weight(growth percentile) 114/82 (!) 111 98.7 °F (37.1 °C) (Oral) 16 5' 3\" (1.6 m) 136 lb (61.7 kg) SpO2 BMI OB Status Smoking Status 98% 24.09 kg/m2 Postmenopausal Former Smoker Vitals History BMI and BSA Data Body Mass Index Body Surface Area 24.09 kg/m 2 1.66 m 2 Preferred Pharmacy Pharmacy Name Phone [facility] [address] [facility] [address] [place], [place] - [phone] [facility] [phone] Your Updated Medication List  
  
   
This list is accurate as of 10/10/18  6:52 PM.  Always use your most recent med list.  
  
  
  
  
 metFORMIN  mg tablet Commonly known as:  GLUCOPHAGE XR Take 1 Tab by mouth two (2) times daily (with meals). omeprazole 20 mg capsule Commonly known as:  PRILOSEC Take 20 mg by mouth daily. We Performed the Following AMB POC RAPID STREP A [94625 CPT(R)] Introducing [facility]! Dear Yulissa Monroy: 
Thank you for requesting a sciencebite account. Our records indicate that you already have an active sciencebite account. You can access your account anytime at [email or web address] Did you know that you can access your hospital and ER discharge instructions at any time in sciencebite? You can also review all of your test results from your hospital stay or ER visit. Additional Information If you have questions, please visit the Frequently Asked Questions section of the sciencebite website at [email or web address] Remember, sciencebite is NOT to be used for urgent needs. For medical emergencies, dial 911. Now available from your iPhone and Android! Please provide this summary of care documentation to your next provider. Your primary care clinician is listed as Christy Núñez. If you have any questions after today's visit, please call [phone].

## 2018-10-10 NOTE — PATIENT INSTRUCTIONS
Sore Throat: Care Instructions  Your Care Instructions    Infection by bacteria or a virus causes most sore throats. Cigarette smoke, dry air, air pollution, allergies, and yelling can also cause a sore throat. Sore throats can be painful and annoying. Fortunately, most sore throats go away on their own. If you have a bacterial infection, your doctor may prescribe antibiotics. Follow-up care is a key part of your treatment and safety. Be sure to make and go to all appointments, and call your doctor if you are having problems. It's also a good idea to know your test results and keep a list of the medicines you take. How can you care for yourself at home? · If your doctor prescribed antibiotics, take them as directed. Do not stop taking them just because you feel better. You need to take the full course of antibiotics. · Gargle with warm salt water once an hour to help reduce swelling and relieve discomfort. Use 1 teaspoon of salt mixed in 1 cup of warm water. · Take an over-the-counter pain medicine, such as acetaminophen (Tylenol), ibuprofen (Advil, Motrin), or naproxen (Aleve). Read and follow all instructions on the label. · Be careful when taking over-the-counter cold or flu medicines and Tylenol at the same time. Many of these medicines have acetaminophen, which is Tylenol. Read the labels to make sure that you are not taking more than the recommended dose. Too much acetaminophen (Tylenol) can be harmful. · Drink plenty of fluids. Fluids may help soothe an irritated throat. Hot fluids, such as tea or soup, may help decrease throat pain. · Use over-the-counter throat lozenges to soothe pain. Regular cough drops or hard candy may also help. These should not be given to young children because of the risk of choking. · Do not smoke or allow others to smoke around you. If you need help quitting, talk to your doctor about stop-smoking programs and medicines.  These can increase your chances of quitting for good. · Use a vaporizer or humidifier to add moisture to your bedroom. Follow the directions for cleaning the machine. When should you call for help? Call your doctor now or seek immediate medical care if:    · You have new or worse trouble swallowing.     · Your sore throat gets much worse on one side.    Watch closely for changes in your health, and be sure to contact your doctor if you do not get better as expected. Where can you learn more? Go to http://raz-gabe.info/. Enter [phone] in the search box to learn more about \"Sore Throat: Care Instructions. \"  Current as of: March 28, 2018  Content Version: 11.8  © 9414-3409 [employer]. Care instructions adapted under license by [employer] (which disclaims liability or warranty for this information). If you have questions about a medical condition or this instruction, always ask your healthcare professional. [address] any warranty or liability for your use of this information.

## 2018-10-16 ENCOUNTER — LAB ONLY (OUTPATIENT)
Dept: FAMILY MEDICINE CLINIC | Age: 58
End: 2018-10-16

## 2018-10-16 DIAGNOSIS — R74.01 TRANSAMINITIS: ICD-10-CM

## 2018-10-16 NOTE — LETTER
10/18/2018 1:19 PM 
 
Ms. Nahed Corea [address] [facility] [address] [id number] Dear Nahde Corea: 
 
Please find your most recent results below. Resulted Orders METABOLIC PANEL, COMPREHENSIVE Result Value Ref Range Glucose 81 65 - 99 mg/dL BUN 8 6 - 24 mg/dL Creatinine 0.50 (L) 0.57 - 1.00 mg/dL GFR est non- >59 mL/min/1.73 GFR est  >59 mL/min/1.73  
 BUN/Creatinine ratio 16 9 - 23 Sodium 140 134 - 144 mmol/L Potassium 4.5 3.5 - 5.2 mmol/L Chloride 101 96 - 106 mmol/L  
 CO2 24 20 - 29 mmol/L Calcium 9.6 8.7 - 10.2 mg/dL Protein, total 7.1 6.0 - 8.5 g/dL Albumin 4.3 3.5 - 5.5 g/dL GLOBULIN, TOTAL 2.8 1.5 - 4.5 g/dL A-G Ratio 1.5 1.2 - 2.2 Bilirubin, total 0.3 0.0 - 1.2 mg/dL Alk. phosphatase 95 39 - 117 IU/L  
 AST (SGOT) 11 0 - 40 IU/L  
 ALT (SGPT) 10 0 - 32 IU/L Narrative Performed at:  [address]  [id number] : Belén Fields MD, Phone:  [phone] RECOMMENDATIONS: 
Your liver tests are now normal!  All of your labs look very good. Please call me if you have any questions: [phone] Sincerely, [facility]

## 2018-10-16 NOTE — PROGRESS NOTES
I reviewed with the resident the medical history and the resident's findings on the physical examination. I discussed with the resident the patient's diagnosis and concur with the plan.   Pt seen with Dr Jeevan Dewitt

## 2018-10-17 LAB
ALBUMIN SERPL-MCNC: 4.3 G/DL (ref 3.5–5.5)
ALBUMIN/GLOB SERPL: 1.5 {RATIO} (ref 1.2–2.2)
ALP SERPL-CCNC: 95 IU/L (ref 39–117)
ALT SERPL-CCNC: 10 IU/L (ref 0–32)
AST SERPL-CCNC: 11 IU/L (ref 0–40)
BILIRUB SERPL-MCNC: 0.3 MG/DL (ref 0–1.2)
BUN SERPL-MCNC: 8 MG/DL (ref 6–24)
BUN/CREAT SERPL: 16 (ref 9–23)
CALCIUM SERPL-MCNC: 9.6 MG/DL (ref 8.7–10.2)
CHLORIDE SERPL-SCNC: 101 MMOL/L (ref 96–106)
CO2 SERPL-SCNC: 24 MMOL/L (ref 20–29)
CREAT SERPL-MCNC: 0.5 MG/DL (ref 0.57–1)
GLOBULIN SER CALC-MCNC: 2.8 G/DL (ref 1.5–4.5)
GLUCOSE SERPL-MCNC: 81 MG/DL (ref 65–99)
POTASSIUM SERPL-SCNC: 4.5 MMOL/L (ref 3.5–5.2)
PROT SERPL-MCNC: 7.1 G/DL (ref 6–8.5)
SODIUM SERPL-SCNC: 140 MMOL/L (ref 134–144)

## 2019-01-10 ENCOUNTER — OFFICE VISIT (OUTPATIENT)
Dept: RHEUMATOLOGY | Age: 59
End: 2019-01-10

## 2019-01-10 VITALS
WEIGHT: 133 LBS | SYSTOLIC BLOOD PRESSURE: 118 MMHG | HEIGHT: 63 IN | HEART RATE: 86 BPM | DIASTOLIC BLOOD PRESSURE: 70 MMHG | TEMPERATURE: 98.5 F | RESPIRATION RATE: 18 BRPM | BODY MASS INDEX: 23.57 KG/M2

## 2019-01-10 DIAGNOSIS — K76.0 HEPATIC STEATOSIS: ICD-10-CM

## 2019-01-10 DIAGNOSIS — M06.9 RHEUMATOID ARTHRITIS WITH UNKNOWN RHEUMATOID FACTOR STATUS (HCC): Primary | ICD-10-CM

## 2019-01-10 DIAGNOSIS — Z86.2 HISTORY OF SARCOIDOSIS: ICD-10-CM

## 2019-01-10 DIAGNOSIS — G56.02 CARPAL TUNNEL SYNDROME OF LEFT WRIST: ICD-10-CM

## 2019-01-10 DIAGNOSIS — M65.9 STENOSING TENOSYNOVITIS: ICD-10-CM

## 2019-01-10 RX ORDER — PREDNISONE 5 MG/1
TABLET ORAL
Qty: 91 TAB | Refills: 0 | Status: SHIPPED | OUTPATIENT
Start: 2019-01-10 | End: 2019-02-09

## 2019-01-10 RX ORDER — FOLIC ACID 1 MG/1
1 TABLET ORAL DAILY
Qty: 90 TAB | Refills: 0 | Status: SHIPPED | OUTPATIENT
Start: 2019-01-10 | End: 2019-04-01 | Stop reason: SDUPTHER

## 2019-01-10 RX ORDER — METHOTREXATE 2.5 MG/1
15 TABLET ORAL
Qty: 72 TAB | Refills: 0 | Status: SHIPPED | OUTPATIENT
Start: 2019-01-12 | End: 2019-03-15 | Stop reason: SINTOL

## 2019-01-10 NOTE — PROGRESS NOTES
REASON FOR VISIT This is the initial evaluation for Ms. Chavez a 62 y.o.  female for question of an inflammatory arthritis. The patient is referred to the [facility] at the request of Dr. Debra Fair. 
  
Jan Reeves I have reviewed and summarized old records from [facility] and [facility] ([address] [place] [address]) Her mother has Rheumatoid Arthritis. Her brother has sarcoidosis. She quit smoking 5 years ago. In 1981, she had sarcoidosis diagnosed by lung biopsy and treated with prednisone. In 12/05/2017, chest radiograph showed clear lungs. The cardiac and mediastinal contours and pulmonary vascularity are normal.  The bones and soft tissues are within normal limits.  
  
In 3/2018, she developed locking of her right 3rd digit that eventually involved other fingers. She has a hard time bending her fingers and notes swelling. She feels weakness in her fingers and if she tries to bend them, they lock. She also developed left wrist pain. She was evaluated by orthopedics and was diagnosed with Fe Sole In 9/16/2018, CT Abdomen and Pelvis with contrast showed there is no inflammation, ascites, pneumoperitoneum or significant adenopathy. Liver is fatty without focal lesion. Bile ducts are not enlarged. Gallbladder is unremarkable by CT. Pancreas shows no mass or inflammation. Spleen is unremarkable. Adrenal glands are normal in size. Kidneys show no mass or hydronephrosis. Aorta is without aneurysm. The appendix is normal. Bowels are not dilated. The bladder is not distended. The distal ureters are not dilated. There is no apparent pelvic mass. In 9/18/2018, labs showed negative JAMESON IFA. In 10/16/2018, labs showed creatinine 0.50 mg/dL, eGFR 107, albumin 4.3 g/dL, ALK 95 U/L, ALT 10 U/L, AST 11 U/L. Today, she complains of sore pain and swelling in her hands, in particular in her pads, associated with stiffness lasting hours.  Her pain is constant and lasts all day. She has weakness. Heat allows her fingers to move better but then her fingers lock up which become very painful. She avoids NSAIDs due to fatty liver. Use makes them worse. She is a  and ornament glass . Therapy History includes: 
 
Current DMARD therapy includes: none Prior DMARD therapy includes: none The following DMARDs have been ineffective: none The following DMARDs were stopped because of side effects: none REVIEW OF SYSTEMS A 15 point review of systems was performed and summarized below. The questionnaire was reviewed with the patient and scanned into the patient's medical record. General: endorses recent weight loss, denies recent weight gain, fatigue, weakness, fever, night sweats Musculoskeletal: endorses joint pain, joint swelling, morning stiffness (lasting hours minutes), denies muscle pain Ears: denies ringing in ears, loss of hearing, deafness Eyes: denies pain, redness, loss of vision, double vision, blurred vision, dryness, foreign body sensation Mouth: denies sore tongue, oral ulcers, bleeding gums, loss of taste, dryness, increased dental caries Nose: denies nosebleeds, loss of smell, nasal ulcers Throat: denies frequent sore throats, hoarseness, difficulty in swallowing, pain in jaw while chewing Neck: denies swollen glands, tender glands Cardiopulmonary: denies pain in chest, irregular heart beat, sudden changes in heart beat, shortness of breath, difficulty breathing at night, dry cough, productive cough, coughing of blood, wheezing Gastrointestinal: denies nausea, heartburn, stomach pain relieved by food, vomiting of blood/\"coffee grounds\", jaundice, increasing constipation, persistent diarrhea, blood in stools, black stools Genitourinary: denies nocturia, difficult urination, pain or burning on urination, blood in urine, cloudy urine, pus in urine, genital discharge, frequent urination, vaginal dryness, rash/ulcers, sexual difficulties Hematologic: denies anemia, bleeding tendency, blood clots Skin: denies easy bruising, sun sensitive, rash, redness, hives, skin tightness, nodules/bumps, hair loss, color changes of hands or feet in the cold (Raynaud's) Neurologic: endorses numbness or tingling in hands, denies headaches, dizziness, muscle weakness, memory loss Psychiatric: denies depression, excessive worries, PTSD, Bipolar Sleep: endorses snoring, denies poor sleep (5-6 hours), apnea, daytime somnolence, difficulty falling asleep, difficulty staying asleep PAST MEDICAL HISTORY She has a past medical history of Diabetes ([address] Neurological disorder, Sarcoidosis, Stickler's syndrome, and Strabismus. FAMILY HISTORY Her family history includes Arthritis-rheumatoid in her mother; Breast Cancer in her sister; Cancer in her father; Heart Attack in her father; Other in her brother. SOCIAL HISTORY She reports that she has quit smoking. she has never used smokeless tobacco. She reports that she does not drink alcohol or use drugs. GYNECOLOGIC HISTORY  7, Para 3, Living 3, Miscarriage 4; 3 @ 8-12 weeks and 1 at 22 weeks. She denies severe pre-eclampsia, eclampsia or placental insufficiency HEALTH MAINTENANCE Immunizations Immunization History Administered Date(s) Administered  Tdap 2017 Age Appropriate Cancer Screening Colonoscopy: N/A 
PAP Smear: 2018 Mammogram: 2018 MEDICATIONS Current Outpatient Medications Medication Sig Dispense Refill  [START ON 2019] methotrexate (RHEUMATREX) 2.5 mg tablet Take 6 Tabs by mouth Every Saturday. 72 Tab 0  
 folic acid (FOLVITE) 1 mg tablet Take 1 Tab by mouth daily. 90 Tab 0  predniSONE (DELTASONE) 5 mg tablet 4 tabs daily for 7 days, 3 tabs for 7 days, 2 tabs for 14 days, 1 tab for 14 days 91 Tab 0  
  omeprazole (PRILOSEC) 20 mg capsule Take 20 mg by mouth daily.  metFORMIN ER (GLUCOPHAGE XR) 500 mg tablet Take 1 Tab by mouth two (2) times daily (with meals). 180 Tab 3 ALLERGIES Allergies Allergen Reactions  Latex Itching  
  inflammation  Augmentin [Amoxicillin-Pot Clavulanate] Hives  Keflex [Cephalexin] Other (comments) Elevated LFT's PHYSICAL EXAMINATION Visit Vitals /70 Pulse 86 Temp 98.5 °F (36.9 °C) Resp 18 Ht 5' 3\" (1.6 m) Wt 133 lb (60.3 kg) BMI 23.56 kg/m² Body mass index is 23.56 kg/m². General: Patient is alert, oriented x 3, not in acute distress HEENT:  
Conjunctiva are not injected and appear moist, oral mucous membranes are moist, there are no ulcers present, there is no alopecia, neck is supple, there is no lymphadenopathy. Salivary glands are normal 
 
Cardiovascular: 
Heart is regular rate and rhythm, no murmurs. Chest: 
Lungs are clear to auscultation bilaterally. Extremities: 
Free of clubbing, cyanosis, edema, extremities well perfused. Neurological exam: Muscle strength is full in upper and lower extremities. LEFT positive Tinel's 
 
Skin exam: 
There are no rashes, no tophi, no psoriasis, no active Raynaud's, no livedo reticularis, no periungual erythema. Surgical scar on volar right wrist s/p fracture and surgical repair Musculoskeletal exam: A comprehensive musculoskeletal exam was performed for all joints of each upper and lower extremity and assessed for swelling, tenderness and range of motion. Pertinent results are documented as below: 
 
Tenderness on the ulnar aspect of the left wrist 
Stenosing tenosynitis with pain: LEFT: 4th; RIGHT: 2nd, 3rd Z-Deformities:   no 
[place] Neck Deformities:  no 
Boutonierre's Deformities:  no 
Ulnar Deviation:   no 
MCP Subluxation:  no 
 
Joint Count 1/10/2019 Patient pain (0-100) 90 MHAQ 0.25 Left wrist- Tender 1 Left wrist- Swollen 1  
 Tender Joint Count (Total) 1 Swollen Joint Count (Total) 1 Physician Assessment (0-10) 1 Patient Assessment (0-10) 8.5 CDAI Total (calculated) 11.5 DATA REVIEW Prior medical records were reviewed and are summarized as below: 
 
Laboratory data: summarized in the HPI Imaging: summarized in the HPI. ASSESSMENT AND PLAN 
 
1) Likely Rheumatoid Arthritis. Most of her complaints are A1 pulley stenosing tenosynovitis. She does not have left wrist synovitis, suggesting Rheumatoid Arthritis. She has a history of sarcoidosis which can cause arthropathy. I will start her on a short course of prednisone, called a prednisone taper, with 5 mg tablets. This regimen is to be taken as follows: 4 tabs (20 mg) for 7 days, 3 tabs (15 mg) for 7 days, 2 tabs (10 mg) for 14 days and then 1 tab (5 mg) for 14 days, and then stop. We discussed initiation of DMARD therapy with methotrexate, which is first line therapy in Rheumatoid Arthritis. It is a weekly regimen that is supplemented with daily folic acid to help counteract potential side effects. I discussed with the patient the potential adverse effects, which include: nausea, vomiting, dyspepsia, oral ulcers, hair thinning, infection, liver function abnormalities, blood count abnormalities, and rarely pneumonitis or melanoma. The patient understood these possible adverse effects. I informed the patient of the need for routine CBC and CMP as a measure for long term use of immunosuppressants. I also instructed the patient to avoid ill contacts and the needs for annual influenza vaccines and the pneumonia vaccines. I asked the patient to apply SPF 50 sunscreen when out in the sun. The patient was also instructed to avoid alcohol as it may hasten hepatotoxicity. In childbearing patients, pregnancy is contra-indicated while on methotrexate due to risk for birth defects and early termination.  I prescribed methotrexate 15 mg every Saturday with daily folic acid 1 mg. I informed the patient that methotrexate may take 6 to 12 weeks to be effective. Patient was informed to contact me if they develop any adverse reaction or infection. I ordered labs and radiographs today. Her most recent chest radiograph was 12/2017 which was negative. I also asked her to have a colonoscopy to r/o a paraneoplastic phenomenon. 2) History of Sarcoidosis. This diagnosed by tissue and treated with prednisone. 3) Left Carpal Tunnel Syndrome. This is likely from #1. 4) Hepatic Steatosis. This was seen on imaging. I will use caution while on methotrexate. The patient voiced understanding of the aforementioned assessment and plan. Summary of plan was provided in the After Visit Summary patient instructions. I also provided education about MyChart setup and utility. TODAY'S ORDERS Orders Placed This Encounter  QUANTIFERON-TB GOLD PLUS  
 XR FOOT LT MIN 3 V  
 XR FOOT RT MIN 3 V  
 XR HAND LT MIN 3 V  
 XR HAND RT MIN 3 V  
 CYCLIC CITRUL PEPTIDE AB, IGG  
 CBC WITH AUTOMATED DIFF  
 CHRONIC HEPATITIS PANEL  
 METABOLIC PANEL, COMPREHENSIVE  C REACTIVE PROTEIN, QT  
 SED RATE (ESR)  RHEUMATOID FACTOR, QL  
 PROTEIN ELECTROPHORESIS W/ REFLX GRETTA  
 URIC ACID  VITAMIN D, 25 HYDROXY  methotrexate (RHEUMATREX) 2.5 mg tablet  folic acid (FOLVITE) 1 mg tablet  predniSONE (DELTASONE) 5 mg tablet Future Appointments Date Time Provider Jorden Singleton 2/8/2019  9:40 AM Holly Peterson MD [address] [facility] Cady Ellis MD, [facility] Adult Rheumatology Rheumatology Ultrasound Certified Chris Schulte A Part of [facility], [address] [facility] Phone [phone] Fax [phone]

## 2019-01-10 NOTE — PATIENT INSTRUCTIONS
I will start you on a short course of prednisone, called a prednisone taper, with 5 mg tablets. This regimen is to be taken as follows:  
 
 - 4 tablets (20 mg), all at once, daily for 7 days - 3 tablets (15 mg), all at once, daily for 7 days - 2 tablets (10 mg), all at once, daily for 14 days - 1 tablet (5 mg), daily for 14 days - then STOP METHOTREXATE Methotrexate is a weekly regimen that is supplemented with daily folic acid to help counteract potential side effects. Potential Adverse Affects 
 
- Nausea - Vomiting - Upset stomach 
- Oral ulcers 
- Hair thinning - Infection - Liver function abnormalities - Blood count abnormalities - Rarely pneumonitis Medication Monitoring You will need routine blood counts and kidney and liver testing as a measure of monitoring for long term use of immunosuppressants. Things You Should Do You should avoid ill contacts You should get annual influenza vaccines and the pneumonia vaccines. You should apply SPF 50 sunscreen when out in the sun. You should avoid alcohol as it may hasten hepatotoxicity. You should avoid pregnancy due to risk for birth defects. You should contact me if you are sick. You should hold methotrexate if you are sick and resume after your sickness resolves. If you have any problems or side effects with this medication, please contact me immediately to inform me. Plan I prescribed methotrexate 15 mg (6 tablets) every SATURDAY at bedtime with DAILY folic acid 1 mg. If the folic acid 1 mg is not covered, then you may take two tablets of the over the counter [employer] Made folic acid 697 mcg (total of 800 mcg daily). Methotrexate may take 6 to 12 weeks to be effective. Please get a colonoscopy

## 2019-01-11 DIAGNOSIS — R74.01 TRANSAMINITIS: Primary | ICD-10-CM

## 2019-01-14 LAB
25(OH)D3+25(OH)D2 SERPL-MCNC: 10 NG/ML (ref 30–100)
ALBUMIN SERPL ELPH-MCNC: 4.3 G/DL (ref 2.9–4.4)
ALBUMIN SERPL-MCNC: 4.7 G/DL (ref 3.5–5.5)
ALBUMIN/GLOB SERPL: 1.4 {RATIO} (ref 0.7–1.7)
ALBUMIN/GLOB SERPL: 1.7 {RATIO} (ref 1.2–2.2)
ALP SERPL-CCNC: 70 IU/L (ref 39–117)
ALPHA1 GLOB SERPL ELPH-MCNC: 0.2 G/DL (ref 0–0.4)
ALPHA2 GLOB SERPL ELPH-MCNC: 0.6 G/DL (ref 0.4–1)
ALT SERPL-CCNC: 7 IU/L (ref 0–32)
AST SERPL-CCNC: 12 IU/L (ref 0–40)
B-GLOBULIN SERPL ELPH-MCNC: 1.1 G/DL (ref 0.7–1.3)
BASOPHILS # BLD AUTO: 0.1 X10E3/UL (ref 0–0.2)
BASOPHILS NFR BLD AUTO: 1 %
BILIRUB SERPL-MCNC: 0.5 MG/DL (ref 0–1.2)
BUN SERPL-MCNC: 12 MG/DL (ref 6–24)
BUN/CREAT SERPL: 23 (ref 9–23)
CALCIUM SERPL-MCNC: 9.9 MG/DL (ref 8.7–10.2)
CCP IGA+IGG SERPL IA-ACNC: 12 UNITS (ref 0–19)
CHLORIDE SERPL-SCNC: 102 MMOL/L (ref 96–106)
CO2 SERPL-SCNC: 24 MMOL/L (ref 20–29)
COMMENT, 144067: NORMAL
CREAT SERPL-MCNC: 0.53 MG/DL (ref 0.57–1)
CRP SERPL-MCNC: <0.3 MG/L (ref 0–4.9)
EOSINOPHIL # BLD AUTO: 0.1 X10E3/UL (ref 0–0.4)
EOSINOPHIL NFR BLD AUTO: 2 %
ERYTHROCYTE [DISTWIDTH] IN BLOOD BY AUTOMATED COUNT: 13.9 % (ref 12.3–15.4)
ERYTHROCYTE [SEDIMENTATION RATE] IN BLOOD BY WESTERGREN METHOD: 5 MM/HR (ref 0–40)
GAMMA GLOB SERPL ELPH-MCNC: 1.2 G/DL (ref 0.4–1.8)
GAMMA INTERFERON BACKGROUND BLD IA-ACNC: 0.11 IU/ML
GLOBULIN SER CALC-MCNC: 2.7 G/DL (ref 1.5–4.5)
GLOBULIN SER CALC-MCNC: 3.1 G/DL (ref 2.2–3.9)
GLUCOSE SERPL-MCNC: 107 MG/DL (ref 65–99)
HBV CORE AB SERPL QL IA: NEGATIVE
HBV CORE IGM SERPL QL IA: NEGATIVE
HBV E AB SERPL QL IA: NEGATIVE
HBV E AG SERPL QL IA: NEGATIVE
HBV SURFACE AB SER QL: NON REACTIVE
HBV SURFACE AG SERPL QL IA: NEGATIVE
HCT VFR BLD AUTO: 42.3 % (ref 34–46.6)
HCV AB S/CO SERPL IA: <0.1 S/CO RATIO (ref 0–0.9)
HGB BLD-MCNC: 14.3 G/DL (ref 11.1–15.9)
IMM GRANULOCYTES # BLD AUTO: 0 X10E3/UL (ref 0–0.1)
IMM GRANULOCYTES NFR BLD AUTO: 0 %
LYMPHOCYTES # BLD AUTO: 2.4 X10E3/UL (ref 0.7–3.1)
LYMPHOCYTES NFR BLD AUTO: 44 %
M PROTEIN SERPL ELPH-MCNC: NORMAL G/DL
M TB IFN-G BLD-IMP: NEGATIVE
M TB IFN-G CD4+ BCKGRND COR BLD-ACNC: 0.04 IU/ML
MCH RBC QN AUTO: 28.7 PG (ref 26.6–33)
MCHC RBC AUTO-ENTMCNC: 33.8 G/DL (ref 31.5–35.7)
MCV RBC AUTO: 85 FL (ref 79–97)
MITOGEN IGNF BLD-ACNC: >10 IU/ML
MONOCYTES # BLD AUTO: 0.4 X10E3/UL (ref 0.1–0.9)
MONOCYTES NFR BLD AUTO: 7 %
NEUTROPHILS # BLD AUTO: 2.6 X10E3/UL (ref 1.4–7)
NEUTROPHILS NFR BLD AUTO: 46 %
PLATELET # BLD AUTO: 214 X10E3/UL (ref 150–379)
PLEASE NOTE, 011150: NORMAL
POTASSIUM SERPL-SCNC: 4.1 MMOL/L (ref 3.5–5.2)
PROT PATTERN SERPL ELPH-IMP: NORMAL
PROT SERPL-MCNC: 7.4 G/DL (ref 6–8.5)
QUANTIFERON INCUBATION, QF1T: NORMAL
QUANTIFERON TB2 AG: 0.03 IU/ML
RBC # BLD AUTO: 4.98 X10E6/UL (ref 3.77–5.28)
RHEUMATOID FACT SERPL-ACNC: <10 IU/ML (ref 0–13.9)
SERVICE CMNT-IMP: NORMAL
SODIUM SERPL-SCNC: 141 MMOL/L (ref 134–144)
URATE SERPL-MCNC: 3.6 MG/DL (ref 2.5–7.1)
WBC # BLD AUTO: 5.5 X10E3/UL (ref 3.4–10.8)

## 2019-01-15 RX ORDER — ERGOCALCIFEROL 1.25 MG/1
50000 CAPSULE ORAL
Qty: 12 CAP | Refills: 4 | Status: SHIPPED | OUTPATIENT
Start: 2019-01-15 | End: 2019-12-18

## 2019-02-08 ENCOUNTER — OFFICE VISIT (OUTPATIENT)
Dept: RHEUMATOLOGY | Age: 59
End: 2019-02-08

## 2019-02-08 VITALS
HEART RATE: 85 BPM | HEIGHT: 63 IN | RESPIRATION RATE: 18 BRPM | DIASTOLIC BLOOD PRESSURE: 80 MMHG | BODY MASS INDEX: 23.57 KG/M2 | WEIGHT: 133 LBS | TEMPERATURE: 97.9 F | SYSTOLIC BLOOD PRESSURE: 135 MMHG

## 2019-02-08 DIAGNOSIS — M06.09 SERONEGATIVE RHEUMATOID ARTHRITIS OF MULTIPLE SITES (HCC): Primary | ICD-10-CM

## 2019-02-08 DIAGNOSIS — M65.9 STENOSING TENOSYNOVITIS: ICD-10-CM

## 2019-02-08 DIAGNOSIS — Z79.60 LONG-TERM USE OF IMMUNOSUPPRESSANT MEDICATION: ICD-10-CM

## 2019-02-08 DIAGNOSIS — Z86.2 HISTORY OF SARCOIDOSIS: ICD-10-CM

## 2019-02-08 DIAGNOSIS — E55.9 VITAMIN D DEFICIENCY: ICD-10-CM

## 2019-02-08 NOTE — PATIENT INSTRUCTIONS
Please start methotrexate as prescribed. Will check your liver enzyme every 7 days to monitor your liver function

## 2019-02-08 NOTE — PROGRESS NOTES
REASON FOR VISIT This is a follow-up visit for Ms. Chavez for Seronegative Rheumatoid Arthritis. Inflammatory arthritis phenotype includes: Anti-CCP positive: no 
Rheumatoid factor positive: no 
Erosive disease: no 
Extra-articular manifestations include: sarcoidosis Immunosuppression Screening (1/10/2019): Quantiferon TB: negative PPD:  Not performed Hepatitis B: negative Hepatitis C: negative Therapy History includes: 
Current DMARD therapy include: none Prior DMARD therapy include: none Discontinued DMARDs because of inefficacy: None Discontinued DMARDs because of side effects: None Immunization History Administered Date(s) Administered  Tdap 12/11/2017 Patient Active Problem List  
Diagnosis Code  Distal radius fracture S52.509A  Transaminitis R74.0  Acute hepatitis B17.9  Hepatic steatosis K76.0  Stenosing tenosynovitis M65.9  
 History of sarcoidosis Z86.2  Carpal tunnel syndrome of left wrist G56.02  
 Seronegative rheumatoid arthritis of multiple sites (HCC) M06.09  
 Vitamin D deficiency E55.9 HISTORY OF PRESENT ILLNESS 
 
Ms. Lizet Ann returns for a follow-up. On her last visit, I ordered labs and radiographs and I prescribed methotrexate 15 mg every Saturday with daily folic acid 1 mg and a prednisone taper due to onset of Rheumatoid Arthritis. She did not start methotrexate due to fear of side effects. She messaged me on MyChart and we discussed this. She also did not want to start ergocalciferol because her sister developed liver toxicity from it. Today, she feels better. She no longer has wrist pain. 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 She is on prednisone 5 mg daily. She has mild swelling in her hands and stiffness in her hands lasting hours.   
 
She denies fever, weight loss, blurred vision, vision loss, oral ulcers, ankle swelling, dry cough, dyspnea, nausea, vomiting, dysphagia, abdominal pain, black or bloody stool, fall since last visit, rash, easy bruising and increased thirst. 
 
Ms. Genaro Muñoz has continued her medications for arthritis and reports good tolerance without significant side effects. Last toxicity monitoring by blood work was done on 1/10/2019 and did not reveal any significant adverse effects. Most recent inflammatory markers from 1/10/2019 revealed a ESR 5 mm/hr (previously N/A mm/hr) and CRP 0.3 mg/L (previously N/A mg/L). The patient has not had any interval hospital admissions, infections, or surgeries. REVIEW OF SYSTEMS A comprehensive review of systems was performed and pertinent results are documented in the HPI, review of systems is otherwise non-contributory. PAST MEDICAL HISTORY She has a past medical history of Diabetes ([address] Neurological disorder, Sarcoidosis, Stickler's syndrome, and Strabismus. FAMILY HISTORY Her family history includes Arthritis-rheumatoid in her mother; Breast Cancer in her sister; Cancer in her father; Heart Attack in her father; Other in her brother. SOCIAL HISTORY She reports that she has quit smoking. she has never used smokeless tobacco. She reports that she does not drink alcohol or use drugs. MEDICATIONS Current Outpatient Medications Medication Sig Dispense Refill  ergocalciferol (ERGOCALCIFEROL) 50,000 unit capsule Take 1 Cap by mouth every seven (7) days. 12 Cap 4  
 folic acid (FOLVITE) 1 mg tablet Take 1 Tab by mouth daily. 90 Tab 0  predniSONE (DELTASONE) 5 mg tablet 4 tabs daily for 7 days, 3 tabs for 7 days, 2 tabs for 14 days, 1 tab for 14 days 91 Tab 0  
 omeprazole (PRILOSEC) 20 mg capsule Take 20 mg by mouth daily.  metFORMIN ER (GLUCOPHAGE XR) 500 mg tablet Take 1 Tab by mouth two (2) times daily (with meals). (Patient taking differently: Take 500 mg by mouth two (2) times daily (with meals).  Takes 2 tablets at night and 1 tablet in morning) 180 Tab 3  
  methotrexate (RHEUMATREX) 2.5 mg tablet Take 6 Tabs by mouth Every Saturday. 72 Tab 0 ALLERGIES Allergies Allergen Reactions  Latex Itching  
  inflammation  Augmentin [Amoxicillin-Pot Clavulanate] Hives  Keflex [Cephalexin] Other (comments) Elevated LFT's PHYSICAL EXAMINATION Visit Vitals /80 Pulse 85 Temp 97.9 °F (36.6 °C) Resp 18 Ht 5' 3\" (1.6 m) Wt 133 lb (60.3 kg) BMI 23.56 kg/m² Body mass index is 23.56 kg/m². General: Patient is alert, oriented x 3, not in acute distress HEENT:  
Sclerae are not injected and appear moist. 
There is no alopecia. Neck is supple Cardiovascular: 
Heart is regular rate and rhythm, no murmurs. Chest: 
Lungs are clear to auscultation bilaterally. No rhonchi, wheezes, or crackles. Extremities: 
Free of clubbing, cyanosis, edema Neurological exam: Muscle strength is full in upper and lower extremities. LEFT positive Tinel's 
 
Skin exam: 
There are no rashes, no alopecia, no discoid lesions, no active Raynaud's, no livedo reticularis, no periungual erythema. Surgical scar on volar right wrist s/p fracture and surgical repair Musculoskeletal exam: A comprehensive musculoskeletal exam was NOT performed for all joints of each upper and lower extremity and assessed for swelling, tenderness and range of motion. Positive results are documented as below: 
 
Previous Exam 
 
Tenderness on the ulnar aspect of the left wrist 
Stenosing tenosynitis with pain: LEFT: 4th; RIGHT: 2nd, 3rd Z-Deformities:   no 
[place] Neck Deformities:  no 
Boutonierre's Deformities:  no 
Ulnar Deviation:   no 
MCP Subluxation:  no 
  
Joint Count 2/8/2019 1/10/2019 Patient pain (0-100) 5 90 MHAQ 0 0.25 Left wrist- Tender - 1 Left wrist- Swollen - 1 Tender Joint Count (Total) - 1 Swollen Joint Count (Total) - 1 Physician Assessment (0-10) - 1 Patient Assessment (0-10) 0.5 8.5 CDAI Total (calculated) - 11.5 DATA REVIEW Laboratory Recent laboratory results were reviewed, summarized, and discussed with the patient. Imaging Musculoskeletal Ultrasound None Radiographs Bilateral Hand 1/10/2019: RIGHT: The patient is status post ORIF of the distal radius with a volar plate and several screws. The hardware is intact. The bone is healed. No acute fracture or dislocation. No significant arthritis. No erosions. The soft tissues appear unremarkable. LEFT: no fracture, dislocation or other acute osseous or articular abnormality. The soft tissues are within normal limits. Bilateral Foot 1/10/2019: RIGHT: No acute fracture or dislocation. No significant arthritis. There is a type II accessory navicular bone. There is a plantar calcaneal enthesophyte. LEFT: No acute fracture or dislocation. No significant arthritis. There is a type II accessory navicular bone. There is a plantar calcaneal enthesophyte. 
  
Chest 12/05/2017: clear lungs. The cardiac and mediastinal contours and pulmonary vascularity are normal.  The bones and soft tissues are within normal limits. CT Imaging CT Abdomen and Pelvis with contrast 9/16/2018: there is no inflammation, ascites, pneumoperitoneum or significant adenopathy. Liver is fatty without focal lesion. Bile ducts are not enlarged. Gallbladder is unremarkable by CT. Pancreas shows no mass or inflammation. Spleen is unremarkable. Adrenal glands are normal in size. Kidneys show no mass or hydronephrosis. Aorta is without aneurysm. The appendix is normal. Bowels are not dilated. The bladder is not distended. The distal ureters are not dilated. There is no apparent pelvic mass. MR Imaging None DXA None ASSESSMENT AND PLAN This is a follow-up visit for Ms. Chavez. 1) Seronegative Rheumatoid Arthritis. Most of her complaints are A1 pulley stenosing tenosynovitis.  She does not have left wrist synovitis, suggesting Rheumatoid Arthritis. She has a history of sarcoidosis which can cause arthropathy. She felt better on prednisone. She did not start methotrexate due to fear of side effects. We reviewed the risks and having more frequent liver function tests monitoring of weekly to evaluate for liver elevations. She was amenable. 2) History of Sarcoidosis. This diagnosed by tissue and treated with prednisone. Chest radiograph in 12/2017 was negative. 3) Left Carpal Tunnel Syndrome. This is likely from #1. 4) Hepatic Steatosis. This was seen on imaging. I will use caution while on methotrexate. Her liver function tests were normal. 
 
5) Vitamin D Deficiency. Her vitamin D level was 10.0. I prescribed weekly ergocalciferol 50,000 but she did not start it due to fear of liver function tests elevation. I asked her to start it. The patient voiced understanding of the aforementioned assessment and plan. Summary of plan was provided in the After Visit Summary patient instructions. TODAY'S ORDERS Orders Placed This Encounter  HEPATIC FUNCTION PANEL Future Appointments Date Time Provider Jorden Areli 3/22/2019  9:40 AM Stefan Peterson Cera, MD [address] [facility] Emily Campos MD, [facility] Adult Rheumatology Rheumatology Ultrasound Certified Chris Schulte A Part of [facility], [address] [facility] [address] Phone [phone] Fax [phone]

## 2019-02-13 ENCOUNTER — LAB ONLY (OUTPATIENT)
Dept: FAMILY MEDICINE CLINIC | Age: 59
End: 2019-02-13

## 2019-02-13 DIAGNOSIS — R74.01 TRANSAMINITIS: ICD-10-CM

## 2019-02-14 LAB
ALBUMIN SERPL-MCNC: 4.7 G/DL (ref 3.5–5.5)
ALBUMIN/GLOB SERPL: 1.9 {RATIO} (ref 1.2–2.2)
ALP SERPL-CCNC: 73 IU/L (ref 39–117)
ALT SERPL-CCNC: 10 IU/L (ref 0–32)
AST SERPL-CCNC: 11 IU/L (ref 0–40)
BILIRUB SERPL-MCNC: 0.3 MG/DL (ref 0–1.2)
BUN SERPL-MCNC: 13 MG/DL (ref 6–24)
BUN/CREAT SERPL: 25 (ref 9–23)
CALCIUM SERPL-MCNC: 9.9 MG/DL (ref 8.7–10.2)
CHLORIDE SERPL-SCNC: 101 MMOL/L (ref 96–106)
CO2 SERPL-SCNC: 22 MMOL/L (ref 20–29)
CREAT SERPL-MCNC: 0.53 MG/DL (ref 0.57–1)
GLOBULIN SER CALC-MCNC: 2.5 G/DL (ref 1.5–4.5)
GLUCOSE SERPL-MCNC: 86 MG/DL (ref 65–99)
POTASSIUM SERPL-SCNC: 4.4 MMOL/L (ref 3.5–5.2)
PROT SERPL-MCNC: 7.2 G/DL (ref 6–8.5)
SODIUM SERPL-SCNC: 141 MMOL/L (ref 134–144)

## 2019-02-20 ENCOUNTER — PATIENT MESSAGE (OUTPATIENT)
Dept: RHEUMATOLOGY | Age: 59
End: 2019-02-20

## 2019-02-20 DIAGNOSIS — R79.89 LFT ELEVATION: Primary | ICD-10-CM

## 2019-02-21 LAB
ALBUMIN SERPL-MCNC: 4.9 G/DL (ref 3.5–5.5)
ALP SERPL-CCNC: 71 IU/L (ref 39–117)
ALT SERPL-CCNC: 14 IU/L (ref 0–32)
AST SERPL-CCNC: 11 IU/L (ref 0–40)
BILIRUB DIRECT SERPL-MCNC: 0.12 MG/DL (ref 0–0.4)
BILIRUB SERPL-MCNC: 0.4 MG/DL (ref 0–1.2)
PROT SERPL-MCNC: 7.4 G/DL (ref 6–8.5)

## 2019-02-21 NOTE — TELEPHONE ENCOUNTER
Ahsan Jaeger MD 2/21/2019 6:27 AM EST        ----- Message -----  From: Abilio Fitch RN  Sent: 2/20/2019 3:29 PM  To: Eloina Antonio MD  Subject: Deanna Decker: Referral Request         ----- Message -----  From: Marvin Angeles  Sent: 2/20/2019 3:21 PM  To: [facility] Nurse Pool  Subject: Referral Request     ----- Message from [address] [place] [address] 951, [facility] sent at 2/20/2019 3:21 PM EST -----    Good afternoon,  I went today for my lab work this afternoon at [facility]. They took the paperwork because they said the way it was written up it could only be used once. I showed him where it said every 7 days. He said it needs to be in a different format. He suggested you call him. Arabella Driss @ [phone]   fax: [phone].   Thank you  Jhoana Segura

## 2019-02-25 ENCOUNTER — TELEPHONE (OUTPATIENT)
Dept: RHEUMATOLOGY | Age: 59
End: 2019-02-25

## 2019-02-25 NOTE — TELEPHONE ENCOUNTER
Spoke with Jo Oleary at hospitals and he stated that the spanding order for the hepatic panel needs to have a start and end date on it. I told him I would fix it and send it back. He stated an understanding.

## 2019-02-25 NOTE — TELEPHONE ENCOUNTER
Please call Milk Mantra regarding patient's lab order. Questions regarding how it was written standing order?

## 2019-02-28 LAB
ALBUMIN SERPL-MCNC: 4.9 G/DL (ref 3.5–5.5)
ALP SERPL-CCNC: 79 IU/L (ref 39–117)
ALT SERPL-CCNC: 13 IU/L (ref 0–32)
AST SERPL-CCNC: 15 IU/L (ref 0–40)
BILIRUB DIRECT SERPL-MCNC: 0.11 MG/DL (ref 0–0.4)
BILIRUB SERPL-MCNC: 0.4 MG/DL (ref 0–1.2)
PROT SERPL-MCNC: 7 G/DL (ref 6–8.5)

## 2019-03-08 LAB
ALBUMIN SERPL-MCNC: 4.7 G/DL (ref 3.5–5.5)
ALP SERPL-CCNC: 63 IU/L (ref 39–117)
ALT SERPL-CCNC: 19 IU/L (ref 0–32)
AST SERPL-CCNC: 18 IU/L (ref 0–40)
BILIRUB DIRECT SERPL-MCNC: 0.14 MG/DL (ref 0–0.4)
BILIRUB SERPL-MCNC: 0.5 MG/DL (ref 0–1.2)
PROT SERPL-MCNC: 7.4 G/DL (ref 6–8.5)

## 2019-03-14 LAB
ALBUMIN SERPL-MCNC: 4.9 G/DL (ref 3.5–5.5)
ALP SERPL-CCNC: 67 IU/L (ref 39–117)
ALT SERPL-CCNC: 16 IU/L (ref 0–32)
AST SERPL-CCNC: 13 IU/L (ref 0–40)
BILIRUB DIRECT SERPL-MCNC: 0.13 MG/DL (ref 0–0.4)
BILIRUB SERPL-MCNC: 0.5 MG/DL (ref 0–1.2)
PROT SERPL-MCNC: 7.5 G/DL (ref 6–8.5)

## 2019-03-15 ENCOUNTER — OFFICE VISIT (OUTPATIENT)
Dept: RHEUMATOLOGY | Age: 59
End: 2019-03-15

## 2019-03-15 VITALS
WEIGHT: 134 LBS | RESPIRATION RATE: 18 BRPM | BODY MASS INDEX: 23.74 KG/M2 | DIASTOLIC BLOOD PRESSURE: 81 MMHG | TEMPERATURE: 98.1 F | HEART RATE: 94 BPM | HEIGHT: 63 IN | SYSTOLIC BLOOD PRESSURE: 128 MMHG

## 2019-03-15 DIAGNOSIS — M06.09 SERONEGATIVE RHEUMATOID ARTHRITIS OF MULTIPLE SITES (HCC): Primary | ICD-10-CM

## 2019-03-15 DIAGNOSIS — E55.9 VITAMIN D DEFICIENCY: ICD-10-CM

## 2019-03-15 DIAGNOSIS — Z79.60 LONG-TERM USE OF IMMUNOSUPPRESSANT MEDICATION: ICD-10-CM

## 2019-03-15 RX ORDER — FOLIC ACID 1 MG/1
1 TABLET ORAL DAILY
Qty: 90 TAB | Refills: 0 | Status: CANCELLED | OUTPATIENT
Start: 2019-03-15

## 2019-03-15 RX ORDER — METHOTREXATE 2.5 MG/1
15 TABLET ORAL
Qty: 72 TAB | Refills: 0 | Status: CANCELLED | OUTPATIENT
Start: 2019-03-19

## 2019-03-15 NOTE — PROGRESS NOTES
Chief Complaint   Patient presents with    Arthritis     1. Have you been to the ER, urgent care clinic since your last visit? Hospitalized since your last visit? No    2. Have you seen or consulted any other health care providers outside of the [address] [place] [address] since your last visit? Include any pap smears or colon screening.  No

## 2019-03-15 NOTE — PROGRESS NOTES
REASON FOR VISIT    This is a follow-up visit for Ms. Chavez for Seronegative Rheumatoid Arthritis. Inflammatory arthritis phenotype includes:  Anti-CCP positive: no  Rheumatoid factor positive: no  Erosive disease: no  Extra-articular manifestations include: sarcoidosis    Immunosuppression Screening (1/10/2019): Quantiferon TB: negative  PPD:  Not performed  Hepatitis B: negative  Hepatitis C: negative    Therapy History includes:  Current DMARD therapy include: none  Prior DMARD therapy include: methotrexate 15 mg every Tuesday (2/08/2019 to 3/15/2019)  Discontinued DMARDs because of inefficacy: None  Discontinued DMARDs because of side effects: methotrexate (GI intolerance, cough)    Immunization History   Administered Date(s) Administered    Tdap 12/11/2017       Patient Active Problem List   Diagnosis Code    Distal radius fracture S52.509A    Transaminitis R74.0    Acute hepatitis B17.9    Hepatic steatosis K76.0    Stenosing tenosynovitis M65.9    History of sarcoidosis Z86.2    Carpal tunnel syndrome of left wrist G56.02    Seronegative rheumatoid arthritis of multiple sites ([facility] [address] M06.09    Vitamin D deficiency E55.9     HISTORY OF PRESENT ILLNESS    Ms. Eve Berkowitz returns for a follow-up. On her last visit, I asked her start methotrexate 15 mg every Tuesday with reassurance that I would check her liver function tests weekly, which were all normal. She reports bloating, belching, flatus and weight gain. Today, she feels worse. She has pain in her 4th digits with locking of her 2nd and 3rd digits. She also has recurrence of her wrist pain. She notes swelling in her hands. She has morning stiffness in her hands lasting hours. She feels worse after coming off prednisone. She endorses new infrequent cough and increased thirst, which started after methotrexate.      She denies fever, weight loss, blurred vision, vision loss, oral ulcers, ankle swelling, dyspnea, nausea, vomiting, dysphagia, abdominal pain, black or bloody stool, fall since last visit, rash and easy bruising. Ms. Gail Galeano has continued her medications for arthritis and reports good tolerance without significant side effects. Last toxicity monitoring by blood work was done on 1/10/2019 and did not reveal any significant adverse effects. Most recent liver function tests on 3/13/2019. Most recent inflammatory markers from 1/10/2019 revealed a ESR 5 mm/hr (previously N/A mm/hr) and CRP 0.3 mg/L (previously N/A mg/L). The patient has not had any interval hospital admissions, infections, or surgeries. REVIEW OF SYSTEMS    A comprehensive review of systems was performed and pertinent results are documented in the HPI, review of systems is otherwise non-contributory. PAST MEDICAL HISTORY    She has a past medical history of Diabetes ([address] Neurological disorder, Sarcoidosis, Stickler's syndrome, and Strabismus. FAMILY HISTORY    Her family history includes Arthritis-rheumatoid in her mother; Breast Cancer in her sister; Cancer in her father; Heart Attack in her father; Other in her brother. SOCIAL HISTORY    She reports that she has quit smoking. she has never used smokeless tobacco. She reports that she does not drink alcohol or use drugs. MEDICATIONS    Current Outpatient Medications   Medication Sig Dispense Refill    adalimumab (HUMIRA,CF, PEN) 40 mg/0.4 mL pnkt 40 mg by SubCUTAneous route every fourteen (14) days. 2 Kit 11    ergocalciferol (ERGOCALCIFEROL) 50,000 unit capsule Take 1 Cap by mouth every seven (7) days. 12 Cap 4    folic acid (FOLVITE) 1 mg tablet Take 1 Tab by mouth daily. 90 Tab 0    omeprazole (PRILOSEC) 20 mg capsule Take 20 mg by mouth daily.  metFORMIN ER (GLUCOPHAGE XR) 500 mg tablet Take 1 Tab by mouth two (2) times daily (with meals).  180 Tab 3        ALLERGIES    Allergies   Allergen Reactions    Latex Itching     inflammation    Augmentin [Amoxicillin-Pot Clavulanate] Hives    Keflex [Cephalexin] Other (comments)     Elevated LFT's       PHYSICAL EXAMINATION    Visit Vitals  /81   Pulse 94   Temp 98.1 °F (36.7 °C)   Resp 18   Ht 5' 3\" (1.6 m)   Wt 134 lb (60.8 kg)   BMI 23.74 kg/m²     Body mass index is 23.74 kg/m². General: Patient is alert, oriented x 3, not in acute distress    HEENT:   Sclerae are not injected and appear moist.  There is no alopecia. Neck is supple     Cardiovascular:  Heart is regular rate and rhythm, no murmurs. Chest:  Lungs are clear to auscultation bilaterally. No rhonchi, wheezes, or crackles. Extremities:  Free of clubbing, cyanosis, edema    Neurological exam:  Muscle strength is full in upper and lower extremities. Skin exam:  There are no rashes, no alopecia, no discoid lesions, no active Raynaud's, no livedo reticularis, no periungual erythema. Surgical scar on volar right wrist s/p fracture and surgical repair    Musculoskeletal exam:  A comprehensive musculoskeletal exam was performed for all joints of each upper and lower extremity and assessed for swelling, tenderness and range of motion. Positive results are documented as below:    Tenderness of the bilateral 2nd-5th A1 pulley    Z-Deformities:   no  [place] Neck Deformities:  no  Boutonierre's Deformities:  no  Ulnar Deviation:   no  MCP Subluxation:  no     Joint Count 3/15/2019 2/8/2019 1/10/2019   Patient pain (0-100) 80 5 90   MHAQ 0.125 0 0.25   Left wrist- Tender 0 - 1   Left wrist- Swollen 1 - 1   Right wrist- Tender 0 - -   Right wrist- Swollen 1 - -   Right 4th PIP - Tender 1 - -   Right 4th PIP - Swollen 0 - -   Tender Joint Count (Total) 1 - 1   Swollen Joint Count (Total) 2 - 1   Physician Assessment (0-10) 1 - 1   Patient Assessment (0-10) 8 0.5 8.5   CDAI Total (calculated) 12 - 11.5       DATA REVIEW    Laboratory     Recent laboratory results were reviewed, summarized, and discussed with the patient.     Imaging    Musculoskeletal Ultrasound    None    Radiographs    Bilateral Hand 1/10/2019: RIGHT: The patient is status post ORIF of the distal radius with a volar plate and several screws. The hardware is intact. The bone is healed. No acute fracture or dislocation. No significant arthritis. No erosions. The soft tissues appear unremarkable. LEFT: no fracture, dislocation or other acute osseous or articular abnormality. The soft tissues are within normal limits. Bilateral Foot 1/10/2019: RIGHT: No acute fracture or dislocation. No significant arthritis. There is a type II accessory navicular bone. There is a plantar calcaneal enthesophyte. LEFT: No acute fracture or dislocation. No significant arthritis. There is a type II accessory navicular bone. There is a plantar calcaneal enthesophyte.     Chest 12/05/2017: clear lungs. The cardiac and mediastinal contours and pulmonary vascularity are normal.  The bones and soft tissues are within normal limits. CT Imaging    CT Abdomen and Pelvis with contrast 9/16/2018: there is no inflammation, ascites, pneumoperitoneum or significant adenopathy. Liver is fatty without focal lesion. Bile ducts are not enlarged. Gallbladder is unremarkable by CT. Pancreas shows no mass or inflammation. Spleen is unremarkable. Adrenal glands are normal in size. Kidneys show no mass or hydronephrosis. Aorta is without aneurysm. The appendix is normal. Bowels are not dilated. The bladder is not distended. The distal ureters are not dilated. There is no apparent pelvic mass. MR Imaging    None    DXA     None    ASSESSMENT AND PLAN    This is a follow-up visit for Ms. Chavez. 1) Seronegative Rheumatoid Arthritis. Most of her complaints are A1 pulley stenosing tenosynovitis. She has bilateral wrist fullness. She felt better on prednisone. She did not tolerate methotrexate due to GI upset, therefore, I discontinued it.  I discussed with her about advancing therapy to a biologic (small particle versus anti-TNF). We discussed the potential adverse effects, which include infections, such as upper respiratory infections, latent TB reactivation, viral hepatitis activation, and routes of administration (oral versus infusion versus subcutaneous). The patient was informed about the low risk for lymphoma based on at least 5 years of post-market data and the likely inherent relation between chronic autoimmune diseases, such as Rheumatoid Arthritis, and lymphoma. The patient was informed these medications co-pay are subject to the patient's insurance coverage and we will not know until it has been submitted to the insurance company. I discussed giving her two samples of Humira first to determine response prior to submitting to her insurance and she was amenable. She received one dose as a teaching and one to take home. I asked her to inform me how she feels after her second dose. 2) Long Term Use of Immunosuppressants. The patient remains on immunomodulatory medications (methotrexate, Humira) and requires frequent toxicity monitoring by blood work. 3) History of Sarcoidosis. This diagnosed by tissue and treated with prednisone. Chest radiograph in 12/2017 was negative. 4) Left Carpal Tunnel Syndrome. This is likely from #1. 5) Vitamin D Deficiency. Her vitamin D level was 10.0. I prescribed weekly ergocalciferol 50,000, which she is taking with good tolerance and no effect of her liver function tests. 6) Hepatic Steatosis. This was seen on imaging. I will use caution while on methotrexate. Her liver function tests were normal.    The patient voiced understanding of the aforementioned assessment and plan. Summary of plan was provided in the After Visit Summary patient instructions.      TODAY'S ORDERS    Orders Placed This Encounter    adalimumab (HUMIRA,CF, PEN) 40 mg/0.4 mL pnkt     Future Appointments   Date Time Provider Jorden Singleton   6/17/2019 10:20 AM Josefina Luong MD [address] [facility] Srinivas Jha MD, [address] [facility]    Adult Rheumatology   Rheumatology Ultrasound Certified  [address]  [facility] [place], [address]   Phone [phone]  Fax [phone]

## 2019-03-15 NOTE — PATIENT INSTRUCTIONS
I gave you two samples of Humira. If you have any reactions or NO reactions, let me know after your second dose so I can submit to your insurance and give you more samples    Adalimumab (By injection)   Adalimumab ([id number])  Treats arthritis, plaque psoriasis, ankylosing spondylitis, Crohn disease, ulcerative colitis, hidradenitis suppurativa, and uveitis. Brand Name(s): Humira   There may be other brand names for this medicine. When This Medicine Should Not Be Used: This medicine is not right for everyone. Do not use it if you had an allergic reaction to adalimumab. How to Use This Medicine:   Injectable  · Your doctor will prescribe your exact dose and tell you how often it should be given. This medicine is given as a shot under your skin. · A nurse or other health provider will give you this medicine. · You may be taught how to give your medicine at home. Make sure you understand all instructions before giving yourself an injection. Do not use more medicine or use it more often than your doctor tells you to. · You will be shown the body areas where this shot can be given. Use a different body area each time you give yourself a shot. Keep track of where you give each shot to make sure you rotate body areas. Do not inject into skin areas that are red, bruised, tender, or hard. If you have psoriasis, do not inject into a raised, thick, red, or scaly skin patch or into skin lesions. · This medicine should come with a Medication Guide. Ask your pharmacist for a copy if you do not have one. · Missed dose: Take a dose as soon as you remember. If it is almost time for your next dose, wait until then and take a regular dose. Do not take extra medicine to make up for a missed dose. · If you store this medicine at home, keep it in the refrigerator. Do not freeze. Protect the medicine from light. Keep your medicine and supplies in the original packages until you are ready to use them.   Drugs and Foods to Avoid:   Ask your doctor or pharmacist before using any other medicine, including over-the-counter medicines, vitamins, and herbal products. · Some foods and medicines can affect how adalimumab works. Tell your doctor if you are using any of the following:   ¨ Abatacept, anakinra, azathioprine, cyclosporine, mercaptopurine, rituximab, theophylline  ¨ A blood thinner (including warfarin)  ¨ Medicine that weakens the immune system (including a steroid or cancer medicine)  · This medicine may interfere with vaccines. Ask your doctor before you get a flu shot or any other vaccines. Warnings While Using This Medicine:   · Tell your doctor if you are pregnant or breastfeeding, or if you have liver disease, a history of cancer, COPD, heart failure, diabetes, psoriasis, multiple sclerosis, optic neuritis, problems with your immune system, or a history of Guillain-Barré syndrome. Tell your doctor if you have any type of infection (such as hepatitis B or tuberculosis) or an infection that keeps coming back. · This medicine may cause the following problems:   ¨ Increased risk for infection  ¨ Increased risk of certain cancers, such as lymphoma or leukemia  ¨ New or worsening heart failure  · Tell your doctor if you have a latex allergy. The needle cover of the syringe contains latex and may cause allergic reactions. · You will need to have a skin test for tuberculosis (TB) before you start this medicine. Tell your doctor if you or anyone in your home has ever had a positive TB skin test or been exposed to TB. · This medicine may make you bleed, bruise, or get infections more easily. Take precautions to prevent illness and injury. Wash your hands often. · Your doctor will do lab tests at regular visits to check on the effects of this medicine. Keep all appointments. · Throw away used needles in a hard, closed container that the needles cannot poke through. Keep this container away from children and pets.   · Keep all medicine out of the reach of children. Never share your medicine with anyone. Possible Side Effects While Using This Medicine:   Call your doctor right away if you notice any of these side effects:  · Allergic reaction: Itching or hives, swelling in your face or hands, swelling or tingling in your mouth or throat, chest tightness, trouble breathing  · Blistering, peeling, red skin rash, or red, scaly patches on the skin  · Change in how much or how often you urinate, painful urination  · Changes in vision  · Chest pain, uneven heartbeat, trouble breathing  · Cough, fever, chills, runny or stuffy nose, sore throat, and body aches  · Dark urine or pale stools, nausea, vomiting, loss of appetite, stomach pain, yellow skin or eyes  · Numbness, tingling, or burning pain in your hands, arms, legs, or feet, or joint pain  · Rapid weight gain, swelling in your hands, ankles, lower legs, or feet  · Sores or white patches on your lips, mouth, or throat  · Swollen glands in your neck, underarms, or groin  · Unusual bleeding, bruising, tiredness, weakness, or weight loss  If you notice these less serious side effects, talk with your doctor:   · Back pain  · Headache  · Redness, itching, bruising, bleeding, pain, or swelling where the shot was given  If you notice other side effects that you think are caused by this medicine, tell your doctor. Call your doctor for medical advice about side effects. You may report side effects to FDA at 7-274-XTR-5628  © 2017 [facility] Information is for End User's use only and may not be sold, redistributed or otherwise used for commercial purposes. The above information is an  only. It is not intended as medical advice for individual conditions or treatments. Talk to your doctor, nurse or pharmacist before following any medical regimen to see if it is safe and effective for you.

## 2019-03-19 ENCOUNTER — HOSPITAL ENCOUNTER (EMERGENCY)
Age: 59
Discharge: HOME | End: 2019-03-19
Attending: STUDENT IN AN ORGANIZED HEALTH CARE EDUCATION/TRAINING PROGRAM
Payer: COMMERCIAL

## 2019-03-19 VITALS
WEIGHT: 134 LBS | RESPIRATION RATE: 11 BRPM | HEIGHT: 63 IN | OXYGEN SATURATION: 93 % | SYSTOLIC BLOOD PRESSURE: 134 MMHG | TEMPERATURE: 98 F | DIASTOLIC BLOOD PRESSURE: 78 MMHG | BODY MASS INDEX: 23.74 KG/M2 | HEART RATE: 104 BPM

## 2019-03-19 DIAGNOSIS — K21.9 GASTROESOPHAGEAL REFLUX DISEASE WITHOUT ESOPHAGITIS: Primary | ICD-10-CM

## 2019-03-19 LAB
ALBUMIN SERPL-MCNC: 4 G/DL (ref 3.5–5)
ALBUMIN/GLOB SERPL: 1.2 {RATIO} (ref 1.1–2.2)
ALP SERPL-CCNC: 96 U/L (ref 45–117)
ALT SERPL-CCNC: 21 U/L (ref 12–78)
ANION GAP SERPL CALC-SCNC: 5 MMOL/L (ref 5–15)
AST SERPL-CCNC: 14 U/L (ref 15–37)
ATRIAL RATE: 101 BPM
BASOPHILS # BLD: 0.1 K/UL (ref 0–0.1)
BASOPHILS NFR BLD: 1 % (ref 0–1)
BILIRUB SERPL-MCNC: 0.4 MG/DL (ref 0.2–1)
BUN SERPL-MCNC: 12 MG/DL (ref 6–20)
BUN/CREAT SERPL: 21 (ref 12–20)
CALCIUM SERPL-MCNC: 9.3 MG/DL (ref 8.5–10.1)
CALCULATED P AXIS, ECG09: 52 DEGREES
CALCULATED R AXIS, ECG10: -6 DEGREES
CALCULATED T AXIS, ECG11: 20 DEGREES
CHLORIDE SERPL-SCNC: 104 MMOL/L (ref 97–108)
CO2 SERPL-SCNC: 29 MMOL/L (ref 21–32)
COMMENT, HOLDF: NORMAL
CREAT SERPL-MCNC: 0.57 MG/DL (ref 0.55–1.02)
DIAGNOSIS, 93000: NORMAL
DIFFERENTIAL METHOD BLD: NORMAL
EOSINOPHIL # BLD: 0.3 K/UL (ref 0–0.4)
EOSINOPHIL NFR BLD: 4 % (ref 0–7)
ERYTHROCYTE [DISTWIDTH] IN BLOOD BY AUTOMATED COUNT: 14.3 % (ref 11.5–14.5)
GLOBULIN SER CALC-MCNC: 3.4 G/DL (ref 2–4)
GLUCOSE SERPL-MCNC: 93 MG/DL (ref 65–100)
HCT VFR BLD AUTO: 41.2 % (ref 35–47)
HGB BLD-MCNC: 13.4 G/DL (ref 11.5–16)
IMM GRANULOCYTES # BLD AUTO: 0 K/UL (ref 0–0.04)
IMM GRANULOCYTES NFR BLD AUTO: 0 % (ref 0–0.5)
LYMPHOCYTES # BLD: 3.1 K/UL (ref 0.8–3.5)
LYMPHOCYTES NFR BLD: 44 % (ref 12–49)
MCH RBC QN AUTO: 28.6 PG (ref 26–34)
MCHC RBC AUTO-ENTMCNC: 32.5 G/DL (ref 30–36.5)
MCV RBC AUTO: 88 FL (ref 80–99)
MONOCYTES # BLD: 0.5 K/UL (ref 0–1)
MONOCYTES NFR BLD: 7 % (ref 5–13)
NEUTS SEG # BLD: 3.2 K/UL (ref 1.8–8)
NEUTS SEG NFR BLD: 44 % (ref 32–75)
NRBC # BLD: 0 K/UL (ref 0–0.01)
NRBC BLD-RTO: 0 PER 100 WBC
P-R INTERVAL, ECG05: 148 MS
PLATELET # BLD AUTO: 231 K/UL (ref 150–400)
PMV BLD AUTO: 9.1 FL (ref 8.9–12.9)
POTASSIUM SERPL-SCNC: 3.4 MMOL/L (ref 3.5–5.1)
PROT SERPL-MCNC: 7.4 G/DL (ref 6.4–8.2)
Q-T INTERVAL, ECG07: 330 MS
QRS DURATION, ECG06: 74 MS
QTC CALCULATION (BEZET), ECG08: 427 MS
RBC # BLD AUTO: 4.68 M/UL (ref 3.8–5.2)
SAMPLES BEING HELD,HOLD: NORMAL
SODIUM SERPL-SCNC: 138 MMOL/L (ref 136–145)
TROPONIN I SERPL-MCNC: <0.05 NG/ML
VENTRICULAR RATE, ECG03: 101 BPM
WBC # BLD AUTO: 7.2 K/UL (ref 3.6–11)

## 2019-03-19 PROCEDURE — 74011000250 HC RX REV CODE- 250: Performed by: STUDENT IN AN ORGANIZED HEALTH CARE EDUCATION/TRAINING PROGRAM

## 2019-03-19 PROCEDURE — 93005 ELECTROCARDIOGRAM TRACING: CPT

## 2019-03-19 PROCEDURE — 74011250637 HC RX REV CODE- 250/637: Performed by: STUDENT IN AN ORGANIZED HEALTH CARE EDUCATION/TRAINING PROGRAM

## 2019-03-19 PROCEDURE — 85025 COMPLETE CBC W/AUTO DIFF WBC: CPT

## 2019-03-19 PROCEDURE — 84484 ASSAY OF TROPONIN QUANT: CPT

## 2019-03-19 PROCEDURE — 99285 EMERGENCY DEPT VISIT HI MDM: CPT

## 2019-03-19 PROCEDURE — 36415 COLL VENOUS BLD VENIPUNCTURE: CPT

## 2019-03-19 PROCEDURE — 80053 COMPREHEN METABOLIC PANEL: CPT

## 2019-03-19 RX ORDER — PANTOPRAZOLE SODIUM 40 MG/1
40 TABLET, DELAYED RELEASE ORAL DAILY
Qty: 20 TAB | Refills: 0 | Status: SHIPPED | OUTPATIENT
Start: 2019-03-19 | End: 2019-04-08

## 2019-03-19 RX ADMIN — LIDOCAINE HYDROCHLORIDE 40 ML: 20 SOLUTION ORAL; TOPICAL at 04:57

## 2019-03-19 NOTE — DISCHARGE INSTRUCTIONS

## 2019-03-20 ENCOUNTER — OFFICE VISIT (OUTPATIENT)
Dept: FAMILY MEDICINE CLINIC | Age: 59
End: 2019-03-20

## 2019-03-20 VITALS
DIASTOLIC BLOOD PRESSURE: 78 MMHG | RESPIRATION RATE: 18 BRPM | SYSTOLIC BLOOD PRESSURE: 112 MMHG | OXYGEN SATURATION: 99 % | HEIGHT: 63 IN | HEART RATE: 100 BPM | WEIGHT: 135 LBS | BODY MASS INDEX: 23.92 KG/M2 | TEMPERATURE: 98.8 F

## 2019-03-20 DIAGNOSIS — J02.9 SORE THROAT: Primary | ICD-10-CM

## 2019-03-20 LAB
S PYO AG THROAT QL: POSITIVE
VALID INTERNAL CONTROL?: YES

## 2019-03-20 RX ORDER — AZITHROMYCIN 250 MG/1
TABLET, FILM COATED ORAL
Qty: 6 TAB | Refills: 0 | Status: SHIPPED | OUTPATIENT
Start: 2019-03-20 | End: 2019-03-25

## 2019-03-20 NOTE — ED PROVIDER NOTES
Patient is a 41-year-old female presenting to the emergency department complaining of midsternal chest pressure and tightness. Patient says that this woke her up sleeping she is also having radiation of the symptoms into her neck. Patient denies shortness of breath. Past Medical History:  
Diagnosis Date  Diabetes ([address]  Neurological disorder   
 migraines  Sarcoidosis  Stickler's syndrome   
 daughter has-pt may have (genetic connective tissue disorder)  Strabismus Past Surgical History:  
Procedure Laterality Date  HX DILATION AND CURETTAGE    
 HX ORTHOPAEDIC Right   
 wrist  
 HX TONSILLECTOMY Family History:  
Problem Relation Age of Onset  Breast Cancer Sister  Arthritis-rheumatoid Mother  Other Brother Sarcoidosis  Cancer Father   
     colon/rectal  
 Heart Attack Father Social History Socioeconomic History  Marital status:  Spouse name: Not on file  Number of children: Not on file  Years of education: Not on file  Highest education level: Not on file Occupational History  Not on file Social Needs  Financial resource strain: Not on file  Food insecurity:  
  Worry: Not on file Inability: Not on file  Transportation needs:  
  Medical: Not on file Non-medical: Not on file Tobacco Use  Smoking status: Former Smoker  Smokeless tobacco: Never Used Substance and Sexual Activity  Alcohol use: No  
 Drug use: No  
 Sexual activity: Yes  
  Partners: Male Lifestyle  Physical activity:  
  Days per week: Not on file Minutes per session: Not on file  Stress: Not on file Relationships  Social connections:  
  Talks on phone: Not on file Gets together: Not on file Attends [employer] service: Not on file Active member of club or organization: Not on file Attends meetings of clubs or organizations: Not on file Relationship status: Not on file  Intimate partner violence:  
  Fear of current or ex partner: Not on file Emotionally abused: Not on file Physically abused: Not on file Forced sexual activity: Not on file Other Topics Concern  Not on file Social History Narrative  Not on file ALLERGIES: Latex; Augmentin [amoxicillin-pot clavulanate]; and Keflex [cephalexin] Review of Systems Cardiovascular: Positive for chest pain. All other systems reviewed and are negative. Vitals:  
 03/19/19 [id number] 03/19/19 0515 03/19/19 0530 03/19/19 [id number] BP: 146/88 (!) 136/93 (!) 130/99 134/78 Pulse: (!) 102 (!) 102 (!) 112 (!) 104 Resp: 12 9 11 Temp:      
SpO2: 94% 95% 95% 93% Weight:      
Height:      
      
 
Physical Exam  
Constitutional: She is oriented to person, place, and time. She appears well-developed and well-nourished. HENT:  
Head: Normocephalic and atraumatic. Eyes: Pupils are equal, round, and reactive to light. EOM are normal.  
Neck: Normal range of motion. Neck supple. Cardiovascular: Regular rhythm. Pulmonary/Chest: Effort normal.  
Abdominal: Soft. Bowel sounds are normal.  
Neurological: She is alert and oriented to person, place, and time. Skin: Skin is warm and dry. Nursing note and vitals reviewed. MDM Number of Diagnoses or Management Options Gastroesophageal reflux disease without esophagitis:  
  
Amount and/or Complexity of Data Reviewed Clinical lab tests: ordered and reviewed Review and summarize past medical records: yes Independent visualization of images, tracings, or specimens: yes Risk of Complications, Morbidity, and/or Mortality Presenting problems: moderate Diagnostic procedures: moderate Management options: moderate Patient Progress Patient progress: stable Procedures

## 2019-03-20 NOTE — PROGRESS NOTES
Subjective:   Marvin Angeles is a 62 y.o. female who complains of sore throat. No fever. No cough or congestion. She was seen in the ER yesterday and dx'ed with GERD. Switched from omeprazole to protonix by ER. Still with sore throat today. Past Medical History:   Diagnosis Date    Diabetes ([address]     Neurological disorder     migraines    Sarcoidosis     Stickler's syndrome     daughter has-pt may have (genetic connective tissue disorder)    Strabismus      Current Outpatient Medications   Medication Sig Dispense Refill    azithromycin (ZITHROMAX) 250 mg tablet Take 2 tablets today, then take 1 tablet daily 6 Tab 0    pantoprazole (PROTONIX) 40 mg tablet Take 1 Tab by mouth daily for 20 days. 20 Tab 0    adalimumab (HUMIRA,CF, PEN) 40 mg/0.4 mL pnkt 40 mg by SubCUTAneous route every fourteen (14) days. 2 Kit 11    ergocalciferol (ERGOCALCIFEROL) 50,000 unit capsule Take 1 Cap by mouth every seven (7) days. 12 Cap 4    folic acid (FOLVITE) 1 mg tablet Take 1 Tab by mouth daily. 90 Tab 0    metFORMIN ER (GLUCOPHAGE XR) 500 mg tablet Take 1 Tab by mouth two (2) times daily (with meals). 180 Tab 3    omeprazole (PRILOSEC) 20 mg capsule Take 20 mg by mouth daily. Allergies   Allergen Reactions    Latex Itching     inflammation    Augmentin [Amoxicillin-Pot Clavulanate] Hives    Keflex [Cephalexin] Other (comments)     Elevated LFT's       Objective:      Visit Vitals  /78 (BP 1 Location: Right arm, BP Patient Position: Sitting)   Pulse 100   Temp 98.8 °F (37.1 °C) (Oral)   Resp 18   Ht 5' 3\" (1.6 m)   Wt 135 lb (61.2 kg)   SpO2 99%   BMI 23.91 kg/m²      GEN: No apparent distress. Alert and oriented and responds to all questions appropriately. EYES: Conjunctiva clear;   EAR: External ears are normal. Tympanic membranes are clear and without effusion. OROPHYARYNX: Erythematous enlarged tonsils without exudate.    NECK: tender ervical lymphadenopathy   LUNGS: Respirations unlabored; clear to auscultation bilaterally   CARDIOVASCULAR: Regular, rate, and rhythm without murmurs, gallops or rubs   EXT: Well perfused. No edema. SKIN: No obvious rashes. Rapid Strep test is positive    Assessment/Plan:   Pharyngitis    ICD-10-CM ICD-9-CM    1. Sore throat J02.9 462 AMB POC RAPID STREP A       1. Pt has allergy to augmentin and keflex. Will treat with z-pack. Discussed possible resistance to z-pack and she will need to f/u if sx persist in 5-7 days. 2. Salt water gargle. 3. Ibuprofen (Motrin, Advil): 200 mg - take 1-4 tablets three times a day as needed for fever and pain. 4. Acetaminophen (Tylenol): 500 mg - take 1-2 tablets every 6 hours as needed for fever and pain. 5. Throat lozenges, such as Halls, as needed. RTC: 5-7 days if not improving. Sooner if sx worsen.

## 2019-03-25 ENCOUNTER — OFFICE VISIT (OUTPATIENT)
Dept: FAMILY MEDICINE CLINIC | Age: 59
End: 2019-03-25

## 2019-03-25 VITALS
TEMPERATURE: 98.6 F | SYSTOLIC BLOOD PRESSURE: 127 MMHG | HEART RATE: 89 BPM | OXYGEN SATURATION: 97 % | WEIGHT: 135 LBS | RESPIRATION RATE: 16 BRPM | DIASTOLIC BLOOD PRESSURE: 72 MMHG | HEIGHT: 63 IN | BODY MASS INDEX: 23.92 KG/M2

## 2019-03-25 DIAGNOSIS — J02.9 SORE THROAT: Primary | ICD-10-CM

## 2019-03-25 DIAGNOSIS — E11.8 CONTROLLED TYPE 2 DIABETES MELLITUS WITH COMPLICATION, WITHOUT LONG-TERM CURRENT USE OF INSULIN (HCC): ICD-10-CM

## 2019-03-25 LAB
S PYO AG THROAT QL: NEGATIVE
VALID INTERNAL CONTROL?: YES

## 2019-03-25 NOTE — PROGRESS NOTES
Subjective Sarah Dunn is an 62 y.o. female who presents for sore throat Details: Was recently treated for strep on 3/20. Felt like she was getting better. Now with throat irritation again. Voice sounds a little hoarse. Eating and drinking ok. No fever. Had enlarged tender nodes that are better. Completing Azithro today (has PCN and cephalosporin allergies). Wants to get her DM labs checked. Allergies - reviewed: Allergies Allergen Reactions  Latex Itching  
  inflammation  Augmentin [Amoxicillin-Pot Clavulanate] Hives  Keflex [Cephalexin] Other (comments) Elevated LFT's  
 
 
 
Medications - reviewed:  
Current Outpatient Medications Medication Sig  
 azithromycin (ZITHROMAX) 250 mg tablet Take 2 tablets today, then take 1 tablet daily  pantoprazole (PROTONIX) 40 mg tablet Take 1 Tab by mouth daily for 20 days.  adalimumab (HUMIRA,CF, PEN) 40 mg/0.4 mL pnkt 40 mg by SubCUTAneous route every fourteen (14) days.  ergocalciferol (ERGOCALCIFEROL) 50,000 unit capsule Take 1 Cap by mouth every seven (7) days.  folic acid (FOLVITE) 1 mg tablet Take 1 Tab by mouth daily.  metFORMIN ER (GLUCOPHAGE XR) 500 mg tablet Take 1 Tab by mouth two (2) times daily (with meals).  omeprazole (PRILOSEC) 20 mg capsule Take 20 mg by mouth daily. No current facility-administered medications for this visit. Past Medical History - reviewed: 
Past Medical History:  
Diagnosis Date  Diabetes ([facility] [address]  Neurological disorder   
 migraines  Sarcoidosis  Stickler's syndrome   
 daughter has-pt may have (genetic connective tissue disorder)  Strabismus Past Surgical History - reviewed:  
Past Surgical History:  
Procedure Laterality Date  HX DILATION AND CURETTAGE    
 HX ORTHOPAEDIC Right   
 wrist  
 HX TONSILLECTOMY Social History - reviewed: 
Social History Socioeconomic History  Marital status:   
 Spouse name: Not on file  Number of children: Not on file  Years of education: Not on file  Highest education level: Not on file Occupational History  Not on file Social Needs  Financial resource strain: Not on file  Food insecurity:  
  Worry: Not on file Inability: Not on file  Transportation needs:  
  Medical: Not on file Non-medical: Not on file Tobacco Use  Smoking status: Former Smoker  Smokeless tobacco: Never Used Substance and Sexual Activity  Alcohol use: No  
 Drug use: No  
 Sexual activity: Yes  
  Partners: Male Lifestyle  Physical activity:  
  Days per week: Not on file Minutes per session: Not on file  Stress: Not on file Relationships  Social connections:  
  Talks on phone: Not on file Gets together: Not on file Attends [employer] service: Not on file Active member of club or organization: Not on file Attends meetings of clubs or organizations: Not on file Relationship status: Not on file  Intimate partner violence:  
  Fear of current or ex partner: Not on file Emotionally abused: Not on file Physically abused: Not on file Forced sexual activity: Not on file Other Topics Concern  Not on file Social History Narrative  Not on file Family History - reviewed: 
Family History Problem Relation Age of Onset  Breast Cancer Sister  Arthritis-rheumatoid Mother  Other Brother Sarcoidosis  Cancer Father   
     colon/rectal  
 Heart Attack Father ROS 
CONSTITUTIONAL: no fever or chills ENT: sore throat as per HPI 
RESPIRATORY: no cough, no difficulty breathing GI: no nausea, vomiting, diarrhea Physical Exam 
Visit Vitals /72 (BP 1 Location: Left arm, BP Patient Position: Sitting) Pulse 89 Temp 98.6 °F (37 °C) (Oral) Resp 16 Ht 5' 3\" (1.6 m) Wt 135 lb (61.2 kg) SpO2 97% BMI 23.91 kg/m² General appearance - alert, well appearing, and in no distress Mouth - mucous membranes moist, pharynx mildly erythematous,  no exudate, no cobblestoning Neck - supple, no significant adenopathy Psych - normal mood and affect Assessment/Plan ICD-10-CM ICD-9-CM 1. Sore throat J02.9 462 AMB POC RAPID STREP A  
   CULTURE, STREP THROAT 2. Controlled type 2 diabetes mellitus with complication, without long-term current use of insulin (HCC) E11.8 250.90 HEMOGLOBIN A1C WITH EAG  
   MICROALBUMIN, UR, RAND W/ MICROALB/CREAT RATIO  
   LIPID PANEL  
   METABOLIC PANEL, BASIC · Just completed Azithro for strep (due to allergies to other Rx). Rapid strep today neg. Due to high rate of resistance with Azithro checking throat culture but suspect this is just some residual irritation and will get better over time. · Monitor for fever given that she is on immunosuppressants, return for any worsening sx · Will check DM labs I have discussed the diagnosis with the patient and the intended plan as seen in the above orders. The patient has received an after-visit summary and questions were answered concerning future plans. I have discussed medication side effects and warnings with the patient as well.  
 
 
Farooq Anders, DO

## 2019-03-25 NOTE — PROGRESS NOTES
Chief Complaint Patient presents with  Sore Throat 1. Have you been to the ER, urgent care clinic since your last visit? Hospitalized since your last visit? [address] [place] [address] for heartburn 2. Have you seen or consulted any other health care providers outside of the [address] [place] [address] since your last visit? Include any pap smears or colon screening.   
No

## 2019-03-26 LAB
BUN SERPL-MCNC: 9 MG/DL (ref 6–24)
BUN/CREAT SERPL: 16 (ref 9–23)
CALCIUM SERPL-MCNC: 9.6 MG/DL (ref 8.7–10.2)
CHLORIDE SERPL-SCNC: 103 MMOL/L (ref 96–106)
CHOLEST SERPL-MCNC: 208 MG/DL (ref 100–199)
CO2 SERPL-SCNC: 24 MMOL/L (ref 20–29)
CREAT SERPL-MCNC: 0.56 MG/DL (ref 0.57–1)
EST. AVERAGE GLUCOSE BLD GHB EST-MCNC: 94 MG/DL
GLUCOSE SERPL-MCNC: 82 MG/DL (ref 65–99)
HBA1C MFR BLD: 4.9 % (ref 4.8–5.6)
HDLC SERPL-MCNC: 45 MG/DL
INTERPRETATION, 910389: NORMAL
LDLC SERPL CALC-MCNC: 116 MG/DL (ref 0–99)
Lab: NORMAL
POTASSIUM SERPL-SCNC: 4.4 MMOL/L (ref 3.5–5.2)
SODIUM SERPL-SCNC: 141 MMOL/L (ref 134–144)
TRIGL SERPL-MCNC: 236 MG/DL (ref 0–149)
VLDLC SERPL CALC-MCNC: 47 MG/DL (ref 5–40)

## 2019-03-27 LAB — S PYO THROAT QL CULT: NEGATIVE

## 2019-04-01 DIAGNOSIS — M06.9 RHEUMATOID ARTHRITIS WITH UNKNOWN RHEUMATOID FACTOR STATUS (HCC): ICD-10-CM

## 2019-04-01 RX ORDER — FOLIC ACID 1 MG/1
TABLET ORAL
Qty: 90 TAB | Refills: 0 | Status: SHIPPED | OUTPATIENT
Start: 2019-04-01 | End: 2019-04-04 | Stop reason: SDUPTHER

## 2019-04-04 DIAGNOSIS — M06.9 RHEUMATOID ARTHRITIS WITH UNKNOWN RHEUMATOID FACTOR STATUS (HCC): ICD-10-CM

## 2019-04-04 RX ORDER — FOLIC ACID 1 MG/1
TABLET ORAL
Qty: 90 TAB | Refills: 0 | Status: SHIPPED | OUTPATIENT
Start: 2019-04-04 | End: 2019-06-17 | Stop reason: ALTCHOICE

## 2019-04-09 ENCOUNTER — OFFICE VISIT (OUTPATIENT)
Dept: FAMILY MEDICINE CLINIC | Age: 59
End: 2019-04-09

## 2019-04-09 VITALS
TEMPERATURE: 98.2 F | RESPIRATION RATE: 18 BRPM | SYSTOLIC BLOOD PRESSURE: 98 MMHG | BODY MASS INDEX: 23.92 KG/M2 | WEIGHT: 135 LBS | DIASTOLIC BLOOD PRESSURE: 66 MMHG | HEIGHT: 63 IN | HEART RATE: 84 BPM | OXYGEN SATURATION: 97 %

## 2019-04-09 DIAGNOSIS — R21 RASH: Primary | ICD-10-CM

## 2019-04-09 RX ORDER — SULFAMETHOXAZOLE AND TRIMETHOPRIM 800; 160 MG/1; MG/1
1 TABLET ORAL 2 TIMES DAILY
Qty: 10 TAB | Refills: 0 | Status: SHIPPED | OUTPATIENT
Start: 2019-04-09 | End: 2019-04-14

## 2019-04-09 NOTE — PATIENT INSTRUCTIONS
[facility]: Jeromy Will MD  Address: [address]  Phone: [phone]

## 2019-04-09 NOTE — PROGRESS NOTES
62year old female with right cheek lesion    + pruritis    Followed by rheumatology -- on gonzálezira    Will refer to dermatology    States that she has had similar lesions in the past and they got worse    I reviewed with the resident the medical history and the resident's findings on the physical examination. I discussed with the resident the patient's diagnosis and concur with the plan.

## 2019-04-09 NOTE — PROGRESS NOTES
Subjective:   Elzbieta Burks is an 62 y.o. female who presents for evaluation of rash of the cheeck. HPI  Chief Complaint   Patient presents with    Skin Problem     right cheek x2 days     2 days ago she noticed a small red bump on the right cheek associated with itching. The bump is not painful, not oozing or bleeding. No fever, no chills. She did not try any treatment. She states that the bump is usually appears on the same spot. She states that sometimes she is getting these big bumps which progress to severe cellulitis. She is asking for antibiotic for this. She denies mouth pain, difficulty with swallowing. She has RA and getting Humira injection every 2 weeks, the next one is scheduled in 3 days. Allergies - reviewed: Allergies   Allergen Reactions    Latex Itching     inflammation    Augmentin [Amoxicillin-Pot Clavulanate] Hives    Keflex [Cephalexin] Other (comments)     Elevated LFT's         Medications - reviewed:  Current Outpatient Medications   Medication Sig    trimethoprim-sulfamethoxazole (BACTRIM DS) 160-800 mg per tablet Take 1 Tab by mouth two (2) times a day for 5 days.  folic acid (FOLVITE) 1 mg tablet TAKE ONE TABLET BY MOUTH ONE TIME DAILY     adalimumab (HUMIRA,CF, PEN) 40 mg/0.4 mL pnkt 40 mg by SubCUTAneous route every fourteen (14) days. Indications: rheumatoid arthritis    metFORMIN ER (GLUCOPHAGE XR) 500 mg tablet Take 1 Tab by mouth two (2) times daily (with meals).  ergocalciferol (ERGOCALCIFEROL) 50,000 unit capsule Take 1 Cap by mouth every seven (7) days.  omeprazole (PRILOSEC) 20 mg capsule Take 20 mg by mouth daily. No current facility-administered medications for this visit.           Past Medical History - reviewed:  Past Medical History:   Diagnosis Date    Diabetes ([facility] [address]     Neurological disorder     migraines    Sarcoidosis     Stickler's syndrome     daughter has-pt may have (genetic connective tissue disorder)    Strabismus Family History - reviewed:  Family History   Problem Relation Age of Onset    Breast Cancer Sister    Santizo Arthritis-rheumatoid Mother     Other Brother         Sarcoidosis    Cancer Father         colon/rectal    Heart Attack Father          Social History - reviewed:  Social History     Socioeconomic History    Marital status:      Spouse name: Not on file    Number of children: Not on file    Years of education: Not on file    Highest education level: Not on file   Occupational History    Not on file   Social Needs    Financial resource strain: Not on file    Food insecurity:     Worry: Not on file     Inability: Not on file    Transportation needs:     Medical: Not on file     Non-medical: Not on file   Tobacco Use    Smoking status: Former Smoker    Smokeless tobacco: Never Used   Substance and Sexual Activity    Alcohol use: No    Drug use: No    Sexual activity: Yes     Partners: Male   Lifestyle    Physical activity:     Days per week: Not on file     Minutes per session: Not on file    Stress: Not on file   Relationships    Social connections:     Talks on phone: Not on file     Gets together: Not on file     Attends Je[employer] witness service: Not on file     Active member of club or organization: Not on file     Attends meetings of clubs or organizations: Not on file     Relationship status: Not on file    Intimate partner violence:     Fear of current or ex partner: Not on file     Emotionally abused: Not on file     Physically abused: Not on file     Forced sexual activity: Not on file   Other Topics Concern    Not on file   Social History Narrative    Not on file         Review of Systems   CONSTITUTIONAL: denies fever. Denies chills. ENT: denies sinus congestion. Denies sinus drainage  CARDIOVASCULAR: denies chest pain. Denies palpitations  MUSCULOSKELETAL: denies joint pain. SKIN: + rash on the face.          Objective:     Visit Vitals  BP 98/66 (BP 1 Location: Left arm, BP Patient Position: Sitting)   Pulse 84   Temp 98.2 °F (36.8 °C) (Oral)   Resp 18   Ht 5' 3\" (1.6 m)   Wt 135 lb (61.2 kg)   SpO2 97%   BMI 23.91 kg/m²       General appearance - alert, well appearing, and in no distress  Eyes - pupils equal and reactive, extraocular eye movements intact  Nose - normal and patent, no erythema, discharge or polyps  Mouth - mucous membranes moist, pharynx normal without lesions  Skin - There is a small bump on the right cheek w/o oozing, bleeding, blanchable, w/o surrounding erythema or swelling. Assessment:    61 yo female who is here for:     ICD-10-CM ICD-9-CM    1. Rash R21 782.1 trimethoprim-sulfamethoxazole (BACTRIM DS) 160-800 mg per tablet      REFERRAL TO DERMATOLOGY       Plan:   · Rash in unclear etiology. ? Side effect of Humira vs rash in the setting of RA vs beginning of cellulitis. No sings of cellulitis at this point however given the previous history of severe cellulitis will give pocket script for Bactrim (due to allergies)  · Advised the patient to try OTC hydrocortisone cream first. If worsening of the swelling can start antibiotics. · Given the recurrence of the bump on the same spot will refer to dermatology for evaluation   · Advised the patient to hold Humira this Friday if the rash does not resolve as if there is a true infection the immunosuppression may worsen the condition, recommended to discuss with her rheumatologist.           I have discussed the diagnosis with the patient and the intended plan as seen in the above orders. The patient has received an after-visit summary and questions were answered concerning future plans. I have discussed medication side effects and warnings with the patient as well. Informed pt to return to the office if new symptoms arise.       Mimi Person MD  Family Medicine Resident, PGY-3

## 2019-04-09 NOTE — PROGRESS NOTES
Identified Patient with two Patient identifiers (Name and ). Two Patient Identifiers confirmed. Reviewed record in preparation for visit and have obtained necessary documentation. Chief Complaint   Patient presents with    Skin Problem     right cheek x2 days       Visit Vitals  BP 98/66 (BP 1 Location: Left arm, BP Patient Position: Sitting)   Pulse 84   Temp 98.2 °F (36.8 °C) (Oral)   Resp 18   Ht 5' 3\" (1.6 m)   Wt 135 lb (61.2 kg)   SpO2 97%   BMI 23.91 kg/m²       1. Have you been to the ER, urgent care clinic since your last visit? Hospitalized since your last visit? No    2. Have you seen or consulted any other health care providers outside of the [address] [place] [address] since your last visit? Include any pap smears or colon screening.  No

## 2019-06-17 ENCOUNTER — OFFICE VISIT (OUTPATIENT)
Dept: RHEUMATOLOGY | Age: 59
End: 2019-06-17

## 2019-06-17 VITALS
TEMPERATURE: 98.1 F | BODY MASS INDEX: 23.74 KG/M2 | WEIGHT: 134 LBS | DIASTOLIC BLOOD PRESSURE: 84 MMHG | HEIGHT: 63 IN | HEART RATE: 84 BPM | RESPIRATION RATE: 18 BRPM | SYSTOLIC BLOOD PRESSURE: 137 MMHG

## 2019-06-17 DIAGNOSIS — E55.9 VITAMIN D DEFICIENCY: ICD-10-CM

## 2019-06-17 DIAGNOSIS — Z86.2 HISTORY OF SARCOIDOSIS: ICD-10-CM

## 2019-06-17 DIAGNOSIS — M65.841 STENOSING TENOSYNOVITIS OF FINGER OF RIGHT HAND: ICD-10-CM

## 2019-06-17 DIAGNOSIS — Z79.60 LONG-TERM USE OF IMMUNOSUPPRESSANT MEDICATION: ICD-10-CM

## 2019-06-17 DIAGNOSIS — M65.842 STENOSING TENOSYNOVITIS OF FINGER OF LEFT HAND: ICD-10-CM

## 2019-06-17 DIAGNOSIS — M06.09 SERONEGATIVE RHEUMATOID ARTHRITIS OF MULTIPLE SITES (HCC): Primary | ICD-10-CM

## 2019-06-17 RX ORDER — LIDOCAINE HYDROCHLORIDE 10 MG/ML
0.1 INJECTION, SOLUTION EPIDURAL; INFILTRATION; INTRACAUDAL; PERINEURAL ONCE
Qty: 0.1 ML | Refills: 0
Start: 2019-06-17 | End: 2019-06-17

## 2019-06-17 NOTE — PROGRESS NOTES
Chief Complaint   Patient presents with    Arthritis     1. Have you been to the ER, urgent care clinic since your last visit? Hospitalized since your last visit? No    2. Have you seen or consulted any other health care providers outside of the [address] [place] [address] since your last visit? Include any pap smears or colon screening. Yes She has seen her eye doctor (Dr. Baylee Oglesby) for a regular check up.

## 2019-06-17 NOTE — PROGRESS NOTES
REASON FOR VISIT    This is a follow-up visit for Ms. Chavez for Seronegative Rheumatoid Arthritis. Inflammatory arthritis phenotype includes:  Anti-CCP positive: no  Rheumatoid factor positive: no  Erosive disease: no  Extra-articular manifestations include: sarcoidosis, diabetic arthropathy (tenosynovitis)    Immunosuppression Screening (1/10/2019): Quantiferon TB: negative  PPD:  Not performed  Hepatitis B: negative  Hepatitis C: negative    Therapy History includes:  Current DMARD therapy include: none  Prior DMARD therapy include: methotrexate 15 mg every Tuesday (2/08/2019 to 3/15/2019), Humira 40 mg every 14 days (3/15/2019 to 6/2019; samples)  Discontinued DMARDs because of inefficacy: Humira  Discontinued DMARDs because of side effects: methotrexate (GI intolerance, cough), Humira (hair loss, weight gain)    Immunization History   Administered Date(s) Administered    Tdap 12/11/2017       Patient Active Problem List   Diagnosis Code    Distal radius fracture S52.509A    Transaminitis R74.0    Acute hepatitis B17.9    Hepatic steatosis K76.0    Stenosing tenosynovitis M65.9    History of sarcoidosis Z86.2    Carpal tunnel syndrome of left wrist G56.02    Seronegative rheumatoid arthritis of multiple sites ([facility] [address] M06.09    Vitamin D deficiency E55.9    Long-term use of immunosuppressant medication Z79.899     HISTORY OF PRESENT ILLNESS    Ms. Magy Mckeon returns for a follow-up. On her last visit, I gave her Humira samples to assess for response. She received her 4th dose on 4/26/19 and reported hair loss and weight gain. She had slightly less pain and improved functionality in her hands. She had a URI and cellulitis and was treated with antibiotics. Today, she feels about the same as her last visit. She has pain in her hand (left 4th MCP) when she tries to straighten her fingers. Her fingers lock and pop. She has constant pain in her hands and wrists that does not improve.  Heat and cold does not help. She feels less swelling in her hands noted by her ring is not as tight as before. She has morning stiffness in her hands lasting hours. Humira did not help any of these symptoms. She denies fever, weight loss, blurred vision, vision loss, oral ulcers, ankle swelling, dry cough, dyspnea, nausea, vomiting, dysphagia, abdominal pain, black or bloody stool, fall since last visit, rash, easy bruising and increased thirst.    Ms. Jose A Ruiz has continued her medications for arthritis and reports good tolerance without significant side effects. Last toxicity monitoring by blood work was done on 3/19/2019 and did not reveal any significant adverse effects. Most recent liver function tests on 3/13/2019. Most recent inflammatory markers from 3/19/2019 revealed a ESR 5 mm/hr (previously N/A mm/hr) and CRP 0.3 mg/L (previously N/A mg/L). The patient has not had any interval hospital admissions, infections, or surgeries. REVIEW OF SYSTEMS    A comprehensive review of systems was performed and pertinent results are documented in the HPI, review of systems is otherwise non-contributory. PAST MEDICAL HISTORY    She has a past medical history of Diabetes ([facility] [address] Neurological disorder, Sarcoidosis, Stickler's syndrome, and Strabismus. FAMILY HISTORY    Her family history includes Arthritis-rheumatoid in her mother; Breast Cancer in her sister; Cancer in her father; Heart Attack in her father; Other in her brother. SOCIAL HISTORY    She reports that she has quit smoking. She has never used smokeless tobacco. She reports that she does not drink alcohol or use drugs. MEDICATIONS    Current Outpatient Medications   Medication Sig Dispense Refill    Cetirizine (ZYRTEC) 10 mg cap Take  by mouth daily.  triamcinolone acetonide (KENALOG) 10 mg/mL injection 1 mL by Intra artICUlar route once for 1 dose.  1 Vial 0    lidocaine, PF, (XYLOCAINE) 10 mg/mL (1 %) injection 0.1 mL by Intra artICUlar route once for 1 dose. 0.1 mL 0    triamcinolone acetonide (KENALOG) 10 mg/mL injection 1 mL by Intra artICUlar route once for 1 dose. 1 Vial 0    lidocaine, PF, (XYLOCAINE) 10 mg/mL (1 %) injection 0.1 mL by Intra artICUlar route once for 1 dose. 0.1 mL 0    metFORMIN ER (GLUCOPHAGE XR) 500 mg tablet Take 1 Tab by mouth two (2) times daily (with meals). 180 Tab 3    ergocalciferol (ERGOCALCIFEROL) 50,000 unit capsule Take 1 Cap by mouth every seven (7) days. 12 Cap 4    omeprazole (PRILOSEC) 20 mg capsule Take 20 mg by mouth daily.  adalimumab (HUMIRA,CF, PEN) 40 mg/0.4 mL pnkt 40 mg by SubCUTAneous route every fourteen (14) days. Indications: rheumatoid arthritis 2 Kit 0        ALLERGIES    Allergies   Allergen Reactions    Latex Itching     inflammation    Augmentin [Amoxicillin-Pot Clavulanate] Hives    Keflex [Cephalexin] Other (comments)     Elevated LFT's       PHYSICAL EXAMINATION    Visit Vitals  /84   Pulse 84   Temp 98.1 °F (36.7 °C)   Resp 18   Ht 5' 3\" (1.6 m)   Wt 134 lb (60.8 kg)   BMI 23.74 kg/m²     Body mass index is 23.74 kg/m². General: Patient is alert, oriented x 3, not in acute distress    HEENT:   Sclerae are not injected and appear moist.  There is no alopecia. Neck is supple     Cardiovascular:  Heart is regular rate and rhythm, no murmurs. Chest:  Lungs are clear to auscultation bilaterally. No rhonchi, wheezes, or crackles. Extremities:  Free of clubbing, cyanosis, edema    Neurological exam:  Muscle strength is full in upper and lower extremities. Skin exam:  There are no rashes, no alopecia, no discoid lesions, no active Raynaud's, no livedo reticularis, no periungual erythema. Surgical scar on volar right wrist s/p fracture and surgical repair    Musculoskeletal exam:  A comprehensive musculoskeletal exam was performed for all joints of each upper and lower extremity and assessed for swelling, tenderness and range of motion.  Positive results are documented as below:    Tenderness of bilateral 4th A1 pulley  Tenderness of the bilateral 2nd,3rd, 5th A1 pulley (RESOLVED)    Z-Deformities:   no  [place]ck Deformities:  no  Boutonierre's Deformities:  no  Ulnar Deviation:   no  MCP Subluxation:  no     Joint Count 6/17/2019 3/15/2019 2/8/2019 1/10/2019   Patient pain (0-100) 55 80 5 90   MHAQ 0.125 0.125 0 0.25   Left wrist- Tender 0 0 - 1   Left wrist- Swollen 1 1 - 1   Right wrist- Tender - 0 - -   Right wrist- Swollen - 1 - -   Right 4th PIP - Tender - 1 - -   Right 4th PIP - Swollen - 0 - -   Tender Joint Count (Total) 0 1 - 1   Swollen Joint Count (Total) 1 2 - 1   Physician Assessment (0-10) - 1 - 1   Patient Assessment (0-10) 5.5 8 0.5 8.5   CDAI Total (calculated) - 12 - 11.5     DATA REVIEW    Laboratory     Recent laboratory results were reviewed, summarized, and discussed with the patient. Imaging    Musculoskeletal Ultrasound    None    Radiographs    Bilateral Hand 1/10/2019: RIGHT: The patient is status post ORIF of the distal radius with a volar plate and several screws. The hardware is intact. The bone is healed. No acute fracture or dislocation. No significant arthritis. No erosions. The soft tissues appear unremarkable. LEFT: no fracture, dislocation or other acute osseous or articular abnormality. The soft tissues are within normal limits. Bilateral Foot 1/10/2019: RIGHT: No acute fracture or dislocation. No significant arthritis. There is a type II accessory navicular bone. There is a plantar calcaneal enthesophyte. LEFT: No acute fracture or dislocation. No significant arthritis. There is a type II accessory navicular bone. There is a plantar calcaneal enthesophyte.     Chest 12/05/2017: clear lungs. The cardiac and mediastinal contours and pulmonary vascularity are normal.  The bones and soft tissues are within normal limits.      CT Imaging    CT Abdomen and Pelvis with contrast 9/16/2018: there is no inflammation, ascites, pneumoperitoneum or significant adenopathy. Liver is fatty without focal lesion. Bile ducts are not enlarged. Gallbladder is unremarkable by CT. Pancreas shows no mass or inflammation. Spleen is unremarkable. Adrenal glands are normal in size. Kidneys show no mass or hydronephrosis. Aorta is without aneurysm. The appendix is normal. Bowels are not dilated. The bladder is not distended. The distal ureters are not dilated. There is no apparent pelvic mass. MR Imaging    None    DXA     None    PROCEDURE     [facility]  OFFICE PROCEDURE PROGRESS NOTE      Symptom relief from Bilateral stenosing tenosynovitis of 4th MCP A1 pulley. (06/17/19)     Chart reviewed for the following:   IRichi MD, have reviewed the History, Physical and updated the Allergic reactions for [facility] performed immediately prior to start of procedure:   Ron Zhou MD, have performed the following reviews on [facility] prior to the start of the procedure            * Patient was identified by name and date of birth   * Agreement on procedure being performed was verified  * Risks and Benefits explained to the patient  * Procedure site verified and marked as necessary  * Patient was positioned for comfort  * Consent was signed and verified     Time: 10:39 AM    Date of procedure: 6/17/2019    Procedure performed by: Richi Murillo MD    Provider assisted by: self    Patient assisted by: self    How tolerated by patient: tolerated the procedure well with no complications    Post Procedural Pain Scale: 2 - Hurts Little Bit    Comments: none    Indications:   Symptom relief from Bilateral Stenosing Tenosynovitis of 4th MCP A1 pulley Osteoarthritis. (06/17/19)     Procedure:   After consent was obtained, and timeout performed, using sterile technique the Left 4th MCP A1 pulley was prepped using Chlorprep. Local anesthetic used: Ethyl Chloride.  The joint was entered and 0 ml's of fluid was withdrawn. Kenalog 10 mg was mixed with 1% lidocaine 0.1 ml and injected into the joint and the needle withdrawn. The procedure was well tolerated. After consent was obtained, and timeout performed, using sterile technique the Right 4th MCP A1 pulley was prepped using Chlorprep. Local anesthetic used: Ethyl Chloride. The joint was entered and 0 ml's of fluid was withdrawn. Kenalog 10 mg was mixed with 1% lidocaine 0.1 ml and injected into the joint and the needle withdrawn. The procedure was well tolerated. The patient was asked to continue to rest the joint for a few more days before resuming regular activities. It may be more painful for the first 1-2 days. Watch for fever, or increased swelling or persistent pain in the joint. Call or return to clinic as needed if such symptoms occur or there is failure to improve as anticipated. ASSESSMENT AND PLAN    This is a follow-up visit for Ms. Chavez. 1) Seronegative Rheumatoid Arthritis. Most of her complaints are A1 pulley stenosing tenosynovitis. She has bilateral wrist fullness. She felt better on prednisone. She did not tolerate methotrexate due to GI upset. She did not have improvement on Humira and noted weight gain and hair loss. Today, her only symptoms are bilateral 4th stenosing tenosynovitis, which compared to before, improved, since she had more involvement. I injected her bilateral 4th A1 pulleys with good tolerance. I suspect diabetic arthropathy contributing to this as well. An elevated or normal HbA1c does not contribute to this manifestation. 2) Long Term Use of Immunosuppressants. The patient is off immunomodulatory medications. 3) History of Sarcoidosis. This diagnosed by tissue and treated with prednisone. Chest radiograph in 12/2017 was negative. 4) Left Carpal Tunnel Syndrome. This is likely from #1. 5) Vitamin D Deficiency. Her vitamin D level was 10.0.  She is on weekly ergocalciferol 50,000, which she is taking with good tolerance and no effect of her liver function tests. 6) Hepatic Steatosis. This was seen on imaging. I will use caution while on methotrexate. Her liver function tests were normal.    The patient voiced understanding of the aforementioned assessment and plan. Summary of plan was provided in the After Visit Summary patient instructions.      TODAY'S ORDERS    Orders Placed This Encounter    TRIAMCINOLONE ACETONIDE INJ    28254 - INJECT TENDON SHEATH/LIGAMENT    TRIAMCINOLONE ACETONIDE INJ    15728 - INJECT TENDON SHEATH/LIGAMENT    triamcinolone acetonide (KENALOG) 10 mg/mL injection    lidocaine, PF, (XYLOCAINE) 10 mg/mL (1 %) injection    triamcinolone acetonide (KENALOG) 10 mg/mL injection    lidocaine, PF, (XYLOCAINE) 10 mg/mL (1 %) injection     Future Appointments   Date Time Provider Jorden Singleton   9/30/2019  9:40 AM Brandon Peterson MD [place], MD, [facility]    Adult Rheumatology   Rheumatology Ultrasound Certified  Chris Franac  A Part of [address]   Phone [phone]  Fax [phone]

## 2019-06-17 NOTE — PATIENT INSTRUCTIONS
Please continue to rest the joint for a few more days before resuming regular activities. It may be more painful for the first 1-2 days. Watch for fever, or increased swelling or persistent pain.  Call or return to clinic as needed if such symptoms occur or there is failure to improve as anticipated.

## 2019-09-16 DIAGNOSIS — M06.9 RHEUMATOID ARTHRITIS WITH UNKNOWN RHEUMATOID FACTOR STATUS (HCC): ICD-10-CM

## 2019-09-16 RX ORDER — FOLIC ACID 1 MG/1
TABLET ORAL
Qty: 90 TAB | Refills: 0 | Status: SHIPPED | OUTPATIENT
Start: 2019-09-16 | End: 2019-09-30 | Stop reason: ALTCHOICE

## 2019-09-30 ENCOUNTER — OFFICE VISIT (OUTPATIENT)
Dept: RHEUMATOLOGY | Age: 59
End: 2019-09-30

## 2019-09-30 VITALS
DIASTOLIC BLOOD PRESSURE: 77 MMHG | HEART RATE: 96 BPM | BODY MASS INDEX: 23.74 KG/M2 | HEIGHT: 63 IN | RESPIRATION RATE: 18 BRPM | TEMPERATURE: 97.9 F | WEIGHT: 134 LBS | SYSTOLIC BLOOD PRESSURE: 124 MMHG

## 2019-09-30 DIAGNOSIS — E55.9 VITAMIN D DEFICIENCY: ICD-10-CM

## 2019-09-30 DIAGNOSIS — E11.618 DIABETIC ARTHROPATHY (HCC): ICD-10-CM

## 2019-09-30 DIAGNOSIS — M06.09 SERONEGATIVE RHEUMATOID ARTHRITIS OF MULTIPLE SITES (HCC): Primary | ICD-10-CM

## 2019-09-30 DIAGNOSIS — M65.841 STENOSING TENOSYNOVITIS OF FINGER OF RIGHT HAND: ICD-10-CM

## 2019-09-30 RX ORDER — LIDOCAINE HYDROCHLORIDE 10 MG/ML
0.1 INJECTION, SOLUTION EPIDURAL; INFILTRATION; INTRACAUDAL; PERINEURAL ONCE
Qty: 0.1 ML | Refills: 0
Start: 2019-09-30 | End: 2019-09-30

## 2019-09-30 NOTE — PROGRESS NOTES
Chief Complaint   Patient presents with    Arthritis     1. Have you been to the ER, urgent care clinic since your last visit? Hospitalized since your last visit? No    2. Have you seen or consulted any other health care providers outside of the [address] [place] [address] since your last visit? Include any pap smears or colon screening.  No

## 2019-09-30 NOTE — PROGRESS NOTES
REASON FOR VISIT    This is a follow-up visit for Ms. Chavez for     ICD-10-CM   1. Seronegative rheumatoid arthritis of multiple sites ([facility] M06.09   2. Diabetic arthropathy (HCC) E11.618     Inflammatory arthritis phenotype includes:  Anti-CCP positive: no  Rheumatoid factor positive: no  Erosive disease: no  Extra-articular manifestations include: sarcoidosis, diabetic arthropathy (tenosynovitis)    Immunosuppression Screening (1/10/2019): Quantiferon TB: negative  PPD:  Not performed  Hepatitis B: negative  Hepatitis C: negative    Therapy History includes:  Current DMARD therapy include: none  Prior DMARD therapy include: methotrexate 15 mg every Tuesday (2/08/2019 to 3/15/2019), Humira 40 mg every 14 days (3/15/2019 to 6/2019; samples)  Discontinued DMARDs because of inefficacy: Humira  Discontinued DMARDs because of side effects: methotrexate (GI intolerance, cough), Humira (hair loss, weight gain)    Immunization History   Administered Date(s) Administered    Tdap 12/11/2017       Patient Active Problem List   Diagnosis Code    Distal radius fracture S52.509A    Transaminitis R74.0    Acute hepatitis B17.9    Hepatic steatosis K76.0    Stenosing tenosynovitis M65.9    History of sarcoidosis Z86.2    Carpal tunnel syndrome of left wrist G56.02    Seronegative rheumatoid arthritis of multiple sites ([facility] M06.09    Vitamin D deficiency E55.9    Long-term use of immunosuppressant medication Z79.899     HISTORY OF PRESENT ILLNESS    Ms. Gina Nichole returns for a follow-up. On her last visit, I suspected diabetic arthropathy cause stenosing tenosynovitis, so I injected her bilateral 4th A1 pulleys with good tolerance and improvement. Today, she complains of popping and locking in right 2nd and 3rd and left thumb  and tenderness in her bilateral 4th A1 pulley. 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 She feels stiffness in her MCPs that several hours.  She denies pain in her joints or swelling    She denies fever, weight loss, blurred vision, vision loss, oral ulcers, ankle swelling, dry cough, dyspnea, nausea, vomiting, dysphagia, abdominal pain, black or bloody stool, fall since last visit, rash, easy bruising and increased thirst.    Ms. Mary Garay has continued her medications for arthritis and reports good tolerance without significant side effects. Last toxicity monitoring by blood work was done on 3/19/2019 and did not reveal any significant adverse effects. Most recent liver function tests on 3/13/2019. Most recent inflammatory markers from 3/19/2019 revealed a ESR 5 mm/hr (previously N/A mm/hr) and CRP 0.3 mg/L (previously N/A mg/L). The patient has not had any interval hospital admissions, infections, or surgeries. REVIEW OF SYSTEMS    A comprehensive review of systems was performed and pertinent results are documented in the HPI, review of systems is otherwise non-contributory. PAST MEDICAL HISTORY    She has a past medical history of Diabetes ([address] Neurological disorder, Sarcoidosis, Stickler's syndrome, and Strabismus. FAMILY HISTORY    Her family history includes Arthritis-rheumatoid in her mother; Breast Cancer in her sister; Cancer in her father; Heart Attack in her father; Other in her brother. SOCIAL HISTORY    She reports that she has quit smoking. She has never used smokeless tobacco. She reports that she does not drink alcohol or use drugs. MEDICATIONS    Current Outpatient Medications   Medication Sig Dispense Refill    triamcinolone acetonide (KENALOG) 10 mg/mL injection 1 mL by Intra artICUlar route once for 1 dose. 1 Vial 0    lidocaine, PF, (XYLOCAINE) 10 mg/mL (1 %) injection 0.1 mL by Intra artICUlar route once for 1 dose. 0.1 mL 0    triamcinolone acetonide (KENALOG) 10 mg/mL injection 1 mL by Intra artICUlar route once for 1 dose.  1 Vial 0    lidocaine, PF, (XYLOCAINE) 10 mg/mL (1 %) injection 0.1 mL by Intra artICUlar route once for 1 dose. 0.1 mL 0    Cetirizine (ZYRTEC) 10 mg cap Take  by mouth daily.  metFORMIN ER (GLUCOPHAGE XR) 500 mg tablet Take 1 Tab by mouth two (2) times daily (with meals). 180 Tab 3    ergocalciferol (ERGOCALCIFEROL) 50,000 unit capsule Take 1 Cap by mouth every seven (7) days. 12 Cap 4    omeprazole (PRILOSEC) 20 mg capsule Take 20 mg by mouth daily. ALLERGIES    Allergies   Allergen Reactions    Latex Itching     inflammation    Augmentin [Amoxicillin-Pot Clavulanate] Hives    Keflex [Cephalexin] Other (comments)     Elevated LFT's       PHYSICAL EXAMINATION    Visit Vitals  /77   Pulse 96   Temp 97.9 °F (36.6 °C)   Resp 18   Ht 5' 3\" (1.6 m)   Wt 134 lb (60.8 kg)   BMI 23.74 kg/m²     Body mass index is 23.74 kg/m². General: Patient is alert, oriented x 3, not in acute distress    HEENT:   Sclerae are not injected and appear moist.  There is no alopecia. Neck is supple     Cardiovascular:  Heart is regular rate and rhythm, no murmurs. Chest:  Lungs are clear to auscultation bilaterally. No rhonchi, wheezes, or crackles. Extremities:  Free of clubbing, cyanosis, edema    Neurological exam:  Muscle strength is full in upper and lower extremities. Skin exam:  There are no rashes, no alopecia, no discoid lesions, no active Raynaud's, no livedo reticularis, no periungual erythema. Surgical scar on volar right wrist s/p fracture and surgical repair    Musculoskeletal exam:  A comprehensive musculoskeletal exam was performed for all joints of each upper and lower extremity and assessed for swelling, tenderness and range of motion.  Positive results are documented as below:    Tenderness of bilateral 4th A1 pulley  Tenderness of the right 2nd, 3rd, A1 pulley     Z-Deformities:   no  [place]ck Deformities:  no  Boutonierre's Deformities:  no  Ulnar Deviation:   no  MCP Subluxation:  no     Joint Count 9/30/2019 6/17/2019 3/15/2019 2/8/2019 1/10/2019   Patient pain (0-100) 0 55 80 5 90 MHAQ 0 0.125 0.125 0 0.25   Left wrist- Tender - 0 0 - 1   Left wrist- Swollen - 1 1 - 1   Right wrist- Tender - - 0 - -   Right wrist- Swollen - - 1 - -   Right 4th PIP - Tender - - 1 - -   Right 4th PIP - Swollen - - 0 - -   Tender Joint Count (Total) - 0 1 - 1   Swollen Joint Count (Total) - 1 2 - 1   Physician Assessment (0-10) - - 1 - 1   Patient Assessment (0-10) 0 5.5 8 0.5 8.5   CDAI Total (calculated) - - 12 - 11.5     DATA REVIEW    Laboratory     Recent laboratory results were reviewed, summarized, and discussed with the patient. Imaging    Musculoskeletal Ultrasound    None    Radiographs    Bilateral Hand 1/10/2019: RIGHT: The patient is status post ORIF of the distal radius with a volar plate and several screws. The hardware is intact. The bone is healed. No acute fracture or dislocation. No significant arthritis. No erosions. The soft tissues appear unremarkable. LEFT: no fracture, dislocation or other acute osseous or articular abnormality. The soft tissues are within normal limits. Bilateral Foot 1/10/2019: RIGHT: No acute fracture or dislocation. No significant arthritis. There is a type II accessory navicular bone. There is a plantar calcaneal enthesophyte. LEFT: No acute fracture or dislocation. No significant arthritis. There is a type II accessory navicular bone. There is a plantar calcaneal enthesophyte.     Chest 12/05/2017: clear lungs. The cardiac and mediastinal contours and pulmonary vascularity are normal.  The bones and soft tissues are within normal limits. CT Imaging    CT Abdomen and Pelvis with contrast 9/16/2018: there is no inflammation, ascites, pneumoperitoneum or significant adenopathy. Liver is fatty without focal lesion. Bile ducts are not enlarged. Gallbladder is unremarkable by CT. Pancreas shows no mass or inflammation. Spleen is unremarkable. Adrenal glands are normal in size. Kidneys show no mass or hydronephrosis. Aorta is without aneurysm.   The appendix is normal. Bowels are not dilated. The bladder is not distended. The distal ureters are not dilated. There is no apparent pelvic mass. MR Imaging    None    DXA     None    PROCEDURE     Bilateral Stenosing Tenosynovitis of 4th MCP A1 pulley Stenosing Tenosynovitis. (06/17/19)     [address] [facility]  OFFICE PROCEDURE PROGRESS NOTE      Symptom relief from Right 2nd and 3rd stenosing tenosynovitis of MCP A1 pulley. (9/30/19)     Chart reviewed for the following:   I, Stevenson Swift MD, have reviewed the History, Physical and updated the Allergic reactions for Alex Henderson performed immediately prior to start of procedure:   Jackie Azevedo MD, have performed the following reviews on Mahamed Grimes prior to the start of the procedure            * Patient was identified by name and date of birth   * Agreement on procedure being performed was verified  * Risks and Benefits explained to the patient  * Procedure site verified and marked as necessary  * Patient was positioned for comfort  * Consent was signed and verified     Time: 10:39 AM    Date of procedure: 9/30/2019    Procedure performed by: Stevenson Swift MD    Provider assisted by: self    Patient assisted by: self    How tolerated by patient: tolerated the procedure well with no complications    Post Procedural Pain Scale: 2 - Hurts Little Bit    Comments: none    Indications:   Symptom relief from Right 2nd and 3rd Stenosing Tenosynovitis of A1 pulley. (09/30/19)     Procedure:   After consent was obtained, and timeout performed, using sterile technique the Right 2nd MCP A1 pulley was prepped using Chlorprep. Local anesthetic used: Ethyl Chloride. The joint was entered and 0 ml's of fluid was withdrawn. Kenalog 10 mg was mixed with 1% lidocaine 0.1 ml and injected into the joint and the needle withdrawn. The procedure was well tolerated.      After consent was obtained, and timeout performed, using sterile technique the Right 3rd MCP A1 pulley was prepped using Chlorprep. Local anesthetic used: Ethyl Chloride. The joint was entered and 0 ml's of fluid was withdrawn. Kenalog 10 mg was mixed with 1% lidocaine 0.1 ml and injected into the joint and the needle withdrawn. The procedure was well tolerated. The patient was asked to continue to rest the joint for a few more days before resuming regular activities. It may be more painful for the first 1-2 days. Watch for fever, or increased swelling or persistent pain in the joint. Call or return to clinic as needed if such symptoms occur or there is failure to improve as anticipated. ASSESSMENT AND PLAN    This is a follow-up visit for Ms. Chavez. 1) Seronegative Rheumatoid Arthritis. This is in remission off treatment. I asked her to follow up with me if this recurs. 2) Stenosing Tenosynovitis. Most of her complaints are A1 pulley stenosing tenosynovitis. I injected her bilateral 4th A1 pulleys previously with good tolerance but now has right 2nd and 3rd involvement. 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 I also suspect diabetic arthropathy contributing to this as well. An elevated or normal HbA1c does not contribute to this manifestation. I referred her to hand orthopedics. 3) History of Sarcoidosis. This diagnosed by tissue and treated with prednisone. Chest radiograph in 12/2017 was negative. 4) Left Carpal Tunnel Syndrome. This has resolved. 5) Vitamin D Deficiency. Her vitamin D level was 10.0. She is on weekly ergocalciferol 50,000, which she is taking with good tolerance. 6) Hepatic Steatosis. This was seen on imaging. Her liver function tests were normal.    The patient voiced understanding of the aforementioned assessment and plan. Summary of plan was provided in the After Visit Summary patient instructions.      TODAY'S ORDERS    Orders Placed This Encounter    REFERRAL TO ORTHOPEDICS    TRIAMCINOLONE ACETONIDE INJ    20550 - INJECT TENDON SHEATH/LIGAMENT    TRIAMCINOLONE ACETONIDE INJ    triamcinolone acetonide (KENALOG) 10 mg/mL injection    lidocaine, PF, (XYLOCAINE) 10 mg/mL (1 %) injection    triamcinolone acetonide (KENALOG) 10 mg/mL injection    lidocaine, PF, (XYLOCAINE) 10 mg/mL (1 %) injection     No future appointments.     Francisco William MD, [address] [facility]    Adult Rheumatology   Rheumatology Ultrasound Certified  [address] [facility], [address] [place] [address]   Phone [phone]  Fax [phone]

## 2019-10-17 ENCOUNTER — OFFICE VISIT (OUTPATIENT)
Dept: FAMILY MEDICINE CLINIC | Age: 59
End: 2019-10-17

## 2019-10-17 VITALS
BODY MASS INDEX: 23.07 KG/M2 | WEIGHT: 130.2 LBS | RESPIRATION RATE: 18 BRPM | HEART RATE: 99 BPM | SYSTOLIC BLOOD PRESSURE: 109 MMHG | HEIGHT: 63 IN | TEMPERATURE: 96.5 F | OXYGEN SATURATION: 95 % | DIASTOLIC BLOOD PRESSURE: 81 MMHG

## 2019-10-17 DIAGNOSIS — Z12.11 COLON CANCER SCREENING: ICD-10-CM

## 2019-10-17 DIAGNOSIS — E11.8 CONTROLLED TYPE 2 DIABETES MELLITUS WITH COMPLICATION, WITHOUT LONG-TERM CURRENT USE OF INSULIN (HCC): Primary | ICD-10-CM

## 2019-10-17 DIAGNOSIS — Z28.21 INFLUENZA VACCINATION DECLINED: ICD-10-CM

## 2019-10-17 PROBLEM — E11.9 DIABETES MELLITUS (HCC): Status: ACTIVE | Noted: 2018-09-13

## 2019-10-17 PROBLEM — D86.9 SARCOIDOSIS: Status: ACTIVE | Noted: 2018-09-13

## 2019-10-17 LAB — HBA1C MFR BLD HPLC: 5 %

## 2019-10-17 RX ORDER — MOMETASONE FUROATE 50 UG/1
2 SPRAY, METERED NASAL
COMMUNITY
End: 2021-11-19 | Stop reason: ALTCHOICE

## 2019-10-17 RX ORDER — LANCETS 33 GAUGE
EACH MISCELLANEOUS
COMMUNITY

## 2019-10-17 NOTE — PROGRESS NOTES
Identified pt with two pt identifiers(name and ). Reviewed record in preparation for visit and have obtained necessary documentation. Chief Complaint   Patient presents with   [facility] Due   Topic    Pneumococcal 0-64 years (1 of 1 - PPSV23)    FOOT EXAM Q1     EYE EXAM RETINAL OR DILATED     Shingrix Vaccine Age 50> (1 of 2)    FOBT Q 1 YEAR AGE 54-65     MICROALBUMIN Q1     Influenza Age 5 to Adult     HEMOGLOBIN A1C Q6M     BREAST CANCER SCRN MAMMOGRAM        Visit Vitals  /81 (BP 1 Location: Right arm, BP Patient Position: Sitting)   Pulse 99   Temp 96.5 °F (35.8 °C) (Oral)   Resp 18   Ht 5' 3\" (1.6 m)   Wt 130 lb 3.2 oz (59.1 kg)   SpO2 95%   BMI 23.06 kg/m²         Coordination of Care Questionnaire:  :   1) Have you been to an emergency room, urgent care, or hospitalized since your last visit? If yes, where when, and reason for visit? no       2. Have seen or consulted any other health care provider since your last visit? If yes, where when, and reason for visit? NO      3) Do you have an Advanced Directive/ Living Will in place? NO  If no, would you like information NO    Patient is accompanied by self I have received verbal consent from Gloria Winslow to discuss any/all medical information while they are present in the room.

## 2019-10-17 NOTE — PROGRESS NOTES
HPI       Chief Complaint: DMII     Farida Jacobs is a 61 y.o. female who presents for DMII check. She received a message through [employer] that she was due and wants to check on her other health maintenance. DMII - A1c 4.9 (3/25/19). On metformin 500mg BID. She states she has been receiving steroid injections for trigger finger and is requesting A1c check today. She is going to have surgery for the trigger finger. Checks her BG \"several times a day because I'm a control freak\"   - Last eye exam 3/2019, normal   - Last urine microalbum 4/2018   - Lipid panel done 3/2019  - Diabetic foot exam - due today. Checks her feet at home. - Diet - following a diet based on what her BG are at home. Very low carb (45 carbs or less per meal)  - Exercise - walking 1-2 miles daily    Other health maintenance  - Colonoscopy - has not yet had one - would like referral today  - Shingrix vaccination - pt declines  - Influenza vaccine - pt declines  - Mammogram due 1/2020  - Tdap - UTD  - Pap - due 12/2020  - Pneumococcal - pt declines    PMHx:  Past Medical History:   Diagnosis Date    Diabetes ([facility]     Neurological disorder     migraines    Sarcoidosis     Stickler's syndrome     daughter has-pt may have (genetic connective tissue disorder)    Strabismus      Meds:   Current Outpatient Medications   Medication Sig Dispense Refill    glucose blood VI test strips (TRUE METRIX GLUCOSE TEST STRIP) strip True Metrix Glucose Test Strip      lancets (TRUEPLUS LANCETS) 33 gauge misc TRUEplus Lancets 33 gauge      mometasone (NASONEX) 50 mcg/actuation nasal spray 2 Sprays by Nasal route.  Cetirizine (ZYRTEC) 10 mg cap Take  by mouth daily.  metFORMIN ER (GLUCOPHAGE XR) 500 mg tablet Take 1 Tab by mouth two (2) times daily (with meals). 180 Tab 3    ergocalciferol (ERGOCALCIFEROL) 50,000 unit capsule Take 1 Cap by mouth every seven (7) days.  12 Cap 4    omeprazole (PRILOSEC) 20 mg capsule Take 20 mg by mouth daily. Allergies: Allergies   Allergen Reactions    Latex Itching     inflammation    Augmentin [Amoxicillin-Pot Clavulanate] Hives    Keflex [Cephalexin] Other (comments)     Elevated LFT's       Smoker:  Social History     Tobacco Use   Smoking Status Former Smoker   Smokeless Tobacco Never Used     ETOH:   Social History     Substance and Sexual Activity   Alcohol Use No     FH:   Family History   Problem Relation Age of Onset    Breast Cancer Sister    Shelley Pastor Arthritis-rheumatoid Mother     Other Brother         Sarcoidosis    Cancer Father         colon/rectal    Heart Attack Father        ROS  Review of Systems   Constitutional: Negative for chills, fever and weight loss. Eyes: Negative for blurred vision and double vision. Cardiovascular: Negative for chest pain and palpitations. Gastrointestinal: Negative for abdominal pain, constipation, diarrhea, nausea and vomiting. Genitourinary: Negative for dysuria, frequency and urgency. Neurological: Negative for dizziness, weakness and headaches. Physical Exam:  Visit Vitals  /81 (BP 1 Location: Right arm, BP Patient Position: Sitting)   Pulse 99   Temp 96.5 °F (35.8 °C) (Oral)   Resp 18   Ht 5' 3\" (1.6 m)   Wt 130 lb 3.2 oz (59.1 kg)   SpO2 95%   BMI 23.06 kg/m²       Wt Readings from Last 3 Encounters:   10/17/19 130 lb 3.2 oz (59.1 kg)   09/30/19 134 lb (60.8 kg)   06/17/19 134 lb (60.8 kg)     BP Readings from Last 3 Encounters:   10/17/19 109/81   09/30/19 124/77   06/17/19 137/84      Physical Exam   Constitutional: She is oriented to person, place, and time. She appears well-developed and well-nourished. HENT:   Head: Normocephalic and atraumatic. Eyes: EOM are normal.   Neck: Normal range of motion. Neck supple. No thyromegaly present. Cardiovascular: Normal rate and regular rhythm. No murmur heard. Pulmonary/Chest: Effort normal and breath sounds normal. No respiratory distress. She has no wheezes. She has no rales. Abdominal: Soft. Bowel sounds are normal. She exhibits no distension. There is no tenderness. Neurological: She is alert and oriented to person, place, and time. Normal monofilament exam bilaterally   Psychiatric: She has a normal mood and affect. Vitals reviewed. Assessment     61 y.o. female with:    ICD-10-CM ICD-9-CM    1. Controlled type 2 diabetes mellitus with complication, without long-term current use of insulin (HCC) E11.8 250.90 MICROALBUMIN, UR, RAND W/ MICROALB/CREAT RATIO       DIABETES FOOT EXAM      AMB POC HEMOGLOBIN A1C      CANCELED: HEMOGLOBIN A1C WITH EAG   2. Colon cancer screening Z12.11 V76.51 REFERRAL TO GASTROENTEROLOGY   3. Influenza vaccination declined Z28.21 V64.06               Plan     1. Controlled type 2 diabetes mellitus with complication, without long-term current use of insulin (HCC)  A1c well controlled, on metformin 500 BID. Pt with recent steroid injections so she is requesting a recheck of this. Normal diabetic foot exam today.   - HEMOGLOBIN A1C WITH EAG  - MICROALBUMIN, UR, RAND W/ MICROALB/CREAT RATIO  -  DIABETES FOOT EXAM    2. Colon cancer screening  Referral placed. - REFERRAL TO GASTROENTEROLOGY    Other health maintenance - pt declined shingrix, influenza vaccination, and pnuemonococcal vaccination        Follow-up and Dispositions    · Return in about 4 months (around 3/1/2020) for DMII and cholesterol check . Patient discussed with Dr. Odell Ingram (Attending Physician)    I have discussed the diagnosis with the patient and the intended plan as seen in the above orders. The patient has received an after-visit summary and questions were answered concerning future plans. I have discussed medication side effects and warnings with the patient as well.     Cornell Duque MD  Family Medicine Resident  PGY-3

## 2019-10-17 NOTE — PROGRESS NOTES
62 yo female here for diabetes check    I reviewed with the resident the medical history and the resident's findings on the physical examination. I discussed with the resident the patient's diagnosis and concur with the plan.

## 2019-10-17 NOTE — PATIENT INSTRUCTIONS
Please call the GI doctor to schedule a colonoscopy Learning About Diabetes Food Guidelines Your Care Instructions Meal planning is important to manage diabetes. It helps keep your blood sugar at a target level (which you set with your doctor). You don't have to eat special foods. You can eat what your family eats, including sweets once in a while. But you do have to pay attention to how often you eat and how much you eat of certain foods. You may want to work with a dietitian or a certified diabetes educator (CDE) to help you plan meals and snacks. A dietitian or CDE can also help you lose weight if that is one of your goals. What should you know about eating carbs? Managing the amount of carbohydrate (carbs) you eat is an important part of healthy meals when you have diabetes. Carbohydrate is found in many foods. · Learn which foods have carbs. And learn the amounts of carbs in different foods. ? Bread, cereal, pasta, and rice have about 15 grams of carbs in a serving. A serving is 1 slice of bread (1 ounce), ½ cup of cooked cereal, or 1/3 cup of cooked pasta or rice. ? Fruits have 15 grams of carbs in a serving. A serving is 1 small fresh fruit, such as an apple or orange; ½ of a banana; ½ cup of cooked or canned fruit; ½ cup of fruit juice; 1 cup of melon or raspberries; or 2 tablespoons of dried fruit. ? Milk and no-sugar-added yogurt have 15 grams of carbs in a serving. A serving is 1 cup of milk or 2/3 cup of no-sugar-added yogurt. ? Starchy vegetables have 15 grams of carbs in a serving. A serving is ½ cup of mashed potatoes or sweet potato; 1 cup winter squash; ½ of a small baked potato; ½ cup of cooked beans; or ½ cup cooked corn or green peas. · Learn how much carbs to eat each day and at each meal. A dietitian or CDE can teach you how to keep track of the amount of carbs you eat. This is called carbohydrate counting.  
· If you are not sure how to count carbohydrate grams, use the Plate Method to plan meals. It is a good, quick way to make sure that you have a balanced meal. It also helps you spread carbs throughout the day. ? Divide your plate by types of foods. Put non-starchy vegetables on half the plate, meat or other protein food on one-quarter of the plate, and a grain or starchy vegetable in the final quarter of the plate. To this you can add a small piece of fruit and 1 cup of milk or yogurt, depending on how many carbs you are supposed to eat at a meal. 
· Try to eat about the same amount of carbs at each meal. Do not \"save up\" your daily allowance of carbs to eat at one meal. 
· Proteins have very little or no carbs per serving. Examples of proteins are beef, chicken, turkey, fish, eggs, tofu, cheese, cottage cheese, and peanut butter. A serving size of meat is 3 ounces, which is about the size of a deck of cards. Examples of meat substitute serving sizes (equal to 1 ounce of meat) are 1/4 cup of cottage cheese, 1 egg, 1 tablespoon of peanut butter, and ½ cup of tofu. How can you eat out and still eat healthy? · Learn to estimate the serving sizes of foods that have carbohydrate. If you measure food at home, it will be easier to estimate the amount in a serving of restaurant food. · If the meal you order has too much carbohydrate (such as potatoes, corn, or baked beans), ask to have a low-carbohydrate food instead. Ask for a salad or green vegetables. · If you use insulin, check your blood sugar before and after eating out to help you plan how much to eat in the future. · If you eat more carbohydrate at a meal than you had planned, take a walk or do other exercise. This will help lower your blood sugar. What else should you know? · Limit saturated fat, such as the fat from meat and dairy products. This is a healthy choice because people who have diabetes are at higher risk of heart disease.  So choose lean cuts of meat and nonfat or low-fat dairy products. Use olive or canola oil instead of butter or shortening when cooking. · Don't skip meals. Your blood sugar may drop too low if you skip meals and take insulin or certain medicines for diabetes. · Check with your doctor before you drink alcohol. Alcohol can cause your blood sugar to drop too low. Alcohol can also cause a bad reaction if you take certain diabetes medicines. Follow-up care is a key part of your treatment and safety. Be sure to make and go to all appointments, and call your doctor if you are having problems. It's also a good idea to know your test results and keep a list of the medicines you take. Where can you learn more? Go to http://raz-gabe.info/. Enter N577 in the search box to learn more about \"Learning About Diabetes Food Guidelines. \" Current as of: April 16, 2019 Content Version: 12.2 © 6946-3078 [employer]. Care instructions adapted under license by [employer] (which disclaims liability or warranty for this information). If you have questions about a medical condition or this instruction, always ask your healthcare professional. [address] any warranty or liability for your use of this information.

## 2019-10-18 LAB
ALBUMIN/CREAT UR: 15.1 MG/G CREAT (ref 0–30)
CREAT UR-MCNC: 46.9 MG/DL
MICROALBUMIN UR-MCNC: 7.1 UG/ML

## 2019-12-18 RX ORDER — ERGOCALCIFEROL 1.25 MG/1
CAPSULE ORAL
Qty: 12 CAP | Refills: 3 | Status: SHIPPED | OUTPATIENT
Start: 2019-12-18 | End: 2020-12-16 | Stop reason: SDUPTHER

## 2020-03-18 ENCOUNTER — TELEPHONE (OUTPATIENT)
Dept: FAMILY MEDICINE CLINIC | Age: 60
End: 2020-03-18

## 2020-03-18 RX ORDER — METFORMIN HYDROCHLORIDE 500 MG/1
TABLET, EXTENDED RELEASE ORAL
Qty: 180 TAB | Refills: 0 | Status: SHIPPED | OUTPATIENT
Start: 2020-03-18 | End: 2020-05-27

## 2020-05-27 RX ORDER — METFORMIN HYDROCHLORIDE 500 MG/1
TABLET, EXTENDED RELEASE ORAL
Qty: 180 TAB | Refills: 0 | Status: SHIPPED | OUTPATIENT
Start: 2020-05-27 | End: 2020-08-21

## 2020-09-23 NOTE — DISCHARGE INSTRUCTIONS
HOME DISCHARGE INSTRUCTIONS    [facility] / [id number] : 1960    Admission date: 2018 Discharge date: 2018     Please bring this form with you to show your care provider at your follow-up appointment. Primary care provider:  Mike West DO    Discharging provider:  Severo Arch, MD  - Family Medicine Resident  Dr. Miranda Yuan  - Attending, Family Medicine     You have been admitted to the hospital with the following diagnoses:    ACUTE DIAGNOSES:  Transaminitis  . . . . . . . . . . . . . . . . . . . . . . . . . . . . . . . . . . . . . . . . . . . . . . . . . . . . . . . . . . . . . . . . . . . . . . . .     Medications:   -STOP taking Keflex    FOLLOW-UP CARE RECOMMENDATIONS:    Appointments  Follow-up Information     Follow up With Details Comments [address] Eshetu, MD Go on 2018 appt at 10:45 AM please arrive 15 minutes early [address] [place] [address]      Shelby Nielsen MD Schedule an appointment as soon as possible for a visit follow up of joint pain  [address] [place] [address]  [phone]             Follow-up tests needed: repeat CMP in 10-14 days     Pending test results: At the time of your discharge the following test results are still pending: JAMESON, mitrochondrial AB, smooth muscle antibody   Please make sure you review these results with your outpatient follow-up provider(s). Specific symptoms to watch for: chest pain, shortness of breath, fever, chills, nausea, vomiting, diarrhea, change in mentation, falling, weakness, bleeding. DIET/what to eat:  Regular Diet    ACTIVITY:  Activity as tolerated    Wound care: none    Equipment needed:  none    What to do if new or unexpected symptoms occur? If you experience any of the above symptoms (or should other concerns or questions arise after discharge) please call your primary care physician.  Return to the emergency room if you cannot get hold of your doctor. · It is very important that you keep your follow-up appointment(s). · Please bring discharge papers, medication list (and/or medication bottles) to your follow-up appointments for review by your outpatient provider(s). · Please check the list of medications and be sure it includes every medication (even non-prescription medications) that your provider wants you to take. · It is important that you take the medication exactly as they are prescribed. · Keep your medication in the bottles provided by the pharmacist and keep a list of the medication names, dosages, and times to be taken in your wallet. · Do not take other medications without consulting your doctor. · If you have any questions about your medications or other instructions, please talk to your nurse or care provider before you leave the hospital.     Information obtained by:     I understand that if any problems occur once I am at home I am to contact my physician. These instructions were explained to me and I had the opportunity to ask questions. I understand and acknowledge receipt of the instructions indicated above.                                                                                                                                                Physician's or R.N.'s Signature                                                                  Date/Time                                                                                                                                              Patient or Representative Signature                                                          Date/Time 50

## 2020-11-06 ENCOUNTER — PATIENT MESSAGE (OUTPATIENT)
Dept: FAMILY MEDICINE CLINIC | Age: 60
End: 2020-11-06

## 2020-11-09 NOTE — PROGRESS NOTES
TRANSFER - IN REPORT: 
 
Verbal report received from [address] [place] [address], RN(name) on Catherine Eldridge  being received from ED(unit) for routine progression of care Report consisted of patients Situation, Background, Assessment and  
Recommendations(SBAR). Information from the following report(s) SBAR, Kardex, ED Summary, Procedure Summary, Intake/Output, MAR, Recent Results and Med Rec Status was reviewed with the receiving nurse. Opportunity for questions and clarification was provided. Assessment completed upon patients arrival to unit and care assumed. [Negative] : Heme/Lymph

## 2020-11-10 RX ORDER — METFORMIN HYDROCHLORIDE 500 MG/1
TABLET, EXTENDED RELEASE ORAL
Qty: 60 TAB | Refills: 0 | Status: SHIPPED | OUTPATIENT
Start: 2020-11-10 | End: 2020-12-23

## 2020-11-10 NOTE — TELEPHONE ENCOUNTER
From: Suzi Addison  To: Srini Lazaro DO  Sent: 11/6/2020 9:18 PM EST  Subject: Prescription Question    I see in mychart you approved my refill on my prescription for metformin. .. But the pharmacist never received the prescription. .. And when they called they were told it was denied. ...can you resend it ? Please. .. I am out of them.   Thank you  Leroy Ge

## 2020-11-12 ENCOUNTER — VIRTUAL VISIT (OUTPATIENT)
Dept: FAMILY MEDICINE CLINIC | Age: 60
End: 2020-11-12
Payer: COMMERCIAL

## 2020-11-12 DIAGNOSIS — Z12.31 ENCOUNTER FOR SCREENING MAMMOGRAM FOR MALIGNANT NEOPLASM OF BREAST: ICD-10-CM

## 2020-11-12 DIAGNOSIS — Z00.00 ANNUAL PHYSICAL EXAM: Primary | ICD-10-CM

## 2020-11-12 DIAGNOSIS — E55.9 VITAMIN D DEFICIENCY: ICD-10-CM

## 2020-11-12 DIAGNOSIS — E11.9 CONTROLLED TYPE 2 DIABETES MELLITUS WITHOUT COMPLICATION, WITHOUT LONG-TERM CURRENT USE OF INSULIN (HCC): ICD-10-CM

## 2020-11-12 DIAGNOSIS — Z12.11 COLON CANCER SCREENING: ICD-10-CM

## 2020-11-12 PROCEDURE — 99213 OFFICE O/P EST LOW 20 MIN: CPT | Performed by: STUDENT IN AN ORGANIZED HEALTH CARE EDUCATION/TRAINING PROGRAM

## 2020-11-12 PROCEDURE — 99396 PREV VISIT EST AGE 40-64: CPT | Performed by: STUDENT IN AN ORGANIZED HEALTH CARE EDUCATION/TRAINING PROGRAM

## 2020-11-12 NOTE — PATIENT INSTRUCTIONS
Recombinant Zoster (Shingles) Vaccine: What You Need to Know Why get vaccinated? Recombinant zoster (shingles) vaccine can prevent shingles. Shingles (also called herpes zoster, or just zoster) is a painful skin rash, usually with blisters. In addition to the rash, shingles can cause fever, headache, chills, or upset stomach. More rarely, shingles can lead to pneumonia, hearing problems, blindness, brain inflammation (encephalitis), or death. The most common complication of shingles is long-term nerve pain called postherpetic neuralgia (PHN). PHN occurs in the areas where the shingles rash was, even after the rash clears up. It can last for months or years after the rash goes away. The pain from PHN can be severe and debilitating. About 10 to 18% of people who get shingles will experience PHN. The risk of PHN increases with age. An older adult with shingles is more likely to develop PHN and have longer lasting and more severe pain than a younger person with shingles. Shingles is caused by the varicella zoster virus, the same virus that causes chickenpox. After you have chickenpox, the virus stays in your body and can cause shingles later in life. Shingles cannot be passed from one person to another, but the virus that causes shingles can spread and cause chickenpox in someone who had never had chickenpox or received chickenpox vaccine. Recombinant shingles vaccine Recombinant shingles vaccine provides strong protection against shingles. By preventing shingles, recombinant shingles vaccine also protects against PHN. Recombinant shingles vaccine is the preferred vaccine for the prevention of shingles. However, a different vaccine, live shingles vaccine, may be used in some circumstances. The recombinant shingles vaccine is recommended for adults 50 years and older without serious immune problems. It is given as a two-dose series.  
This vaccine is also recommended for people who have already gotten another type of shingles vaccine, the live shingles vaccine. There is no live virus in this vaccine. Shingles vaccine may be given at the same time as other vaccines. Talk with your health care provider Tell your vaccine provider if the person getting the vaccine: 
· Has had an allergic reaction after a previous dose of recombinant shingles vaccine, or has any severe, life-threatening allergies. · Is pregnant or breastfeeding. · Is currently experiencing an episode of shingles. In some cases, your health care provider may decide to postpone shingles vaccination to a future visit. People with minor illnesses, such as a cold, may be vaccinated. People who are moderately or severely ill should usually wait until they recover before getting recombinant shingles vaccine. Your health care provider can give you more information. Risks of a vaccine reaction · A sore arm with mild or moderate pain is very common after recombinant shingles vaccine, affecting about 80% of vaccinated people. Redness and swelling can also happen at the site of the injection. · Tiredness, muscle pain, headache, shivering, fever, stomach pain, and nausea happen after vaccination in more than half of people who receive recombinant shingles vaccine. In clinical trials, about 1 out of 6 people who got recombinant zoster vaccine experienced side effects that prevented them from doing regular activities. Symptoms usually went away on their own in 2 to 3 days. You should still get the second dose of recombinant zoster vaccine even if you had one of these reactions after the first dose. People sometimes faint after medical procedures, including vaccination. Tell your provider if you feel dizzy or have vision changes or ringing in the ears. As with any medicine, there is a very remote chance of a vaccine causing a severe allergic reaction, other serious injury, or death. What if there is a serious problem? An allergic reaction could occur after the vaccinated person leaves the clinic. If you see signs of a severe allergic reaction (hives, swelling of the face and throat, difficulty breathing, a fast heartbeat, dizziness, or weakness), call 9-1-1 and get the person to the nearest hospital. 
For other signs that concern you, call your health care provider. Adverse reactions should be reported to the Vaccine Adverse Event Reporting System (VAERS). Your health care provider will usually file this report, or you can do it yourself. Visit the VAERS website at www.vaers. [facility].gov or call 6-323.344.5141. VAERS is only for reporting reactions, and VAERS staff do not give medical advice. How can I learn more? · Ask your health care provider. · Call your local or state health department. · Contact the Centers for Disease Control and Prevention (CDC): 
? Call 7-710.190.7385 (1-800-CDC-INFO) or 
? Visit CDC's website at www.cdc.gov/vaccines Vaccine Information Statement Recombinant Zoster Vaccine 10/30/2019 Department of [facility] and [employer] Centers for Disease Control and Prevention Many Vaccine Information Statements are available in [place] and other languages. See www.immunize.org/vis. Hojas de Información Sobre Vacunas están disponibles en Español y en muchos otros idiomas. Visite Charanjit.si Care instructions adapted under license by [employer] (which disclaims liability or warranty for this information). If you have questions about a medical condition or this instruction, always ask your healthcare professional. [place] [address] any warranty or liability for your use of this information. Pneumococcal Polysaccharide Vaccine: Care Instructions Your Care Instructions The pneumococcal polysaccharide vaccine (PPSV) can prevent some of the serious complications of pneumonia.  This includes infection in the bloodstream (bacteremia) or throughout the body (septicemia). PPSV is recommended for people ages 72 years and older. People ages 3 to 59 who have a long-term illness should also get the vaccine. This includes people with diabetes, heart disease, liver disease, or lung disease. PPSV can also help people who have a weakened immune system. This includes cancer patients and people who don't have a spleen. The immune system helps your body fight infection and other illnesses. PPSV is given as a shot. It's usually given in the arm. Healthy older adults get the shot once. Other people may need to have a second dose. The shot may cause pain and redness at the site. It may also cause a mild fever for a short time. Follow-up care is a key part of your treatment and safety. Be sure to make and go to all appointments, and call your doctor if you are having problems. It's also a good idea to know your test results and keep a list of the medicines you take. How can you care for yourself at home? · Take an over-the-counter pain medicine, such as acetaminophen (Tylenol), ibuprofen (Advil, Motrin), or naproxen (Aleve), if your arm is sore after the shot. Be safe with medicines. Read and follow all instructions on the label. · Give acetaminophen (Tylenol) or ibuprofen (Advil, Motrin) to your child for pain or fussiness after the shot. Read and follow all instructions on the label. Do not give aspirin to anyone younger than 20. It has been linked to Reye syndrome, a serious illness. · Put ice or a cold pack on the sore area for 10 to 20 minutes at a time. Put a thin cloth between the ice and your skin. When should you call for help? Call 911 anytime you think you may need emergency care. For example, call if: 
  · You have a seizure.  
  · You have symptoms of a severe allergic reaction. These may include: 
? Sudden raised, red areas (hives) all over the body. ? Swelling of the throat, mouth, lips, or tongue. ? Trouble breathing. ? Passing out (losing consciousness). Or you may feel very lightheaded or suddenly feel weak, confused, or restless. Call your doctor now or seek immediate medical care if: 
  · You have symptoms of an allergic reaction, such as: ? A rash or hives (raised, red areas on the skin). ? Itching. ? Swelling. ? Belly pain, nausea, or vomiting.  
  · You have a high fever. Watch closely for changes in your health, and be sure to contact your doctor if you have any problems. Where can you learn more? Go to http://www.gray.com/ Enter T225 in the search box to learn more about \"Pneumococcal Polysaccharide Vaccine: Care Instructions. \" Current as of: December 9, 2019               Content Version: 12.6 © 3869-4350 [employer]. Care instructions adapted under license by [employer] (which disclaims liability or warranty for this information). If you have questions about a medical condition or this instruction, always ask your healthcare professional. [address] any warranty or liability for your use of this information.

## 2020-11-12 NOTE — PROGRESS NOTES
Shamir Vogel  61 y.o. female  1960  [facility]  [id number]   [address]:    Telemedicine Progress Note  Ryan Costa MD       Encounter Date and Time: November 12, 2020 at 9:01 AM    Consent:  She and/or the health care decision maker is aware that that she may receive a bill for this telephone service, depending on her insurance coverage, and has provided verbal consent to proceed: YES    Chief Complaint   Patient presents with    Complete Physical     History of Present Illness   Shamir Vogel is a 61 y.o. female was evaluated by synchronous (real-time) audio-video technology from home, through the [address] [employer] Patient Portal.    Health Maintenance  - Due for mammogram  - never had a colonosocpy but father had rectal and colon cancer  - never gets flu shot  - has not had shingrix nor pneumococcal immunizations  - last PAP 12/2017 NILM HPV neg; due 12/2022    DM  - Takes Metformin 500mg BID for several years  - Last few A1c results <5.5   - has infrequent episodes of both hyper and hypoglycemia and recognizes the symptoms; hyper glycemia usually after eating ice cream, dessert  - test BG multiple times a day and fasting levels are typically in the 80s  - no falls  - has had reactions to \"multiple medications\" for cholesterol - not listed in allergies  - tries to follow a low carbohydrate diet and prepares keto desserts as she has a sweet tooth    Blood Pressure  - takes it at home daily and most recent was 118/84  - denies CP, palpitations, HA, LE edema    Review of Systems   Review of Systems   Constitutional: Negative for malaise/fatigue and weight loss. Eyes: Negative for blurred vision and double vision. Respiratory: Negative for cough and shortness of breath. Cardiovascular: Negative for chest pain and palpitations. Gastrointestinal: Negative for abdominal pain, diarrhea, nausea and vomiting.    Musculoskeletal: Negative for falls, joint pain and myalgias. Neurological: Negative for dizziness and headaches. Psychiatric/Behavioral: Negative for depression. The patient does not have insomnia. Vitals/Objective:     General: alert, cooperative, no distress, appears stated age, mildly obese   Mental  status: mental status: alert, oriented to person, place, and time, normal mood, behavior, speech, dress, motor activity, and thought processes   Resp: resp: normal effort and no respiratory distress   Neuro: neuro: no gross deficits   Skin: skin: no discoloration or lesions of concern on visible areas   Due to this being a TeleHealth evaluation, many elements of the physical examination are unable to be assessed. 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 Reviewed H topics and placed orders for screening and followup labs based on age, sex and comorbidities. Patient declined flu vaccine. Concerned that she may be at increased risk for falls given low A1cs and continued use of metformin. Reassured patient that checking her BGs is not necessary as she is not on insulin. Will continue to monitor BP as diastolic was 84 on reading today - reviewed BP goals with patient who will followup if she has readings persistently > 130/80. 1. Annual physical exam  - will need pneumococcal vaccine and shingrix when she presents for labs    2. Colon cancer screening  - REFERRAL TO GASTROENTEROLOGY    3. Encounter for screening mammogram for malignant neoplasm of breast  - HERMINIO MAMMO BI SCREENING INCL CAD; Future    4. Controlled type 2 diabetes mellitus without complication, without long-term current use of insulin ([address]  - LIPID PANEL; Future  - TSH 3RD GENERATION; Future  - HEMOGLOBIN A1C WITH EAG; Future  - MICROALBUMIN, UR, RAND W/ MICROALB/CREAT RATIO; Future    5.  Vitamin D deficiency  - VITAMIN D, 25 HYDROXY; Future      Time spent in direct conversation with the patient to include medical condition(s) discussed, assessment and treatment plan:       We discussed the expected course, resolution and complications of the diagnosis(es) in detail. Medication risks, benefits, costs, interactions, and alternatives were discussed as indicated. I advised her to contact the office if her condition worsens, changes or fails to improve as anticipated. She expressed understanding with the diagnosis(es) and plan. Patient understands that this encounter was a temporary measure, and the importance of further follow up and examination was emphasized. Patient verbalized understanding. Patient informed to follow up: [facility] Follow-up and Dispositions  ·   Return in about 6 months (around 5/12/2021), or diabetes and blood pressure. Electronically Signed: Ryan Costa MD    Providers location when delivering service: home    CPT Codes 26658-32970 for Established Patients may apply to this Telehealth Visit. POS code: 18. Modifier GT      Pursuant to the emergency declaration under the [address] [place] [address], [facility] waiver authority and the [employer] Act, this Virtual  Visit was conducted, with patient's consent, to reduce the patient's risk of exposure to COVID-19 and provide continuity of care for an established patient. Services were provided through a video synchronous discussion virtually to substitute for in-person clinic visit. History   Patients past medical, surgical and family histories were reviewed and updated.       Past Medical History:   Diagnosis Date    Diabetes ([facility]     Neurological disorder     migraines    Sarcoidosis     Stickler's syndrome     daughter has-pt may have (genetic connective tissue disorder)    Strabismus      Past Surgical History:   Procedure Laterality Date    HX DILATION AND CURETTAGE      HX ORTHOPAEDIC Right     wrist    HX TONSILLECTOMY       Family History   Problem Relation Age of Onset    Breast Cancer Sister    [employer] Arthritis-rheumatoid Mother     Other Brother         Sarcoidosis    Cancer Father         colon/rectal    Heart Attack Father      Social History     Socioeconomic History    Marital status:      Spouse name: Not on file    Number of children: Not on file    Years of education: Not on file    Highest education level: Not on file   Occupational History    Not on file   Social Needs    Financial resource strain: Not on file    Food insecurity     Worry: Not on file     Inability: Not on file   [employer] needs     Medical: Not on file     Non-medical: Not on file   Tobacco Use    Smoking status: Former Smoker    Smokeless tobacco: Never Used   Substance and Sexual Activity    Alcohol use: No    Drug use: No    Sexual activity: Yes     Partners: Male   Lifestyle    Physical activity     Days per week: Not on file     Minutes per session: Not on file    Stress: Not on file   Relationships    Social connections     Talks on phone: Not on file     Gets together: Not on file     Attends Holiness service: Not on file     Active member of club or organization: Not on file     Attends meetings of clubs or organizations: Not on file     Relationship status: Not on file    Intimate partner violence     Fear of current or ex partner: Not on file     Emotionally abused: Not on file     Physically abused: Not on file     Forced sexual activity: Not on file   Other Topics Concern    Not on file   Social History Narrative    Not on file     Patient Active Problem List   Diagnosis Code    Distal radius fracture S52.509A    Transaminitis R74.01    Acute hepatitis B17.9    Hepatic steatosis K76.0    Stenosing tenosynovitis of finger of right hand M65.841    History of sarcoidosis Z86.2    Carpal tunnel syndrome of left wrist G56.02    Seronegative rheumatoid arthritis of multiple sites ([facility] M06.09    Vitamin D deficiency E55.9    Long-term use of immunosuppressant medication Z79.899    Diabetes mellitus ([facility] E11.9    Sarcoidosis D86.9          Current Medications/Allergies   Medications and Allergies reviewed:    Current Outpatient Medications   Medication Sig Dispense Refill    metFORMIN ER (GLUCOPHAGE XR) 500 mg tablet Take with food twice daily. 60 Tab 0    VITAMIN D2 50,000 unit capsule take one capsule by mouth every seven (7) days 12 Cap 3    glucose blood VI test strips (TRUE METRIX GLUCOSE TEST STRIP) strip True Metrix Glucose Test Strip      lancets (TRUEPLUS LANCETS) 33 gauge misc TRUEplus Lancets 33 gauge      mometasone (NASONEX) 50 mcg/actuation nasal spray 2 Sprays by Nasal route.  Cetirizine (ZYRTEC) 10 mg cap Take  by mouth daily.  omeprazole (PRILOSEC) 20 mg capsule Take 20 mg by mouth daily.        Allergies   Allergen Reactions    Latex Itching     inflammation    Augmentin [Amoxicillin-Pot Clavulanate] Hives    Keflex [Cephalexin] Other (comments)     Elevated LFT's

## 2020-11-12 NOTE — Clinical Note
Please call patient to schedule lab appt. She also needs shingrix and pneumococcal vaccines. Thank you!

## 2020-11-12 NOTE — Clinical Note
GI referral for colonoscopy. Patient has office number to make appt and I asked her to call us once she has made the appt. Thanks!

## 2020-11-13 NOTE — PROGRESS NOTES
PAIN REASSESSMENT  Reports that pain is a 4/10    Resting on cart, nondistressed  GCS=15    Mother remains present at . [address]  02/28/20 4621 Updated Gastro referral order and sent over to [facility] for scheduling     Close enc

## 2020-11-17 ENCOUNTER — CLINICAL SUPPORT (OUTPATIENT)
Dept: FAMILY MEDICINE CLINIC | Age: 60
End: 2020-11-17
Payer: COMMERCIAL

## 2020-11-17 ENCOUNTER — HOSPITAL ENCOUNTER (OUTPATIENT)
Dept: LAB | Age: 60
Discharge: HOME | End: 2020-11-17
Payer: COMMERCIAL

## 2020-11-17 DIAGNOSIS — E55.9 VITAMIN D DEFICIENCY: ICD-10-CM

## 2020-11-17 DIAGNOSIS — Z23 ENCOUNTER FOR IMMUNIZATION: Primary | ICD-10-CM

## 2020-11-17 DIAGNOSIS — E11.9 CONTROLLED TYPE 2 DIABETES MELLITUS WITHOUT COMPLICATION, WITHOUT LONG-TERM CURRENT USE OF INSULIN (HCC): ICD-10-CM

## 2020-11-17 PROCEDURE — 90732 PPSV23 VACC 2 YRS+ SUBQ/IM: CPT | Performed by: FAMILY MEDICINE

## 2020-11-17 PROCEDURE — 90472 IMMUNIZATION ADMIN EACH ADD: CPT | Performed by: FAMILY MEDICINE

## 2020-11-17 PROCEDURE — 90471 IMMUNIZATION ADMIN: CPT | Performed by: FAMILY MEDICINE

## 2020-11-17 PROCEDURE — 90736 HZV VACCINE LIVE SUBQ: CPT | Performed by: FAMILY MEDICINE

## 2020-11-17 PROCEDURE — 82043 UR ALBUMIN QUANTITATIVE: CPT

## 2020-11-18 LAB
25(OH)D3+25(OH)D2 SERPL-MCNC: 39.7 NG/ML (ref 30–100)
ALBUMIN/CREAT UR: <24 MG/G CREAT (ref 0–29)
CHOLEST SERPL-MCNC: 244 MG/DL (ref 100–199)
CREAT UR-MCNC: 12.7 MG/DL
EST. AVERAGE GLUCOSE BLD GHB EST-MCNC: 100 MG/DL
HBA1C MFR BLD: 5.1 % (ref 4.8–5.6)
HDLC SERPL-MCNC: 55 MG/DL
INTERPRETATION, 910389: NORMAL
LDLC SERPL CALC-MCNC: 152 MG/DL (ref 0–99)
Lab: NORMAL
Lab: NORMAL
MICROALBUMIN UR-MCNC: <3 UG/ML
TRIGL SERPL-MCNC: 202 MG/DL (ref 0–149)
TSH SERPL DL<=0.005 MIU/L-ACNC: 1.04 UIU/ML (ref 0.45–4.5)
VLDLC SERPL CALC-MCNC: 37 MG/DL (ref 5–40)

## 2020-12-16 RX ORDER — ERGOCALCIFEROL 1.25 MG/1
50000 CAPSULE ORAL
Qty: 12 CAP | Refills: 5 | Status: SHIPPED | OUTPATIENT
Start: 2020-12-16

## 2020-12-16 RX ORDER — METFORMIN HYDROCHLORIDE 500 MG/1
TABLET, EXTENDED RELEASE ORAL
Qty: 60 TAB | Refills: 0 | Status: CANCELLED | OUTPATIENT
Start: 2020-12-16

## 2020-12-23 RX ORDER — METFORMIN HYDROCHLORIDE 500 MG/1
TABLET, EXTENDED RELEASE ORAL
Qty: 180 TAB | Refills: 0 | Status: SHIPPED | OUTPATIENT
Start: 2020-12-23 | End: 2021-04-12

## 2020-12-24 ENCOUNTER — TELEPHONE (OUTPATIENT)
Dept: FAMILY MEDICINE CLINIC | Age: 60
End: 2020-12-24

## 2020-12-24 NOTE — TELEPHONE ENCOUNTER
----- Message from Ashlee Pacheco sent at 12/24/2020 12:01 PM EST -----  Regarding: Dr. Levon Jenkins telephone  General Message/Vendor Calls    Caller's first and last name: Pt       Reason for call: Pt informing PCP that pharmacy on file has not received her metformin rx         Callback required yes/no and why: yes       Best contact number(s): [phone]      Details to clarify the request: Receipt confirmed by pharmacy (12/23/2020  5:23 PM EST)      Ashlee Pacheco

## 2021-03-09 RX ORDER — METFORMIN HYDROCHLORIDE 500 MG/1
TABLET, EXTENDED RELEASE ORAL
Qty: 90 TAB | Refills: 0 | Status: SHIPPED | OUTPATIENT
Start: 2021-03-09 | End: 2021-04-12

## 2021-04-12 RX ORDER — METFORMIN HYDROCHLORIDE 500 MG/1
TABLET, EXTENDED RELEASE ORAL
Qty: 90 TAB | Refills: 0 | Status: SHIPPED | OUTPATIENT
Start: 2021-04-12 | End: 2021-09-10 | Stop reason: SDUPTHER

## 2021-09-13 ENCOUNTER — TELEPHONE (OUTPATIENT)
Dept: FAMILY MEDICINE CLINIC | Age: 61
End: 2021-09-13

## 2021-09-13 NOTE — TELEPHONE ENCOUNTER
----- Message from Nery German MD sent at 9/13/2021  9:33 AM EDT -----  Regarding: Appt  Good morning,     This pt needs to be seen in person for Diabetes follow up. Med refill sent for 1-2 months only. We wont be able to give more refill until seeing in clinic. Thank you.      Dr. Herb Nesbitt

## 2021-09-27 ENCOUNTER — OFFICE VISIT (OUTPATIENT)
Dept: FAMILY MEDICINE CLINIC | Age: 61
End: 2021-09-27
Payer: COMMERCIAL

## 2021-09-27 VITALS
BODY MASS INDEX: 25.87 KG/M2 | HEART RATE: 106 BPM | SYSTOLIC BLOOD PRESSURE: 103 MMHG | HEIGHT: 63 IN | RESPIRATION RATE: 17 BRPM | TEMPERATURE: 97.3 F | OXYGEN SATURATION: 96 % | DIASTOLIC BLOOD PRESSURE: 78 MMHG | WEIGHT: 146 LBS

## 2021-09-27 DIAGNOSIS — Z23 ENCOUNTER FOR IMMUNIZATION: ICD-10-CM

## 2021-09-27 DIAGNOSIS — R03.0 ELEVATED BP WITHOUT DIAGNOSIS OF HYPERTENSION: ICD-10-CM

## 2021-09-27 DIAGNOSIS — E11.8 CONTROLLED TYPE 2 DIABETES MELLITUS WITH COMPLICATION, WITHOUT LONG-TERM CURRENT USE OF INSULIN (HCC): ICD-10-CM

## 2021-09-27 DIAGNOSIS — E11.8 CONTROLLED TYPE 2 DIABETES MELLITUS WITH COMPLICATION, WITHOUT LONG-TERM CURRENT USE OF INSULIN (HCC): Primary | ICD-10-CM

## 2021-09-27 PROCEDURE — 99214 OFFICE O/P EST MOD 30 MIN: CPT | Performed by: STUDENT IN AN ORGANIZED HEALTH CARE EDUCATION/TRAINING PROGRAM

## 2021-09-27 PROCEDURE — 90750 HZV VACC RECOMBINANT IM: CPT | Performed by: STUDENT IN AN ORGANIZED HEALTH CARE EDUCATION/TRAINING PROGRAM

## 2021-09-27 PROCEDURE — 90686 IIV4 VACC NO PRSV 0.5 ML IM: CPT | Performed by: STUDENT IN AN ORGANIZED HEALTH CARE EDUCATION/TRAINING PROGRAM

## 2021-09-27 RX ORDER — LORATADINE 10 MG/1
10 TABLET ORAL
COMMUNITY

## 2021-09-27 RX ORDER — PRAVASTATIN SODIUM 10 MG/1
10 TABLET ORAL
Qty: 30 TABLET | Refills: 1 | Status: SHIPPED | OUTPATIENT
Start: 2021-09-27 | End: 2021-11-10

## 2021-09-27 RX ORDER — LISINOPRIL 5 MG/1
5 TABLET ORAL DAILY
Qty: 30 TABLET | Refills: 0 | Status: SHIPPED | OUTPATIENT
Start: 2021-09-27 | End: 2021-09-27

## 2021-09-27 NOTE — PROGRESS NOTES
Subjective:      Enoc Urrutia is a 64 y.o. female who presents for diabetes follow up. Current regimen metformin 500mg daily, decreased from BID in 4/2021. In 11/2017 A1c was 12.7, improved to 5.3 by 4/2018 and has been in the normal A1c range since after patient lost 20lbs. Has gained 16lbs since last visit in April 2021.     - fasting sugars 100-120s, did have a max of 200  - BP at home in the 130-140s/80s, not on BP meds  - Has not been exercising as regularly, however continues to work on a diabetic diet       [facility]   1. Previous lab work reviewed - 11/2020   - Last A1c: 5.1   - Last lipid panel: Tchol 244,    - Last urine Microalbumin/Creatinine: < 24   - Last creatinine level: 0.56 (3/2019)  2. Patient on ASA - no  3. Patient on Statin- no (muscle aches with statins in the past)  4. Patient on ACEI/ARB- no   5. Patient attempting to follow diabetic diet - yes  6. Last eye check - has appointment next week  7. Last comprehensive foot exam - last done 10/2019  8. Last flu shot: will do today  9. Last Pneumococcal vaccination: 11/2020 (will need PPSV 13 after 11/2021)      PMHx:  Past Medical History:   Diagnosis Date    Diabetes ([facility]     Neurological disorder     migraines    Sarcoidosis     Stickler's syndrome     daughter has-pt may have (genetic connective tissue disorder)    Strabismus        Meds:   Current Outpatient Medications   Medication Sig Dispense Refill    loratadine (Claritin) 10 mg tablet Take 10 mg by mouth.  pravastatin (PRAVACHOL) 10 mg tablet Take 1 Tablet by mouth nightly. 30 Tablet 1    metFORMIN ER (GLUCOPHAGE XR) 500 mg tablet TAKE ONE TABLET BY MOUTH ONE TIME DAILY WITH FOOD 60 Tablet 0    ergocalciferol (Vitamin D2) 1,250 mcg (50,000 unit) capsule Take 1 Cap by mouth every seven (7) days.  12 Cap 5    glucose blood VI test strips (TRUE METRIX GLUCOSE TEST STRIP) strip True Metrix Glucose Test Strip      lancets (TRUEPLUS LANCETS) 33 gauge misc TRUEplus Lancets 33 gauge      omeprazole (PRILOSEC) 20 mg capsule Take 20 mg by mouth daily.  mometasone (NASONEX) 50 mcg/actuation nasal spray 2 Sprays by Nasal route. (Patient not taking: Reported on 9/27/2021)      Cetirizine (ZYRTEC) 10 mg cap Take  by mouth daily. (Patient not taking: Reported on 9/27/2021)         Allergies: Allergies   Allergen Reactions    Latex Itching     inflammation    Augmentin [Amoxicillin-Pot Clavulanate] Hives    Keflex [Cephalexin] Other (comments)     Elevated LFT's       Smoker:  Social History     Tobacco Use   Smoking Status Former Smoker   Smokeless Tobacco Never Used       ETOH:   Social History     Substance and Sexual Activity   Alcohol Use No       FH:   Family History   Problem Relation Age of Onset    Breast Cancer Sister    [employer] Arthritis-rheumatoid Mother     Other Brother         Sarcoidosis    Cancer Father         colon/rectal    Heart Attack Father          Objective:     Visit Vitals  /78   Pulse (!) 106   Temp 97.3 °F (36.3 °C) (Temporal)   Resp 17   Ht 5' 3\" (1.6 m)   Wt 146 lb (66.2 kg)   SpO2 96%   BMI 25.86 kg/m²       Physical Examination:   GEN: No apparent distress. Alert and oriented and responds to all questions appropriately. NECK:  Supple; no masses; thyroid normal           LUNGS: Respirations unlabored; clear to auscultation bilaterally  CARDIOVASCULAR: Regular, rate, and rhythm without murmurs, gallops or rubs   ABDOMEN: Soft; nontender; nondistended; normoactive bowel sounds; no masses or organomegaly  EXT: Well perfused. No edema. SKIN: No obvious rashes.   FOOT EXAM: Diabetic foot exam:    Left: Vibratory sensation normal    Proprioception normal    Sharp/dull discrimination normal    Filament test normal sensation with micro filament    Pulse DP: 2+ (normal)    Pulse PT: 2+ (normal)    Deformities: None      Right: Vibratory sensation normal    Proprioception normal    Sharp/dull discrimination normal    Filament test normal sensation with micro filament    Pulse DP: 2+ (normal)    Pulse PT: 2+ (normal)    Deformities: None    CNS:   No obvious cranial nerve deficit    Peripheral sensations intact in all 4 extremities    Power equal in all 4 extremities    No gross focal deficits      Assessment:       ICD-10-CM ICD-9-CM    1. Controlled type 2 diabetes mellitus with complication, without long-term current use of insulin ([facility])  E11.8 250.90 LIPID PANEL      HEMOGLOBIN A1C WITH EAG      MICROALBUMIN, UR, RAND W/ MICROALB/CREAT RATIO      METABOLIC PANEL, COMPREHENSIVE       DIABETES FOOT EXAM      pravastatin (PRAVACHOL) 10 mg tablet      METABOLIC PANEL, COMPREHENSIVE      HEMOGLOBIN A1C WITH EAG      LIPID PANEL   2. Encounter for immunization  Z23 V03.89 ZOSTER VACC RECOMBINANT ADJUVANTED      VA IMMUNIZ ADMIN,1 SINGLE/COMB VAC/TOXOID      INFLUENZA VIRUS VAC QUAD,SPLIT,PRESV FREE SYRINGE IM      CANCELED: FLU (FLUAD QUAD INFLUENZA VACCINE,QUAD,ADJUVANTED)   3. Elevated BP without diagnosis of hypertension  R03.0 796.2            Plan:     Diagnoses and all orders for this visit:    1. Controlled type 2 diabetes mellitus with complication, without long-term current use of insulin ([facility]): Current regimen metformin 500mg daily.  -     LIPID PANEL; Future  -     HEMOGLOBIN A1C WITH EAG; Future  -     MICROALBUMIN, UR, RAND W/ MICROALB/CREAT RATIO; Future  -     METABOLIC PANEL, COMPREHENSIVE; Future  -      DIABETES FOOT EXAM          -     pravastatin (PRAVACHOL) 10 mg tablet; Take 1 Tablet nightly. - Continue metformin - will increase to BID if A1c elevated  - Yearly ocular exams and annual foot exams         - Patient explained the importance of exercising 30 minutes a day 5 days a week, the importance of a strict diabetic diet to reduce future cardiovascular risk       2.  Encounter for immunization  -     ZOSTER VACC RECOMBINANT ADJUVANTED  -     FLU (FLUAD QUAD INFLUENZA VACCINE,QUAD,ADJUVANTED)  -     Khushi Nieves ADMIN,1 SINGLE/COMB VAC/TOXOID    3. Elevated BP without diagnosis of hypertension: /90, on repeat 103/78. Home BP in 130s/80-90s  - patient to measure BP at home daily and follow up in 2 weeks  - no history of microalbuminuria, will consider lisinopril if BP remains elevated  - Goal BP < 140/90        Discussed with patient today that the goal for their diabetes is to have a HgA1C<7 and ideally as close to 6.5 as possible. We discussed diet and medications. The goal for the cholesterol LDL is less than 70 and HDL>40. Patient is aware of the need for yearly eye exams and to take care of their feet daily. Discussed with patient that blood pressure should be less than 140/90 and watching salt intake is very important.      Patient understands the management plan and agrees to comply with the plan      Signed By:  Panda Goetz DO    Family Medicine Resident

## 2021-09-27 NOTE — PROGRESS NOTES
Chief Complaint   Patient presents with    Diabetes    Well Woman     1. Have you been to the ER, urgent care clinic since your last visit? Hospitalized since your last visit? No    2. Have you seen or consulted any other health care providers outside of the [address] [place] [address] since your last visit? Include any pap smears or colon screening.  No

## 2021-09-29 LAB
ALBUMIN SERPL-MCNC: 4.9 G/DL (ref 3.8–4.8)
ALBUMIN/CREAT UR: 13 MG/G CREAT (ref 0–29)
ALBUMIN/GLOB SERPL: 2.1 {RATIO} (ref 1.2–2.2)
ALP SERPL-CCNC: 90 IU/L (ref 44–121)
ALT SERPL-CCNC: 8 IU/L (ref 0–32)
AST SERPL-CCNC: 14 IU/L (ref 0–40)
BILIRUB SERPL-MCNC: <0.2 MG/DL (ref 0–1.2)
BUN SERPL-MCNC: 9 MG/DL (ref 8–27)
BUN/CREAT SERPL: 15 (ref 12–28)
CALCIUM SERPL-MCNC: 9.6 MG/DL (ref 8.7–10.3)
CHLORIDE SERPL-SCNC: 103 MMOL/L (ref 96–106)
CHOLEST SERPL-MCNC: 294 MG/DL (ref 100–199)
CO2 SERPL-SCNC: 25 MMOL/L (ref 20–29)
CREAT SERPL-MCNC: 0.6 MG/DL (ref 0.57–1)
CREAT UR-MCNC: 68.4 MG/DL
EST. AVERAGE GLUCOSE BLD GHB EST-MCNC: 111 MG/DL
GLOBULIN SER CALC-MCNC: 2.3 G/DL (ref 1.5–4.5)
GLUCOSE SERPL-MCNC: 112 MG/DL (ref 65–99)
HBA1C MFR BLD: 5.5 % (ref 4.8–5.6)
HDLC SERPL-MCNC: 54 MG/DL
IMP & REVIEW OF LAB RESULTS: NORMAL
LDLC SERPL CALC-MCNC: 205 MG/DL (ref 0–99)
MICROALBUMIN UR-MCNC: 8.9 UG/ML
POTASSIUM SERPL-SCNC: 4.4 MMOL/L (ref 3.5–5.2)
PROT SERPL-MCNC: 7.2 G/DL (ref 6–8.5)
SODIUM SERPL-SCNC: 141 MMOL/L (ref 134–144)
TRIGL SERPL-MCNC: 185 MG/DL (ref 0–149)
VLDLC SERPL CALC-MCNC: 35 MG/DL (ref 5–40)

## 2021-10-02 NOTE — PROGRESS NOTES
Patient started on pravastatin for LDL, has not tolerated statin in remote past.  She will message me if unable to tolerate and we will consider alternatives.

## 2021-10-05 RX ORDER — METFORMIN HYDROCHLORIDE 500 MG/1
TABLET, EXTENDED RELEASE ORAL
Qty: 60 TABLET | Refills: 0 | Status: SHIPPED | OUTPATIENT
Start: 2021-10-05 | End: 2021-12-22

## 2021-10-12 ENCOUNTER — TELEPHONE (OUTPATIENT)
Dept: FAMILY MEDICINE CLINIC | Age: 61
End: 2021-10-12

## 2021-10-12 ENCOUNTER — OFFICE VISIT (OUTPATIENT)
Dept: FAMILY MEDICINE CLINIC | Age: 61
End: 2021-10-12
Payer: COMMERCIAL

## 2021-10-12 VITALS
WEIGHT: 147 LBS | BODY MASS INDEX: 26.04 KG/M2 | OXYGEN SATURATION: 98 % | RESPIRATION RATE: 16 BRPM | HEART RATE: 107 BPM | SYSTOLIC BLOOD PRESSURE: 115 MMHG | TEMPERATURE: 97.5 F | DIASTOLIC BLOOD PRESSURE: 79 MMHG

## 2021-10-12 DIAGNOSIS — E78.00 PURE HYPERCHOLESTEROLEMIA: ICD-10-CM

## 2021-10-12 DIAGNOSIS — Z01.30 EXAMINATION OF BLOOD PRESSURE: Primary | ICD-10-CM

## 2021-10-12 DIAGNOSIS — R00.0 TACHYCARDIA: ICD-10-CM

## 2021-10-12 PROCEDURE — 99213 OFFICE O/P EST LOW 20 MIN: CPT | Performed by: STUDENT IN AN ORGANIZED HEALTH CARE EDUCATION/TRAINING PROGRAM

## 2021-10-12 NOTE — PROGRESS NOTES
Subjective   Sayra Leger is a 64 y.o. female who presents for follow of elevated blood pressure without history of hypertension. Seen in office on 9/27 and with BP of 139/90. PMH significant for controlled T2DM without microalbuminuria based on 9/27 labs. She is NOT on an ACEI for renal protection at this time. Patient's blood pressure at home has been 101-126/ 74-84. In office /79. Pulse ranging from  as of 10/2018 per chart review, at a baseline of ~90. Patient admittedly drinks a lot of caffeine, 4-6 cups  In AM and 1-2 cups at night. Drinks a lot of water as well. Occasionally it feels like heart is racing, particularly when anxious. Admittedly very anxious at doctors office always, however pulse at home ranging from . started pravastatin at last visit and patient tolerating well considering history of intolerance of other statins in remote past.  Denies leg discomfort. Past Medical History  Past Medical History:   Diagnosis Date    Diabetes ([facility]     Neurological disorder     migraines    Sarcoidosis     Stickler's syndrome     daughter has-pt may have (genetic connective tissue disorder)    Strabismus        Current Medications  Current Outpatient Medications   Medication Sig Dispense Refill    metFORMIN ER (GLUCOPHAGE XR) 500 mg tablet TAKE ONE TABLET BY MOUTH ONE TIME DAILY WITH FOOD  60 Tablet 0    loratadine (Claritin) 10 mg tablet Take 10 mg by mouth.  pravastatin (PRAVACHOL) 10 mg tablet Take 1 Tablet by mouth nightly. 30 Tablet 1    ergocalciferol (Vitamin D2) 1,250 mcg (50,000 unit) capsule Take 1 Cap by mouth every seven (7) days. 12 Cap 5    glucose blood VI test strips (TRUE METRIX GLUCOSE TEST STRIP) strip True Metrix Glucose Test Strip      lancets (TRUEPLUS LANCETS) 33 gauge misc TRUEplus Lancets 33 gauge      omeprazole (PRILOSEC) 20 mg capsule Take 20 mg by mouth daily.       mometasone (NASONEX) 50 mcg/actuation nasal spray 2 Sprays by Nasal route. (Patient not taking: Reported on 9/27/2021)      Cetirizine (ZYRTEC) 10 mg cap Take  by mouth daily. (Patient not taking: Reported on 9/27/2021)         Allergies  Allergies   Allergen Reactions    Latex Itching     inflammation    Augmentin [Amoxicillin-Pot Clavulanate] Hives    Keflex [Cephalexin] Other (comments)     Elevated LFT's       Social History   Social History     Socioeconomic History    Marital status:      Spouse name: Not on file    Number of children: Not on file    Years of education: Not on file    Highest education level: Not on file   Occupational History    Not on file   Tobacco Use    Smoking status: Former Smoker    Smokeless tobacco: Never Used   Substance and Sexual Activity    Alcohol use: No    Drug use: No    Sexual activity: Yes     Partners: Male   Other Topics Concern    Not on file   Social History Narrative    Not on file     Social Determinants of Health     Financial Resource Strain:     Difficulty of Paying Living Expenses:    Food Insecurity:     Worried About Running Out of Food in the Last Year:     Ran Out of Food in the Last Year:    Transportation Needs:     Lack of Transportation (Medical):      Lack of Transportation (Non-Medical):    Physical Activity:     Days of Exercise per Week:     Minutes of Exercise per Session:    Stress:     Feeling of Stress :    Social Connections:     Frequency of Communication with Friends and Family:     Frequency of Social Gatherings with Friends and Family:     Attends Bahai Services:     Active Member of Clubs or Organizations:     Attends Club or Organization Meetings:     Marital Status:    Intimate Partner Violence:     Fear of Current or Ex-Partner:     Emotionally Abused:     Physically Abused:     Sexually Abused:        Family History  Family History   Problem Relation Age of Onset    Breast Cancer Sister     Arthritis-rheumatoid Mother     Other Brother Sarcoidosis    Cancer Father         colon/rectal    Heart Attack Father        Objective   Vital Signs  Visit Vitals  /79 (BP 1 Location: Left upper arm, BP Patient Position: Sitting, BP Cuff Size: Adult)   Pulse (!) 107   Temp 97.5 °F (36.4 °C) (Oral)   Resp 16   Wt 147 lb (66.7 kg)   SpO2 98%   BMI 26.04 kg/m²       Physical Examination  Physical Exam  Cardiovascular:      Rate and Rhythm: Regular rhythm. Tachycardia present. Pulses: Normal pulses. Heart sounds: Normal heart sounds. Pulmonary:      Effort: Pulmonary effort is normal.      Breath sounds: Normal breath sounds. Assessment   Carol Fierro is a 64 y.o. who is here for follow up of isolated elevated blood pressure without hypertension. Home BP at goal.  Patient also with tachycardia likely secondary to excessive caffeine use. Plan   Diagnoses and all orders for this visit:    1. Examination of blood pressure: previously reported high at home into 140s, however over the last 2 weeks with regular monitoring BP at goal of <140/90. Pressures running in the 110s/70-80s primarily.  - Patient to make appointment if pressures consistently higher than 140/90    2. Tachycardia: baseline . Likely secondary to excessive coffee intake (~4-6 cups each AM, 2 cups in PM) or anxiety. Will rule out hyperthyroidism and anemia.  -     CBC W/O DIFF; Future  -     TSH 3RD GENERATION; Future    3. Pure hypercholesterolemia: continue pravastatin and recheck lipids in 3 months  -     LIPID PANEL; Future      Patient is counseled to return to the office if symptoms do not improve as expected. Urgent consultation with the nearest Emergency Department is strongly recommended if condition worsens. Patient is counseled to follow up as recommended and to inform the office if any changes in treatment are recommended.       I discussed this patient with Dr. Hyacinth Matias (Attending Physician)       Signed By:  Arturo Camacho DO    Family Medicine Resident

## 2021-10-12 NOTE — PROGRESS NOTES
Chief Complaint   Patient presents with    Blood Pressure Check     1. Have you been to the ER, urgent care clinic since your last visit? Hospitalized since your last visit? No    2. Have you seen or consulted any other health care providers outside of the [address] [place] [address] since your last visit? Include any pap smears or colon screening.  No

## 2021-10-12 NOTE — TELEPHONE ENCOUNTER
----- Message from Mae Sotelo DO sent at 10/12/2021 10:17 AM EDT -----  Regarding: lab visit 3 months  Needs lipid panel in 3 months. Thanks!

## 2021-10-13 LAB
CHOLEST SERPL-MCNC: 242 MG/DL (ref 100–199)
HDLC SERPL-MCNC: 54 MG/DL
LDLC SERPL CALC-MCNC: 162 MG/DL (ref 0–99)
TRIGL SERPL-MCNC: 146 MG/DL (ref 0–149)
VLDLC SERPL CALC-MCNC: 26 MG/DL (ref 5–40)

## 2021-10-13 NOTE — PROGRESS NOTES
Lipid panel was collected too early mistakenly. Will have lab void charge and recollect in January as planned. No evidence of anemia or hyperthyroidism contributing to tachycardia, likely due to excessive caffeine intake.

## 2021-10-14 LAB
ERYTHROCYTE [DISTWIDTH] IN BLOOD BY AUTOMATED COUNT: 12.8 % (ref 11.7–15.4)
HCT VFR BLD AUTO: 42.8 % (ref 34–46.6)
HGB BLD-MCNC: 13.9 G/DL (ref 11.1–15.9)
IMP & REVIEW OF LAB RESULTS: NORMAL
MCH RBC QN AUTO: 27.3 PG (ref 26.6–33)
MCHC RBC AUTO-ENTMCNC: 32.5 G/DL (ref 31.5–35.7)
MCV RBC AUTO: 84 FL (ref 79–97)
PLATELET # BLD AUTO: 267 X10E3/UL (ref 150–450)
RBC # BLD AUTO: 5.1 X10E6/UL (ref 3.77–5.28)
TSH SERPL DL<=0.005 MIU/L-ACNC: 1.31 UIU/ML (ref 0.45–4.5)
WBC # BLD AUTO: 5.1 X10E3/UL (ref 3.4–10.8)

## 2021-10-27 ENCOUNTER — VIRTUAL VISIT (OUTPATIENT)
Dept: FAMILY MEDICINE CLINIC | Age: 61
End: 2021-10-27
Payer: COMMERCIAL

## 2021-10-27 DIAGNOSIS — R00.0 TACHYCARDIA: Primary | ICD-10-CM

## 2021-10-27 PROCEDURE — 99213 OFFICE O/P EST LOW 20 MIN: CPT | Performed by: STUDENT IN AN ORGANIZED HEALTH CARE EDUCATION/TRAINING PROGRAM

## 2021-10-27 NOTE — PROGRESS NOTES
Caesar Guy  64 y.o. female  1960  [facility] [address]  [id number]   [address]:    Telemedicine Progress Note  Lidya Cardozo DO       Encounter Date and Time: 2021 at 10:51 AM    Consent: Caesar Guy, who was seen by synchronous (real-time) audio-video technology, and/or her healthcare decision maker, is aware that this patient-initiated, Telehealth encounter on 10/27/2021 is a billable service, with coverage as determined by her insurance carrier. She is aware that she may receive a bill and has provided verbal consent to proceed: Yes. Chief Complaint   Patient presents with    Irregular Heart Beat     caffeine induced tachycardia     History of Present Illness   Caesar Guy was evaluated by synchronous (real-time) audio-video technology from home, through a secure patient portal.    Caesar Guy is a 64 y.o. female presents today for follow up of tachycardia. Last two office visits with pulse of 106 and 107. Admittedly drinking 8-10 cups of coffee a day. Since last visit, patient has decreased this to 6 cups caffeinated coffee and 2-3 cups of decaf. At home pulse and BP is the followin/81, 88   127/85, 93  117/74, 93  95/72, 95  116/76, 96   109/74, 93  112/69, 98  112/79, 88  118/79, 85  128/81, 88  124/79, 84    Pulse overall improved. Last ECG on 3/2019 with sinus tachycardia. TSH and CBC normal as of 10/12. CMP ok on 2021. Review of Systems   Review of Systems   Respiratory: Negative for shortness of breath. Cardiovascular: Negative for chest pain and palpitations. Neurological: Negative for dizziness and headaches.        Vitals/Objective:     General: alert, cooperative, no distress   Mental  status: mental status: alert, oriented to person, place, and time, normal mood, behavior, speech, dress, motor activity, and thought processes   Resp: resp: normal effort and no respiratory distress   Neuro: neuro: no gross deficits   Skin: skin: no discoloration or lesions of concern on visible areas   Due to this being a TeleHealth evaluation, many elements of the physical examination are unable to be assessed. Assessment and Plan:   Time-based coding, delete if not needed: I spent at least 15 minutes with this established patient, and >50% of the time was spent counseling and/or coordinating care regarding tachycardia    Diagnoses and all orders for this visit:    1. Tachycardia: Has improved with decreased caffeine intake. Last EKG 3/2019 with sinus tachycardia. - Patient agreeable to decreasing caffeine intake a little further given pulse is continuing to range up into upper 90s. - Plan to repeat EKG at next visit   - Will recheck log at follow up visit in 2 weeks       We discussed the expected course, resolution and complications of the diagnosis(es) in detail. Medication risks, benefits, costs, interactions, and alternatives were discussed as indicated. I advised her to contact the office if her condition worsens, changes or fails to improve as anticipated. She expressed understanding with the diagnosis(es) and plan. Patient understands that this encounter was a temporary measure, and the importance of further follow up and examination was emphasized. Patient verbalized understanding. Electronically Signed: Amira Dowell DO    CPT Codes 87238-71649 for Established Patients may apply to this Telehealth Visit. POS code: 18. Modifier GT    Kailyn Sam is a 64 y.o. female who was evaluated by an audio-video encounter for concerns as above. Patient identification was verified prior to start of the visit. A caregiver was present when appropriate. Due to this being a TeleHealth encounter (During [place] [address] public health emergency), evaluation of the following organ systems was limited: Vitals/Constitutional/EENT/Resp/CV/GI//MS/Neuro/Skin/Heme-Lymph-Imm.   Pursuant to the emergency declaration under the [address], [address] [place] [address] authority and the [employer] Act, this Virtual Visit was conducted, with patient's (and/or legal guardian's) consent, to reduce the patient's risk of exposure to COVID-19 and provide necessary medical care. Services were provided through a synchronous discussion virtually to substitute for in-person clinic visit. I was at home. The patient was at home. History   Patients past medical, surgical and family histories were reviewed and updated.       Past Medical History:   Diagnosis Date    Diabetes ([address]     Neurological disorder     migraines    Sarcoidosis     Stickler's syndrome     daughter has-pt may have (genetic connective tissue disorder)    Strabismus      Past Surgical History:   Procedure Laterality Date    HX DILATION AND CURETTAGE      HX ORTHOPAEDIC Right     wrist    HX TONSILLECTOMY       Family History   Problem Relation Age of Onset    Breast Cancer Sister    [facility] Arthritis-rheumatoid Mother     Other Brother         Sarcoidosis    Cancer Father         colon/rectal    Heart Attack Father      Social History     Tobacco Use    Smoking status: Former Smoker    Smokeless tobacco: Never Used   Substance Use Topics    Alcohol use: No    Drug use: No     Patient Active Problem List   Diagnosis Code    Distal radius fracture S52.509A    Transaminitis R74.01    Acute hepatitis B17.9    Hepatic steatosis K76.0    Stenosing tenosynovitis of finger of right hand M65.841    History of sarcoidosis Z86.2    Carpal tunnel syndrome of left wrist G56.02    Seronegative rheumatoid arthritis of multiple sites ([address] M06.09    Vitamin D deficiency E55.9    Long-term use of immunosuppressant medication Z79.899    Diabetes mellitus ([address] E11.9    Sarcoidosis D86.9    Tachycardia R00.0          Current Medications/Allergies   Medications and Allergies reviewed:    Current Outpatient Medications   Medication Sig Dispense Refill    metFORMIN ER (GLUCOPHAGE XR) 500 mg tablet TAKE ONE TABLET BY MOUTH ONE TIME DAILY WITH FOOD  60 Tablet 0    loratadine (Claritin) 10 mg tablet Take 10 mg by mouth.  pravastatin (PRAVACHOL) 10 mg tablet Take 1 Tablet by mouth nightly. 30 Tablet 1    ergocalciferol (Vitamin D2) 1,250 mcg (50,000 unit) capsule Take 1 Cap by mouth every seven (7) days. 12 Cap 5    glucose blood VI test strips (TRUE METRIX GLUCOSE TEST STRIP) strip True Metrix Glucose Test Strip      lancets (TRUEPLUS LANCETS) 33 gauge misc TRUEplus Lancets 33 gauge      mometasone (NASONEX) 50 mcg/actuation nasal spray 2 Sprays by Nasal route. (Patient not taking: Reported on 9/27/2021)      Cetirizine (ZYRTEC) 10 mg cap Take  by mouth daily. (Patient not taking: Reported on 9/27/2021)      omeprazole (PRILOSEC) 20 mg capsule Take 20 mg by mouth daily.        Allergies   Allergen Reactions    Latex Itching     inflammation    Augmentin [Amoxicillin-Pot Clavulanate] Hives    Keflex [Cephalexin] Other (comments)     Elevated LFT's

## 2021-10-28 NOTE — PROGRESS NOTES
[address] Medicine Residency Attending Addendum:  Dr. Emmett Tobias, DO,  the patient and I were not physically present during this encounter. The resident and I are concurrently monitoring the patient care through appropriate telecommunication technology. I discussed the findings, assessment and plan with the resident and agree with the resident's findings and plan as documented in the resident's note.       Radhika Galvan MD

## 2021-11-10 DIAGNOSIS — E11.8 CONTROLLED TYPE 2 DIABETES MELLITUS WITH COMPLICATION, WITHOUT LONG-TERM CURRENT USE OF INSULIN (HCC): ICD-10-CM

## 2021-11-10 RX ORDER — PRAVASTATIN SODIUM 10 MG/1
TABLET ORAL
Qty: 30 TABLET | Refills: 0 | Status: SHIPPED | OUTPATIENT
Start: 2021-11-10 | End: 2021-12-23

## 2021-11-19 ENCOUNTER — OFFICE VISIT (OUTPATIENT)
Dept: FAMILY MEDICINE CLINIC | Age: 61
End: 2021-11-19
Payer: COMMERCIAL

## 2021-11-19 VITALS
WEIGHT: 146 LBS | OXYGEN SATURATION: 97 % | BODY MASS INDEX: 25.87 KG/M2 | DIASTOLIC BLOOD PRESSURE: 79 MMHG | HEIGHT: 63 IN | TEMPERATURE: 98 F | HEART RATE: 99 BPM | SYSTOLIC BLOOD PRESSURE: 119 MMHG

## 2021-11-19 DIAGNOSIS — R00.0 TACHYCARDIA: Primary | ICD-10-CM

## 2021-11-19 PROCEDURE — 99213 OFFICE O/P EST LOW 20 MIN: CPT | Performed by: STUDENT IN AN ORGANIZED HEALTH CARE EDUCATION/TRAINING PROGRAM

## 2021-11-19 PROCEDURE — 93000 ELECTROCARDIOGRAM COMPLETE: CPT | Performed by: STUDENT IN AN ORGANIZED HEALTH CARE EDUCATION/TRAINING PROGRAM

## 2021-11-19 NOTE — PROGRESS NOTES
Chief Complaint   Patient presents with    Rapid Heart Rate     follow up      Visit Vitals  /79 (BP 1 Location: Left upper arm, BP Patient Position: Sitting)   Pulse 99   Temp 98 °F (36.7 °C) (Oral)   Ht 5' 3\" (1.6 m)   Wt 146 lb (66.2 kg)   SpO2 97%   BMI 25.86 kg/m²

## 2021-11-19 NOTE — PROGRESS NOTES
Mario Horner is a 64 y.o. female who presents for follow up of caffeine induced tachycardia. She has been working to limit caffeine intake and has gone from 10 cups of coffee a day down to 2-4 caffienated cups and 2-4 decaf cups. Home HR controlled at last visit on 10/27 and remains wnl: 80-90 bpm.  On review of log, BP also controlled in the 100-100s/70-80s. States her HR has always been high since college. Last EKG 3/2019 w/ sinus tach, planning to repeat today now that patient is seen in office. Review of Systems  Review of Systems   Respiratory: Negative for chest tightness and shortness of breath. Cardiovascular: Negative for chest pain, palpitations and leg swelling. Neurological: Negative for headaches. Past Medical History  Past Medical History:   Diagnosis Date    Diabetes ([facility]     Neurological disorder     migraines    Sarcoidosis     Stickler's syndrome     daughter has-pt may have (genetic connective tissue disorder)    Strabismus        Current Medications  Current Outpatient Medications   Medication Sig Dispense Refill    pravastatin (PRAVACHOL) 10 mg tablet TAKE ONE TABLET BY MOUTH EVERY EVENING 30 Tablet 0    metFORMIN ER (GLUCOPHAGE XR) 500 mg tablet TAKE ONE TABLET BY MOUTH ONE TIME DAILY WITH FOOD  60 Tablet 0    loratadine (Claritin) 10 mg tablet Take 10 mg by mouth.  ergocalciferol (Vitamin D2) 1,250 mcg (50,000 unit) capsule Take 1 Cap by mouth every seven (7) days. 12 Cap 5    glucose blood VI test strips (TRUE METRIX GLUCOSE TEST STRIP) strip True Metrix Glucose Test Strip      lancets (TRUEPLUS LANCETS) 33 gauge misc TRUEplus Lancets 33 gauge      mometasone (NASONEX) 50 mcg/actuation nasal spray 2 Sprays by Nasal route. (Patient not taking: Reported on 9/27/2021)      Cetirizine (ZYRTEC) 10 mg cap Take  by mouth daily. (Patient not taking: Reported on 9/27/2021)      omeprazole (PRILOSEC) 20 mg capsule Take 20 mg by mouth daily. Allergies  Allergies   Allergen Reactions    Latex Itching     inflammation    Augmentin [Amoxicillin-Pot Clavulanate] Hives    Keflex [Cephalexin] Other (comments)     Elevated LFT's       Social History   Social History     Socioeconomic History    Marital status:      Spouse name: Not on file    Number of children: Not on file    Years of education: Not on file    Highest education level: Not on file   Occupational History    Not on file   Tobacco Use    Smoking status: Former Smoker    Smokeless tobacco: Never Used   Substance and Sexual Activity    Alcohol use: No    Drug use: No    Sexual activity: Yes     Partners: Male   Other Topics Concern    Not on file   Social History Narrative    Not on file     Social Determinants of Health     Financial Resource Strain:     Difficulty of Paying Living Expenses: Not on file   Food Insecurity:     Worried About Running Out of Food in the Last Year: Not on file    Cipriano of Food in the Last Year: Not on file   Transportation Needs:     Lack of Transportation (Medical): Not on file    Lack of Transportation (Non-Medical):  Not on file   Physical Activity:     Days of Exercise per Week: Not on file    Minutes of Exercise per Session: Not on file   Stress:     Feeling of Stress : Not on file   Social Connections:     Frequency of Communication with Friends and Family: Not on file    Frequency of Social Gatherings with Friends and Family: Not on file    Attends Catholic Services: Not on file    Active Member of Clubs or Organizations: Not on file    Attends Club or Organization Meetings: Not on file    Marital Status: Not on file   Intimate Partner Violence:     Fear of Current or Ex-Partner: Not on file    Emotionally Abused: Not on file    Physically Abused: Not on file    Sexually Abused: Not on file   Housing Stability:     Unable to Pay for Housing in the Last Year: Not on file    Number of [place] in the Last Year: Not on file    Unstable Housing in the Last Year: Not on file       Family History  Family History   Problem Relation Age of Onset    Breast Cancer Sister    [facility] Arthritis-rheumatoid Mother     Other Brother         Sarcoidosis    Cancer Father         colon/rectal    Heart Attack Father        Objective   Vital Signs  There were no vitals taken for this visit. Physical Examination  Physical Exam  Cardiovascular:      Rate and Rhythm: Normal rate and regular rhythm. Pulses: Normal pulses. Heart sounds: Normal heart sounds. Pulmonary:      Effort: Pulmonary effort is normal.      Breath sounds: Normal breath sounds. Musculoskeletal:      Right lower leg: No edema. Left lower leg: No edema. Assessment   Yair Huff is a 64 y.o. who is here for tachycardia, much improved with caffeine reduction. Plan   Diagnoses and all orders for this visit:    1. Tachycardia: Likely secondary to excessive caffeine intake as pulse has improved with reduction. Home pulse 80-90s, in office slightly higher however patient admittedly anxious. EKG reviewed as NSR at 98 bpm (confirmed by Dr. Traci Gayle). - continue caffeine limitation  - Continue home pulse and BP monitoring. Send [employer] message if pulse consistently > 100pbm     Follow up for annual visit (due 11/2021) or sooner as needed    Patient is counseled to return to the office if symptoms do not improve as expected. Urgent consultation with the nearest Emergency Department is strongly recommended if condition worsens. Patient is counseled to follow up as recommended and to inform the office if any changes in treatment are recommended.       I discussed this patient with Dr. Zach Gayle (Attending Physician)       Signed By:  Blas Rg DO    Family Medicine Resident

## 2021-12-22 DIAGNOSIS — E11.8 CONTROLLED TYPE 2 DIABETES MELLITUS WITH COMPLICATION, WITHOUT LONG-TERM CURRENT USE OF INSULIN (HCC): ICD-10-CM

## 2021-12-22 RX ORDER — METFORMIN HYDROCHLORIDE 500 MG/1
TABLET, EXTENDED RELEASE ORAL
Qty: 60 TABLET | Refills: 0 | Status: SHIPPED | OUTPATIENT
Start: 2021-12-22 | End: 2022-01-10

## 2021-12-23 RX ORDER — PRAVASTATIN SODIUM 10 MG/1
TABLET ORAL
Qty: 30 TABLET | Refills: 0 | Status: SHIPPED | OUTPATIENT
Start: 2021-12-23 | End: 2022-01-14

## 2022-01-01 RX ORDER — METFORMIN HYDROCHLORIDE 500 MG/1
TABLET, EXTENDED RELEASE ORAL
Qty: 60 TABLET | Refills: 0 | Status: CANCELLED | OUTPATIENT
Start: 2022-01-01

## 2022-01-10 RX ORDER — METFORMIN HYDROCHLORIDE 500 MG/1
TABLET, EXTENDED RELEASE ORAL
Qty: 60 TABLET | Refills: 0 | Status: SHIPPED | OUTPATIENT
Start: 2022-01-10 | End: 2022-01-19

## 2022-01-12 DIAGNOSIS — E11.8 CONTROLLED TYPE 2 DIABETES MELLITUS WITH COMPLICATION, WITHOUT LONG-TERM CURRENT USE OF INSULIN (HCC): ICD-10-CM

## 2022-01-13 ENCOUNTER — TELEPHONE (OUTPATIENT)
Dept: FAMILY MEDICINE CLINIC | Age: 62
End: 2022-01-13

## 2022-01-13 DIAGNOSIS — E11.8 CONTROLLED TYPE 2 DIABETES MELLITUS WITH COMPLICATION, WITHOUT LONG-TERM CURRENT USE OF INSULIN (HCC): Primary | ICD-10-CM

## 2022-01-13 NOTE — TELEPHONE ENCOUNTER
Called patient to schedule lab appt  And she would her A1C cheked with the Lipids. Please place order. Thanks.

## 2022-01-14 RX ORDER — PRAVASTATIN SODIUM 10 MG/1
TABLET ORAL
Qty: 30 TABLET | Refills: 0 | Status: SHIPPED | OUTPATIENT
Start: 2022-01-14 | End: 2022-02-12

## 2022-01-19 RX ORDER — METFORMIN HYDROCHLORIDE 500 MG/1
TABLET, EXTENDED RELEASE ORAL
Qty: 60 TABLET | Refills: 0 | Status: SHIPPED | OUTPATIENT
Start: 2022-01-19 | End: 2022-03-09

## 2022-01-26 ENCOUNTER — LAB ONLY (OUTPATIENT)
Dept: FAMILY MEDICINE CLINIC | Age: 62
End: 2022-01-26

## 2022-01-26 DIAGNOSIS — E78.00 PURE HYPERCHOLESTEROLEMIA: ICD-10-CM

## 2022-01-26 DIAGNOSIS — E11.8 CONTROLLED TYPE 2 DIABETES MELLITUS WITH COMPLICATION, WITHOUT LONG-TERM CURRENT USE OF INSULIN (HCC): ICD-10-CM

## 2022-01-27 LAB
CHOLEST SERPL-MCNC: 239 MG/DL (ref 100–199)
EST. AVERAGE GLUCOSE BLD GHB EST-MCNC: 114 MG/DL
HBA1C MFR BLD: 5.6 % (ref 4.8–5.6)
HDLC SERPL-MCNC: 55 MG/DL
IMP & REVIEW OF LAB RESULTS: NORMAL
LDLC SERPL CALC-MCNC: 152 MG/DL (ref 0–99)
TRIGL SERPL-MCNC: 176 MG/DL (ref 0–149)
VLDLC SERPL CALC-MCNC: 32 MG/DL (ref 5–40)

## 2022-03-18 PROBLEM — K76.0 HEPATIC STEATOSIS: Status: ACTIVE | Noted: 2019-01-10

## 2022-03-18 PROBLEM — R74.01 TRANSAMINITIS: Status: ACTIVE | Noted: 2018-09-16

## 2022-03-19 PROBLEM — Z86.2 HISTORY OF SARCOIDOSIS: Status: ACTIVE | Noted: 2019-01-10

## 2022-03-19 PROBLEM — B17.9 ACUTE HEPATITIS: Status: ACTIVE | Noted: 2018-09-16

## 2022-03-19 PROBLEM — Z79.60 LONG-TERM USE OF IMMUNOSUPPRESSANT MEDICATION: Status: ACTIVE | Noted: 2019-03-15

## 2022-03-19 PROBLEM — E11.9 DIABETES MELLITUS (HCC): Status: ACTIVE | Noted: 2018-09-13

## 2022-03-19 PROBLEM — D86.9 SARCOIDOSIS: Status: ACTIVE | Noted: 2018-09-13

## 2022-03-19 PROBLEM — M06.09 SERONEGATIVE RHEUMATOID ARTHRITIS OF MULTIPLE SITES (HCC): Status: ACTIVE | Noted: 2019-02-08

## 2022-03-19 PROBLEM — G56.02 CARPAL TUNNEL SYNDROME OF LEFT WRIST: Status: ACTIVE | Noted: 2019-01-10

## 2022-03-20 PROBLEM — M65.841 STENOSING TENOSYNOVITIS OF FINGER OF RIGHT HAND: Status: ACTIVE | Noted: 2019-01-10

## 2022-03-20 PROBLEM — E55.9 VITAMIN D DEFICIENCY: Status: ACTIVE | Noted: 2019-02-08

## 2022-03-20 PROBLEM — R00.0 TACHYCARDIA: Status: ACTIVE | Noted: 2021-10-27

## 2022-06-04 DIAGNOSIS — E11.8 CONTROLLED TYPE 2 DIABETES MELLITUS WITH COMPLICATION, WITHOUT LONG-TERM CURRENT USE OF INSULIN (HCC): ICD-10-CM

## 2022-06-04 RX ORDER — METFORMIN HYDROCHLORIDE 500 MG/1
TABLET, EXTENDED RELEASE ORAL
Qty: 90 TABLET | Refills: 0 | Status: SHIPPED | OUTPATIENT
Start: 2022-06-04 | End: 2022-10-08

## 2022-06-04 RX ORDER — PRAVASTATIN SODIUM 10 MG/1
TABLET ORAL
Qty: 30 TABLET | Refills: 0 | Status: SHIPPED | OUTPATIENT
Start: 2022-06-04 | End: 2022-07-05

## 2022-10-08 RX ORDER — METFORMIN HYDROCHLORIDE 500 MG/1
TABLET, EXTENDED RELEASE ORAL
Qty: 90 TABLET | Refills: 0 | Status: SHIPPED | OUTPATIENT
Start: 2022-10-08

## 2023-01-30 DIAGNOSIS — E11.8 CONTROLLED TYPE 2 DIABETES MELLITUS WITH COMPLICATION, WITHOUT LONG-TERM CURRENT USE OF INSULIN (HCC): ICD-10-CM

## 2023-02-02 RX ORDER — PRAVASTATIN SODIUM 10 MG/1
TABLET ORAL
Qty: 90 TABLET | Refills: 0 | Status: SHIPPED | OUTPATIENT
Start: 2023-02-02

## 2023-02-10 ENCOUNTER — OFFICE VISIT (OUTPATIENT)
Dept: FAMILY MEDICINE CLINIC | Age: 63
End: 2023-02-10
Payer: COMMERCIAL

## 2023-02-10 VITALS
WEIGHT: 155.4 LBS | OXYGEN SATURATION: 98 % | SYSTOLIC BLOOD PRESSURE: 126 MMHG | BODY MASS INDEX: 27.54 KG/M2 | HEART RATE: 101 BPM | RESPIRATION RATE: 14 BRPM | TEMPERATURE: 97.9 F | DIASTOLIC BLOOD PRESSURE: 80 MMHG | HEIGHT: 63 IN

## 2023-02-10 DIAGNOSIS — Z12.11 SCREENING FOR COLON CANCER: ICD-10-CM

## 2023-02-10 DIAGNOSIS — Z87.81 PERSONAL HISTORY OF RECURRENT VERTEBRAL FRACTURES: ICD-10-CM

## 2023-02-10 DIAGNOSIS — E55.9 VITAMIN D DEFICIENCY: ICD-10-CM

## 2023-02-10 DIAGNOSIS — E11.9 TYPE 2 DIABETES MELLITUS WITHOUT COMPLICATION, WITHOUT LONG-TERM CURRENT USE OF INSULIN (HCC): Primary | ICD-10-CM

## 2023-02-10 DIAGNOSIS — Z12.31 ENCOUNTER FOR SCREENING MAMMOGRAM FOR MALIGNANT NEOPLASM OF BREAST: ICD-10-CM

## 2023-02-10 DIAGNOSIS — M06.09 SERONEGATIVE RHEUMATOID ARTHRITIS OF MULTIPLE SITES (HCC): ICD-10-CM

## 2023-02-10 DIAGNOSIS — Z82.62 FAMILY HISTORY OF OSTEOPOROSIS: ICD-10-CM

## 2023-02-10 DIAGNOSIS — E78.00 PURE HYPERCHOLESTEROLEMIA: ICD-10-CM

## 2023-02-10 NOTE — PROGRESS NOTES
Subjective   Sharon Cuenca is a 58 y.o. female who presents for medication refill. Diabetes  - Metformin 500mg XR  - last a1c 5.6 in 1/2022  - urine protein OK in 9/2021  - sugars having been running higher per patient - feels like shes average in 110s  - has gained 9lbs since last visit  - not exercising as much but diet is low carb. HLD  - Pravastatin 10mg daily  - last  in 10/2021    GERD  -omeprazole    Rheumatoid arthritis  - not on any medicine, was on steroid shots previously    FH osteoporosis/personal history of vertebral fractures and radial fractures      Preventative   - mammogram - last done in 2019  - colonoscopy - never had one   - shingles - per patient she has had two    Past Medical History  Past Medical History:   Diagnosis Date    Diabetes ([facility]     Neurological disorder     migraines    Sarcoidosis     Stickler's syndrome     daughter has-pt may have (genetic connective tissue disorder)    Strabismus        Current Medications  Current Outpatient Medications   Medication Sig Dispense Refill    pravastatin (PRAVACHOL) 10 mg tablet TAKE ONE TABLET BY MOUTH EVERY EVENING 90 Tablet 0    metFORMIN ER (GLUCOPHAGE XR) 500 mg tablet TAKE ONE TABLET BY MOUTH ONE TIME DAILY WITH FOOD 120 Tablet 0    loratadine (CLARITIN) 10 mg tablet Take 10 mg by mouth. glucose blood VI test strips strip True Metrix Glucose Test Strip      lancets 33 gauge misc TRUEplus Lancets 33 gauge      omeprazole (PRILOSEC) 20 mg capsule Take 20 mg by mouth daily. ergocalciferol (Vitamin D2) 1,250 mcg (50,000 unit) capsule Take 1 Cap by mouth every seven (7) days.  12 Cap 5         Objective   Vital Signs  Visit Vitals  /80 (BP 1 Location: Right arm, BP Patient Position: Sitting, BP Cuff Size: Adult)   Pulse (!) 101   Temp 97.9 °F (36.6 °C)   Resp 14   Ht 5' 3\" (1.6 m)   Wt 155 lb 6.4 oz (70.5 kg)   SpO2 98%   BMI 27.53 kg/m²       Physical Examination  Physical Exam  Constitutional: Appearance: Normal appearance. She is normal weight. Cardiovascular:      Rate and Rhythm: Regular rhythm. Tachycardia present. Pulses: Normal pulses. Heart sounds: Normal heart sounds. Pulmonary:      Effort: Pulmonary effort is normal.      Breath sounds: Normal breath sounds. Abdominal:      General: Abdomen is flat. Bowel sounds are normal.      Palpations: Abdomen is soft. Neurological:      Mental Status: She is alert. FOOT EXAM: Diabetic foot exam:    Left: Vibratory sensation normal    Proprioception normal    Filament test normal sensation with micro filament    Pulse DP: 2+ (normal)    Pulse PT: 2+ (normal)    Deformities: None      Right: Vibratory sensation normal    Proprioception normal    Filament test normal sensation with micro filament    Pulse DP: 2+ (normal)    Pulse PT: 2+ (normal)    Deformities: None      Assessment   Wayne Rosa is a 58 y.o. who is here for follow up of chronic conditions which appear to be doing stable, however with the higher sugars, diabetes may need more control, will check with A1c. Sugars still at goal of less than 130 fasting and 180 preprandial.    Plan   Diagnoses and all orders for this visit:    1. Type 2 diabetes mellitus without complication, without long-term current use of insulin [facility]): patient interested in increasing metformin, discussed waiting for lab results first.  -     METABOLIC PANEL, COMPREHENSIVE; Future  -     HEMOGLOBIN A1C WITH EAG; Future  -      DIABETES FOOT EXAM  -     MICROALBUMIN, UR, RAND W/ MICROALB/CREAT RATIO; Future  -     CBC WITH AUTOMATED DIFF; Future  - metformin ER 500mg, will adjust pending A1c    2. Pure hypercholesterolemia: intolerant to statins previously, OK with low dose pravastatin however lipids not at goal on last check  -     LIPID PANEL; Future  - pravastatin 10mg     3. Seronegative rheumatoid arthritis of multiple sites [facility]): stable, no medications at this time    4.  Vitamin D deficiency  -     VITAMIN D, 25 HYDROXY; Future    5. Personal history of recurrent vertebral fractures: patient interested in early DEXA scan given that her sisters are on medication of osteoporosis. I think this is reasonable given patients vertebral fractures (traumatic) and radius fracture (related to fall). -     DEXA BONE DENSITY STUDY AXIAL; Future    6. Family history of osteoporosis  -     DEXA BONE DENSITY STUDY AXIAL; Future    7. Screening for colon cancer: never had. FH of colon cancer.  -     REFERRAL TO GASTROENTEROLOGY  - strongly encouraged colonoscopy today    8.  Encounter for screening mammogram for malignant neoplasm of breast       Follow up in 6-12 months based on lab work      I discussed this patient with Dr. Guy Nichols (Attending Physician)       Signed By:  Devang Jung DO    Family Medicine Resident

## 2023-02-11 LAB
25(OH)D3+25(OH)D2 SERPL-MCNC: 12.6 NG/ML (ref 30–100)
ALBUMIN SERPL-MCNC: 5.1 G/DL (ref 3.8–4.8)
ALBUMIN/CREAT UR: 26 MG/G CREAT (ref 0–29)
ALBUMIN/GLOB SERPL: 2.4 {RATIO} (ref 1.2–2.2)
ALP SERPL-CCNC: 102 IU/L (ref 44–121)
ALT SERPL-CCNC: 12 IU/L (ref 0–32)
AST SERPL-CCNC: 12 IU/L (ref 0–40)
BASOPHILS # BLD AUTO: 0.1 X10E3/UL (ref 0–0.2)
BASOPHILS NFR BLD AUTO: 1 %
BILIRUB SERPL-MCNC: 0.3 MG/DL (ref 0–1.2)
BUN SERPL-MCNC: 16 MG/DL (ref 8–27)
BUN/CREAT SERPL: 29 (ref 12–28)
CALCIUM SERPL-MCNC: 9.5 MG/DL (ref 8.7–10.3)
CHLORIDE SERPL-SCNC: 103 MMOL/L (ref 96–106)
CHOLEST SERPL-MCNC: 242 MG/DL (ref 100–199)
CO2 SERPL-SCNC: 21 MMOL/L (ref 20–29)
CREAT SERPL-MCNC: 0.56 MG/DL (ref 0.57–1)
CREAT UR-MCNC: 60.8 MG/DL
EGFRCR SERPLBLD CKD-EPI 2021: 103 ML/MIN/1.73
EOSINOPHIL # BLD AUTO: 0.1 X10E3/UL (ref 0–0.4)
EOSINOPHIL NFR BLD AUTO: 2 %
ERYTHROCYTE [DISTWIDTH] IN BLOOD BY AUTOMATED COUNT: 13.1 % (ref 11.7–15.4)
EST. AVERAGE GLUCOSE BLD GHB EST-MCNC: 114 MG/DL
GLOBULIN SER CALC-MCNC: 2.1 G/DL (ref 1.5–4.5)
GLUCOSE SERPL-MCNC: 101 MG/DL (ref 70–99)
HBA1C MFR BLD: 5.6 % (ref 4.8–5.6)
HCT VFR BLD AUTO: 43.3 % (ref 34–46.6)
HDLC SERPL-MCNC: 51 MG/DL
HGB BLD-MCNC: 14 G/DL (ref 11.1–15.9)
IMM GRANULOCYTES # BLD AUTO: 0 X10E3/UL (ref 0–0.1)
IMM GRANULOCYTES NFR BLD AUTO: 0 %
IMP & REVIEW OF LAB RESULTS: NORMAL
LDLC SERPL CALC-MCNC: 150 MG/DL (ref 0–99)
LYMPHOCYTES # BLD AUTO: 2.1 X10E3/UL (ref 0.7–3.1)
LYMPHOCYTES NFR BLD AUTO: 33 %
MCH RBC QN AUTO: 27.6 PG (ref 26.6–33)
MCHC RBC AUTO-ENTMCNC: 32.3 G/DL (ref 31.5–35.7)
MCV RBC AUTO: 85 FL (ref 79–97)
MICROALBUMIN UR-MCNC: 15.7 UG/ML
MONOCYTES # BLD AUTO: 0.4 X10E3/UL (ref 0.1–0.9)
MONOCYTES NFR BLD AUTO: 6 %
NEUTROPHILS # BLD AUTO: 3.7 X10E3/UL (ref 1.4–7)
NEUTROPHILS NFR BLD AUTO: 58 %
PLATELET # BLD AUTO: 280 X10E3/UL (ref 150–450)
POTASSIUM SERPL-SCNC: 4.5 MMOL/L (ref 3.5–5.2)
PROT SERPL-MCNC: 7.2 G/DL (ref 6–8.5)
RBC # BLD AUTO: 5.08 X10E6/UL (ref 3.77–5.28)
SODIUM SERPL-SCNC: 146 MMOL/L (ref 134–144)
TRIGL SERPL-MCNC: 228 MG/DL (ref 0–149)
VLDLC SERPL CALC-MCNC: 41 MG/DL (ref 5–40)
WBC # BLD AUTO: 6.4 X10E3/UL (ref 3.4–10.8)

## 2023-02-13 ENCOUNTER — PATIENT MESSAGE (OUTPATIENT)
Dept: FAMILY MEDICINE CLINIC | Age: 63
End: 2023-02-13

## 2023-02-13 DIAGNOSIS — E55.9 VITAMIN D DEFICIENCY: Primary | ICD-10-CM

## 2023-02-13 DIAGNOSIS — E78.00 PURE HYPERCHOLESTEROLEMIA: ICD-10-CM

## 2023-02-13 RX ORDER — MELATONIN
1000 DAILY
Qty: 90 TABLET | Refills: 1 | Status: SHIPPED | OUTPATIENT
Start: 2023-02-13

## 2023-02-13 RX ORDER — PRAVASTATIN SODIUM 20 MG/1
20 TABLET ORAL
Qty: 90 TABLET | Refills: 1 | Status: SHIPPED | OUTPATIENT
Start: 2023-02-13

## 2023-02-14 DIAGNOSIS — E87.0 HYPERNATREMIA: Primary | ICD-10-CM

## 2023-02-15 NOTE — TELEPHONE ENCOUNTER
From: Cora Altamirano DO  To: Deanne De La Rosa  Sent: 2/13/2023 8:31 AM EST  Subject: Labs    His Ms. Chavez! I have reviewed your labs. Your A1c continues to look excellent at 5.6, unchanged from last year. I do not think we need to increase your metformin. Your cholesterol levels remain high however, I would like to increase your pravastatin to 20mg. I know you have had some statin intolerance in the past, this would still be a mild dose however. Have you tried this dosage before? Additionally, your vitamin D is very low. Given your history of fractures, I would certainly recommend supplementation and will send this in. We can plan to recheck your cholesterol levels and vitamin D in 3 months if this all sounds good. Please let me know!   Dr. Earline Ramirez

## 2023-02-28 LAB
ALBUMIN SERPL-MCNC: 4.8 G/DL (ref 3.8–4.8)
ALBUMIN/GLOB SERPL: 1.8 {RATIO} (ref 1.2–2.2)
ALP SERPL-CCNC: 89 IU/L (ref 44–121)
ALT SERPL-CCNC: 12 IU/L (ref 0–32)
AST SERPL-CCNC: 14 IU/L (ref 0–40)
BILIRUB SERPL-MCNC: 0.4 MG/DL (ref 0–1.2)
BUN SERPL-MCNC: 9 MG/DL (ref 8–27)
BUN/CREAT SERPL: 13 (ref 12–28)
CALCIUM SERPL-MCNC: 9.7 MG/DL (ref 8.7–10.3)
CHLORIDE SERPL-SCNC: 100 MMOL/L (ref 96–106)
CO2 SERPL-SCNC: 23 MMOL/L (ref 20–29)
CREAT SERPL-MCNC: 0.68 MG/DL (ref 0.57–1)
EGFRCR SERPLBLD CKD-EPI 2021: 98 ML/MIN/1.73
GLOBULIN SER CALC-MCNC: 2.6 G/DL (ref 1.5–4.5)
GLUCOSE SERPL-MCNC: 88 MG/DL (ref 70–99)
POTASSIUM SERPL-SCNC: 4.3 MMOL/L (ref 3.5–5.2)
PROT SERPL-MCNC: 7.4 G/DL (ref 6–8.5)
SODIUM SERPL-SCNC: 139 MMOL/L (ref 134–144)

## 2023-04-18 LAB
25(OH)D3+25(OH)D2 SERPL-MCNC: 22.4 NG/ML (ref 30–100)
CHOLEST SERPL-MCNC: 231 MG/DL (ref 100–199)
HDLC SERPL-MCNC: 49 MG/DL
IMP & REVIEW OF LAB RESULTS: NORMAL
LDLC SERPL CALC-MCNC: 143 MG/DL (ref 0–99)
TRIGL SERPL-MCNC: 214 MG/DL (ref 0–149)
VLDLC SERPL CALC-MCNC: 39 MG/DL (ref 5–40)

## 2023-04-20 ENCOUNTER — PATIENT MESSAGE (OUTPATIENT)
Dept: FAMILY MEDICINE CLINIC | Age: 63
End: 2023-04-20

## 2023-04-20 RX ORDER — ACETAMINOPHEN 500 MG
2000 TABLET ORAL 2 TIMES DAILY
Qty: 90 CAPSULE | Refills: 3 | Status: SHIPPED | OUTPATIENT
Start: 2023-04-20

## 2023-04-20 RX ORDER — PRAVASTATIN SODIUM 40 MG/1
40 TABLET ORAL
Qty: 90 TABLET | Refills: 1 | Status: SHIPPED | OUTPATIENT
Start: 2023-04-20

## 2023-04-22 DIAGNOSIS — E55.9 VITAMIN D DEFICIENCY: Primary | ICD-10-CM

## 2023-04-23 DIAGNOSIS — E78.00 PURE HYPERCHOLESTEROLEMIA: Primary | ICD-10-CM

## 2023-05-29 RX ORDER — PRAVASTATIN SODIUM 40 MG
1 TABLET ORAL NIGHTLY
COMMUNITY
Start: 2023-04-20

## 2023-05-29 RX ORDER — METFORMIN HYDROCHLORIDE 500 MG/1
500 TABLET, EXTENDED RELEASE ORAL
COMMUNITY
Start: 2023-04-10

## 2023-05-29 RX ORDER — LORATADINE 10 MG/1
10 TABLET ORAL
COMMUNITY

## 2023-05-29 RX ORDER — OMEPRAZOLE 20 MG/1
20 CAPSULE, DELAYED RELEASE ORAL DAILY
COMMUNITY

## 2023-10-30 ENCOUNTER — HOSPITAL ENCOUNTER (EMERGENCY)
Facility: HOSPITAL | Age: 63
Discharge: HOME | End: 2023-10-30
Attending: STUDENT IN AN ORGANIZED HEALTH CARE EDUCATION/TRAINING PROGRAM
Payer: COMMERCIAL

## 2023-10-30 VITALS
WEIGHT: 159.39 LBS | BODY MASS INDEX: 28.24 KG/M2 | OXYGEN SATURATION: 99 % | SYSTOLIC BLOOD PRESSURE: 181 MMHG | RESPIRATION RATE: 18 BRPM | HEIGHT: 63 IN | DIASTOLIC BLOOD PRESSURE: 100 MMHG | HEART RATE: 106 BPM | TEMPERATURE: 97.8 F

## 2023-10-30 DIAGNOSIS — H53.8 BLURRY VISION, RIGHT EYE: Primary | ICD-10-CM

## 2023-10-30 LAB
ALBUMIN SERPL-MCNC: 4.3 G/DL (ref 3.5–5)
ALBUMIN/GLOB SERPL: 1.1 (ref 1.1–2.2)
ALP SERPL-CCNC: 88 U/L (ref 45–117)
ALT SERPL-CCNC: 24 U/L (ref 12–78)
ANION GAP SERPL CALC-SCNC: 6 MMOL/L (ref 5–15)
AST SERPL-CCNC: 13 U/L (ref 15–37)
BASOPHILS # BLD: 0 K/UL (ref 0–0.1)
BASOPHILS NFR BLD: 1 % (ref 0–1)
BILIRUB SERPL-MCNC: 0.6 MG/DL (ref 0.2–1)
BUN SERPL-MCNC: 10 MG/DL (ref 6–20)
BUN/CREAT SERPL: 15 (ref 12–20)
CALCIUM SERPL-MCNC: 9.4 MG/DL (ref 8.5–10.1)
CHLORIDE SERPL-SCNC: 108 MMOL/L (ref 97–108)
CO2 SERPL-SCNC: 27 MMOL/L (ref 21–32)
COMMENT:: NORMAL
CREAT SERPL-MCNC: 0.67 MG/DL (ref 0.55–1.02)
DIFFERENTIAL METHOD BLD: NORMAL
EOSINOPHIL # BLD: 0.1 K/UL (ref 0–0.4)
EOSINOPHIL NFR BLD: 1 % (ref 0–7)
ERYTHROCYTE [DISTWIDTH] IN BLOOD BY AUTOMATED COUNT: 13.1 % (ref 11.5–14.5)
GLOBULIN SER CALC-MCNC: 3.9 G/DL (ref 2–4)
GLUCOSE SERPL-MCNC: 134 MG/DL (ref 65–100)
HCT VFR BLD AUTO: 42.2 % (ref 35–47)
HGB BLD-MCNC: 13.9 G/DL (ref 11.5–16)
IMM GRANULOCYTES # BLD AUTO: 0 K/UL (ref 0–0.04)
IMM GRANULOCYTES NFR BLD AUTO: 0 % (ref 0–0.5)
INR PPP: 1 (ref 0.9–1.1)
LYMPHOCYTES # BLD: 1.8 K/UL (ref 0.8–3.5)
LYMPHOCYTES NFR BLD: 29 % (ref 12–49)
MCH RBC QN AUTO: 27.5 PG (ref 26–34)
MCHC RBC AUTO-ENTMCNC: 32.9 G/DL (ref 30–36.5)
MCV RBC AUTO: 83.6 FL (ref 80–99)
MONOCYTES # BLD: 0.3 K/UL (ref 0–1)
MONOCYTES NFR BLD: 5 % (ref 5–13)
NEUTS SEG # BLD: 4 K/UL (ref 1.8–8)
NEUTS SEG NFR BLD: 64 % (ref 32–75)
NRBC # BLD: 0 K/UL (ref 0–0.01)
NRBC BLD-RTO: 0 PER 100 WBC
PLATELET # BLD AUTO: 242 K/UL (ref 150–400)
POTASSIUM SERPL-SCNC: 3.9 MMOL/L (ref 3.5–5.1)
PROT SERPL-MCNC: 8.2 G/DL (ref 6.4–8.2)
PROTHROMBIN TIME: 10.6 SEC (ref 9–11.1)
RBC # BLD AUTO: 5.05 M/UL (ref 3.8–5.2)
SODIUM SERPL-SCNC: 141 MMOL/L (ref 136–145)
SPECIMEN HOLD: NORMAL
WBC # BLD AUTO: 6.1 K/UL (ref 3.6–11)

## 2023-10-30 PROCEDURE — 99283 EMERGENCY DEPT VISIT LOW MDM: CPT

## 2023-10-30 PROCEDURE — 85610 PROTHROMBIN TIME: CPT

## 2023-10-30 PROCEDURE — 36415 COLL VENOUS BLD VENIPUNCTURE: CPT

## 2023-10-30 PROCEDURE — 80053 COMPREHEN METABOLIC PANEL: CPT

## 2023-10-30 PROCEDURE — 85025 COMPLETE CBC W/AUTO DIFF WBC: CPT

## 2023-10-30 ASSESSMENT — PAIN SCALES - GENERAL: PAINLEVEL_OUTOF10: 0

## 2023-10-30 ASSESSMENT — PAIN - FUNCTIONAL ASSESSMENT: PAIN_FUNCTIONAL_ASSESSMENT: NONE - DENIES PAIN

## 2023-10-30 NOTE — ED PROVIDER NOTES
[facility] EMERGENCY DEPT  EMERGENCY DEPARTMENT ENCOUNTER      Pt Name: Nadia Guzman  MRN: [id number]  [address] [facility] 1960  Date of evaluation: 10/30/2023  Provider: Ailyn Diaz MD    CHIEF COMPLAINT       Chief Complaint   Patient presents with    Blurred Vision           Numbness         HISTORY OF PRESENT ILLNESS   (Location/Symptom, Timing/Onset, Context/Setting, Quality, Duration, Modifying Factors, Severity)  Note limiting factors. 66-year-old female with history of migraines, sarcoid, strabismus, prior ophthalmic surgery who presents for blurred vision since this morning. She reports she woke up and noticed that she had blurry vision out of her right eye. She went to urgent care and was instructed to come here to the ER. She does wear corrective lenses. Does not wear contacts. No pain or discomfort. Denies headache, numbness, weakness, slurred speech. Does not take any blood thinners. No injuries or falls. She reports her left eye is normal.  She reports her right eye is blurry. She went to bed with normal vision, woke up with blurred vision. The history is provided by the patient. Review of External Medical Records:     Nursing Notes were reviewed. REVIEW OF SYSTEMS    (2-9 systems for level 4, 10 or more for level 5)     Review of Systems    Except as noted above the remainder of the review of systems was reviewed and negative.        PAST MEDICAL HISTORY     Past Medical History:   Diagnosis Date    Diabetes [facility])     Neurological disorder     migraines    Sarcoidosis     Stickler's syndrome     daughter has-pt may have (genetic connective tissue disorder)    Strabismus          SURGICAL HISTORY       Past Surgical History:   Procedure Laterality Date    DILATION AND CURETTAGE OF UTERUS      ORTHOPEDIC SURGERY Right     wrist    TONSILLECTOMY           CURRENT MEDICATIONS       Discharge Medication List as of 10/30/2023 11:47 AM        CONTINUE these medications which

## 2023-10-30 NOTE — ED TRIAGE NOTES
Patient arrives with c/o blurred vision in right eye, with change in right pupil. Further reports numbness in lips, and decreased sensation in right arm, and right leg. States sx began this morning at 800 upon waking up. Last known well: 2330 last night. Patient is A&O x 4. Dr. Shirlene Fry in triage assessing patient. Per Dr. Shirlene Fry no code stroke to be called at this time.

## 2024-01-04 NOTE — TELEPHONE ENCOUNTER
Pharmacy requesting refill of metformin ER 500MG tabs. Pharmacy correct on file. Please approve or deny refill request.

## 2024-01-05 RX ORDER — METFORMIN HYDROCHLORIDE 500 MG/1
500 TABLET, EXTENDED RELEASE ORAL
Qty: 90 TABLET | Refills: 0 | Status: SHIPPED | OUTPATIENT
Start: 2024-01-05

## 2024-04-01 RX ORDER — METFORMIN HYDROCHLORIDE 500 MG/1
TABLET, EXTENDED RELEASE ORAL
Qty: 90 TABLET | Refills: 0 | OUTPATIENT
Start: 2024-04-01

## 2024-04-12 RX ORDER — METFORMIN HYDROCHLORIDE 500 MG/1
500 TABLET, EXTENDED RELEASE ORAL
Qty: 90 TABLET | Refills: 0 | OUTPATIENT
Start: 2024-04-12

## 2024-04-16 ENCOUNTER — PATIENT MESSAGE (OUTPATIENT)
Age: 64
End: 2024-04-16

## 2024-04-17 RX ORDER — METFORMIN HYDROCHLORIDE 500 MG/1
500 TABLET, EXTENDED RELEASE ORAL
Qty: 30 TABLET | Refills: 0 | Status: SHIPPED | OUTPATIENT
Start: 2024-04-17

## 2024-04-17 RX ORDER — METFORMIN HYDROCHLORIDE 500 MG/1
500 TABLET, EXTENDED RELEASE ORAL
Qty: 90 TABLET | Refills: 0 | OUTPATIENT
Start: 2024-04-17

## 2024-05-16 ENCOUNTER — APPOINTMENT (OUTPATIENT)
Facility: HOSPITAL | Age: 64
End: 2024-05-16
Payer: COMMERCIAL

## 2024-05-16 ENCOUNTER — HOSPITAL ENCOUNTER (EMERGENCY)
Facility: HOSPITAL | Age: 64
Discharge: HOME | End: 2024-05-16
Attending: EMERGENCY MEDICINE
Payer: COMMERCIAL

## 2024-05-16 VITALS
WEIGHT: 167 LBS | SYSTOLIC BLOOD PRESSURE: 142 MMHG | RESPIRATION RATE: 20 BRPM | OXYGEN SATURATION: 92 % | DIASTOLIC BLOOD PRESSURE: 84 MMHG | HEART RATE: 90 BPM | TEMPERATURE: 97.9 F | HEIGHT: 64 IN | BODY MASS INDEX: 28.51 KG/M2

## 2024-05-16 DIAGNOSIS — R07.89 ATYPICAL CHEST PAIN: Primary | ICD-10-CM

## 2024-05-16 DIAGNOSIS — K44.9 HIATAL HERNIA: ICD-10-CM

## 2024-05-16 DIAGNOSIS — K21.9 GASTROESOPHAGEAL REFLUX DISEASE, UNSPECIFIED WHETHER ESOPHAGITIS PRESENT: ICD-10-CM

## 2024-05-16 LAB
ALBUMIN SERPL-MCNC: 3.9 G/DL (ref 3.5–5)
ALBUMIN/GLOB SERPL: 1 (ref 1.1–2.2)
ALP SERPL-CCNC: 85 U/L (ref 45–117)
ALT SERPL-CCNC: 20 U/L (ref 12–78)
ANION GAP SERPL CALC-SCNC: 7 MMOL/L (ref 5–15)
APTT PPP: 24 SEC (ref 22.1–31)
AST SERPL-CCNC: 19 U/L (ref 15–37)
BASOPHILS # BLD: 0.1 K/UL (ref 0–0.1)
BASOPHILS NFR BLD: 1 % (ref 0–1)
BILIRUB SERPL-MCNC: 0.6 MG/DL (ref 0.2–1)
BUN SERPL-MCNC: 11 MG/DL (ref 6–20)
BUN/CREAT SERPL: 16 (ref 12–20)
CALCIUM SERPL-MCNC: 9.2 MG/DL (ref 8.5–10.1)
CHLORIDE SERPL-SCNC: 108 MMOL/L (ref 97–108)
CK SERPL-CCNC: 52 U/L (ref 26–192)
CO2 SERPL-SCNC: 24 MMOL/L (ref 21–32)
COMMENT:: NORMAL
CREAT SERPL-MCNC: 0.68 MG/DL (ref 0.55–1.02)
DIFFERENTIAL METHOD BLD: ABNORMAL
EOSINOPHIL # BLD: 0.2 K/UL (ref 0–0.4)
EOSINOPHIL NFR BLD: 3 % (ref 0–7)
ERYTHROCYTE [DISTWIDTH] IN BLOOD BY AUTOMATED COUNT: 13.2 % (ref 11.5–14.5)
GLOBULIN SER CALC-MCNC: 3.9 G/DL (ref 2–4)
GLUCOSE SERPL-MCNC: 145 MG/DL (ref 65–100)
HCT VFR BLD AUTO: 41.6 % (ref 35–47)
HGB BLD-MCNC: 13.8 G/DL (ref 11.5–16)
IMM GRANULOCYTES # BLD AUTO: 0 K/UL (ref 0–0.04)
IMM GRANULOCYTES NFR BLD AUTO: 1 % (ref 0–0.5)
INR PPP: 1 (ref 0.9–1.1)
LIPASE SERPL-CCNC: 36 U/L (ref 13–75)
LIPASE SERPL-CCNC: 44 U/L (ref 13–75)
LYMPHOCYTES # BLD: 3.5 K/UL (ref 0.8–3.5)
LYMPHOCYTES NFR BLD: 43 % (ref 12–49)
MAGNESIUM SERPL-MCNC: 2.1 MG/DL (ref 1.6–2.4)
MCH RBC QN AUTO: 27.6 PG (ref 26–34)
MCHC RBC AUTO-ENTMCNC: 33.2 G/DL (ref 30–36.5)
MCV RBC AUTO: 83.2 FL (ref 80–99)
MONOCYTES # BLD: 0.6 K/UL (ref 0–1)
MONOCYTES NFR BLD: 7 % (ref 5–13)
NEUTS SEG # BLD: 3.8 K/UL (ref 1.8–8)
NEUTS SEG NFR BLD: 45 % (ref 32–75)
NRBC # BLD: 0 K/UL (ref 0–0.01)
NRBC BLD-RTO: 0 PER 100 WBC
NT PRO BNP: 19 PG/ML
PHOSPHATE SERPL-MCNC: 3.8 MG/DL (ref 2.6–4.7)
PLATELET # BLD AUTO: 247 K/UL (ref 150–400)
PMV BLD AUTO: 9 FL (ref 8.9–12.9)
POTASSIUM SERPL-SCNC: 3.6 MMOL/L (ref 3.5–5.1)
PROT SERPL-MCNC: 7.8 G/DL (ref 6.4–8.2)
PROTHROMBIN TIME: 10.7 SEC (ref 9–11.1)
RBC # BLD AUTO: 5 M/UL (ref 3.8–5.2)
SODIUM SERPL-SCNC: 139 MMOL/L (ref 136–145)
SPECIMEN HOLD: NORMAL
THERAPEUTIC RANGE: NORMAL SECS (ref 58–77)
TROPONIN I SERPL HS-MCNC: 4 NG/L (ref 0–51)
TROPONIN I SERPL HS-MCNC: <4 NG/L (ref 0–51)
WBC # BLD AUTO: 8.2 K/UL (ref 3.6–11)

## 2024-05-16 PROCEDURE — 6360000004 HC RX CONTRAST MEDICATION: Performed by: RADIOLOGY

## 2024-05-16 PROCEDURE — 99285 EMERGENCY DEPT VISIT HI MDM: CPT

## 2024-05-16 PROCEDURE — 83690 ASSAY OF LIPASE: CPT

## 2024-05-16 PROCEDURE — A4216 STERILE WATER/SALINE, 10 ML: HCPCS | Performed by: EMERGENCY MEDICINE

## 2024-05-16 PROCEDURE — 84484 ASSAY OF TROPONIN QUANT: CPT

## 2024-05-16 PROCEDURE — 85025 COMPLETE CBC W/AUTO DIFF WBC: CPT

## 2024-05-16 PROCEDURE — C9113 INJ PANTOPRAZOLE SODIUM, VIA: HCPCS | Performed by: EMERGENCY MEDICINE

## 2024-05-16 PROCEDURE — 83880 ASSAY OF NATRIURETIC PEPTIDE: CPT

## 2024-05-16 PROCEDURE — 85610 PROTHROMBIN TIME: CPT

## 2024-05-16 PROCEDURE — 36415 COLL VENOUS BLD VENIPUNCTURE: CPT

## 2024-05-16 PROCEDURE — 71045 X-RAY EXAM CHEST 1 VIEW: CPT

## 2024-05-16 PROCEDURE — 83735 ASSAY OF MAGNESIUM: CPT

## 2024-05-16 PROCEDURE — 71275 CT ANGIOGRAPHY CHEST: CPT

## 2024-05-16 PROCEDURE — 96374 THER/PROPH/DIAG INJ IV PUSH: CPT

## 2024-05-16 PROCEDURE — 84100 ASSAY OF PHOSPHORUS: CPT

## 2024-05-16 PROCEDURE — 85730 THROMBOPLASTIN TIME PARTIAL: CPT

## 2024-05-16 PROCEDURE — 2580000003 HC RX 258: Performed by: EMERGENCY MEDICINE

## 2024-05-16 PROCEDURE — 6360000002 HC RX W HCPCS: Performed by: EMERGENCY MEDICINE

## 2024-05-16 PROCEDURE — 80053 COMPREHEN METABOLIC PANEL: CPT

## 2024-05-16 PROCEDURE — 6370000000 HC RX 637 (ALT 250 FOR IP): Performed by: EMERGENCY MEDICINE

## 2024-05-16 PROCEDURE — 82550 ASSAY OF CK (CPK): CPT

## 2024-05-16 RX ORDER — SUCRALFATE 1 G/1
1 TABLET ORAL 4 TIMES DAILY PRN
Qty: 120 TABLET | Refills: 3 | Status: SHIPPED | OUTPATIENT
Start: 2024-05-16

## 2024-05-16 RX ORDER — HYDRALAZINE HYDROCHLORIDE 20 MG/ML
20 INJECTION INTRAMUSCULAR; INTRAVENOUS ONCE
Status: DISCONTINUED | OUTPATIENT
Start: 2024-05-16 | End: 2024-05-16

## 2024-05-16 RX ORDER — OMEPRAZOLE 40 MG/1
40 CAPSULE, DELAYED RELEASE ORAL DAILY
Qty: 30 CAPSULE | Refills: 0 | Status: SHIPPED | OUTPATIENT
Start: 2024-05-16 | End: 2024-06-15

## 2024-05-16 RX ORDER — ASPIRIN 81 MG/1
324 TABLET, CHEWABLE ORAL ONCE
Status: COMPLETED | OUTPATIENT
Start: 2024-05-16 | End: 2024-05-16

## 2024-05-16 RX ADMIN — ASPIRIN 324 MG: 81 TABLET, CHEWABLE ORAL at 02:05

## 2024-05-16 RX ADMIN — PANTOPRAZOLE SODIUM 40 MG: 40 INJECTION, POWDER, FOR SOLUTION INTRAVENOUS at 02:32

## 2024-05-16 RX ADMIN — ALUMINUM HYDROXIDE, MAGNESIUM HYDROXIDE, DIMETHICONE 40 ML: 400; 400; 40 SUSPENSION ORAL at 02:14

## 2024-05-16 RX ADMIN — IOPAMIDOL 85 ML: 755 INJECTION, SOLUTION INTRAVENOUS at 03:45

## 2024-05-16 ASSESSMENT — PAIN DESCRIPTION - ORIENTATION: ORIENTATION: MID

## 2024-05-16 ASSESSMENT — PAIN DESCRIPTION - LOCATION: LOCATION: CHEST

## 2024-05-16 ASSESSMENT — PAIN - FUNCTIONAL ASSESSMENT: PAIN_FUNCTIONAL_ASSESSMENT: 0-10

## 2024-05-16 ASSESSMENT — PAIN SCALES - GENERAL: PAINLEVEL_OUTOF10: 8

## 2024-05-16 NOTE — ED PROVIDER NOTES
for the following components:       Result Value    Immature Granulocytes % 1 (*)     All other components within normal limits   COMPREHENSIVE METABOLIC PANEL - Abnormal; Notable for the following components:    Glucose 145 (*)     Albumin/Globulin Ratio 1.0 (*)     All other components within normal limits   CK   TROPONIN   APTT   PROTIME-INR   BRAIN NATRIURETIC PEPTIDE   MAGNESIUM   PHOSPHORUS   EXTRA TUBES HOLD   LIPASE   LIPASE   TROPONIN       All other labs were within normal range or not returned as of this dictation.    EMERGENCY DEPARTMENT COURSE and DIFFERENTIAL DIAGNOSIS/MDM:   Vitals:    Vitals:    05/16/24 0156 05/16/24 0159 05/16/24 0302   BP:  (!) 183/104 112/66   Pulse: (!) 110 (!) 106 84   Resp:  13 18   Temp:  97.9 °F (36.6 °C)    TempSrc:  Oral    SpO2:  99% 92%   Weight:  75.8 kg (167 lb)    Height:  1.613 m (5' 3.5\")            Medical Decision Making  Assessment: 63-year-old female who presents to the ER for evaluation for persistent and sudden onset of midsternal chest pain and epigastric discomfort described as burning and tightness with a fairly benign exam with stable vital signs.  The patient is moderately hypertensive.  Differential diagnosis include ACS, GERD, VTE, pleurisy, pancreatitis, rule out metabolic derangement including electrolyte and dehydration.    Plan: Lab/EKG/chest x-ray/aspirin/GI cocktail/IV fluid/education, reassurance, symptomatic treatment/serial exam/ Monitor and Reevaluate.      Amount and/or Complexity of Data Reviewed  Labs: ordered.  Radiology: ordered.  ECG/medicine tests: ordered.    Risk  OTC drugs.  Prescription drug management.            REASSESSMENT      Progress Note:   Pt has been reexamined by Enzo Persaud MD. Pt is feeling much better. Symptoms have improved. All available results have been reviewed with pt and any available family.  The patient extensive workup without any significant cardiac findings.  CT scan positive for hiatal hernia.  The

## 2024-05-16 NOTE — ED TRIAGE NOTES
Pt arrives ambulatory to treatment area reporting chest pain which started at approx. 0115. Pt stating chest pain radiates into jaw.    Pt has hx acid reflux.

## 2024-05-20 ENCOUNTER — OFFICE VISIT (OUTPATIENT)
Age: 64
End: 2024-05-20
Payer: COMMERCIAL

## 2024-05-20 VITALS
HEART RATE: 110 BPM | BODY MASS INDEX: 27.9 KG/M2 | WEIGHT: 160 LBS | OXYGEN SATURATION: 96 % | RESPIRATION RATE: 16 BRPM | SYSTOLIC BLOOD PRESSURE: 145 MMHG | DIASTOLIC BLOOD PRESSURE: 83 MMHG

## 2024-05-20 DIAGNOSIS — Z12.31 ENCOUNTER FOR SCREENING MAMMOGRAM FOR MALIGNANT NEOPLASM OF BREAST: ICD-10-CM

## 2024-05-20 DIAGNOSIS — Z13.220 SCREENING CHOLESTEROL LEVEL: ICD-10-CM

## 2024-05-20 DIAGNOSIS — N39.3 STRESS INCONTINENCE OF URINE: ICD-10-CM

## 2024-05-20 DIAGNOSIS — E11.9 TYPE 2 DIABETES MELLITUS WITHOUT COMPLICATION, WITHOUT LONG-TERM CURRENT USE OF INSULIN (HCC): Primary | ICD-10-CM

## 2024-05-20 DIAGNOSIS — K44.9 HIATAL HERNIA: ICD-10-CM

## 2024-05-20 DIAGNOSIS — Z12.11 SCREEN FOR COLON CANCER: ICD-10-CM

## 2024-05-20 DIAGNOSIS — E55.9 VITAMIN D DEFICIENCY: ICD-10-CM

## 2024-05-20 PROCEDURE — 99214 OFFICE O/P EST MOD 30 MIN: CPT | Performed by: FAMILY MEDICINE

## 2024-05-20 PROCEDURE — 3044F HG A1C LEVEL LT 7.0%: CPT | Performed by: FAMILY MEDICINE

## 2024-05-20 RX ORDER — OLMESARTAN MEDOXOMIL 20 MG/1
20 TABLET ORAL DAILY
Qty: 90 TABLET | Refills: 1 | Status: SHIPPED | OUTPATIENT
Start: 2024-05-20

## 2024-05-20 NOTE — PROGRESS NOTES
.Last HbA1c -   Hemoglobin A1C   Date Value Ref Range Status   05/20/2024 5.8 (H) 4.0 - 5.6 % Final     Comment:     (NOTE)  HbA1C Interpretive Ranges  <5.7              Normal  5.7 - 6.4         Consider Prediabetes  >6.5              Consider Diabetes       Hemoglobin A1C, POC   Date Value Ref Range Status   10/17/2019 5.0 % Final      10. Last urine microalbulmin-  collecting today   11. Last comprehensive foot exam -  will update today   12. Did patient receive pneumococcal vaccine -2020  13. Does the patient have a nephrologist- n/a  14. Does the patient have a cardiologist-n/a   15. Seasonal influenza vaccine - yes gets yearly         Medications - reviewed:   Current Outpatient Medications   Medication Sig    olmesartan (BENICAR) 20 MG tablet Take 1 tablet by mouth daily    omeprazole (PRILOSEC) 40 MG delayed release capsule Take 1 capsule by mouth daily    metFORMIN (GLUCOPHAGE-XR) 500 MG extended release tablet Take 1 tablet by mouth Daily with supper    Cholecalciferol 50 MCG (2000 UT) CAPS Take 1 capsule by mouth 2 times daily    loratadine (CLARITIN) 10 MG tablet Take 1 tablet by mouth    pravastatin (PRAVACHOL) 40 MG tablet Take 1 tablet by mouth nightly     No current facility-administered medications for this visit.         Past Medical History - reviewed:  Past Medical History:   Diagnosis Date    Diabetes (HCC)     Neurological disorder     migraines    Sarcoidosis     Stickler's syndrome     daughter has-pt may have (genetic connective tissue disorder)    Strabismus          Past Surgical History - reviewed:   Past Surgical History:   Procedure Laterality Date    DILATION AND CURETTAGE OF UTERUS      ORTHOPEDIC SURGERY Right     wrist    TONSILLECTOMY         ROS  CONSTITUTIONAL: No fever, chills  PSYCH: Denies depression sx/low risk PHQ2 score       2/10/2023     1:09 PM 2/10/2023     1:04 PM 9/27/2021     2:51 PM   PHQ-9    Little interest or pleasure in doing things 0 0    Little interest or

## 2024-05-21 LAB
25(OH)D3 SERPL-MCNC: 29.4 NG/ML (ref 30–100)
CHOLEST SERPL-MCNC: 217 MG/DL
CREAT UR-MCNC: 118 MG/DL
EST. AVERAGE GLUCOSE BLD GHB EST-MCNC: 120 MG/DL
HBA1C MFR BLD: 5.8 % (ref 4–5.6)
HDLC SERPL-MCNC: 49 MG/DL
HDLC SERPL: 4.4 (ref 0–5)
LDLC SERPL CALC-MCNC: 125.6 MG/DL (ref 0–100)
MICROALBUMIN UR-MCNC: 2.33 MG/DL
MICROALBUMIN/CREAT UR-RTO: 20 MG/G (ref 0–30)
TRIGL SERPL-MCNC: 212 MG/DL
VLDLC SERPL CALC-MCNC: 42.4 MG/DL

## 2024-05-22 LAB
EKG ATRIAL RATE: 108 BPM
EKG ATRIAL RATE: 93 BPM
EKG DIAGNOSIS: NORMAL
EKG DIAGNOSIS: NORMAL
EKG P AXIS: 46 DEGREES
EKG P AXIS: 52 DEGREES
EKG P-R INTERVAL: 150 MS
EKG P-R INTERVAL: 152 MS
EKG Q-T INTERVAL: 320 MS
EKG Q-T INTERVAL: 342 MS
EKG QRS DURATION: 68 MS
EKG QRS DURATION: 72 MS
EKG QTC CALCULATION (BAZETT): 425 MS
EKG QTC CALCULATION (BAZETT): 428 MS
EKG R AXIS: 13 DEGREES
EKG R AXIS: 2 DEGREES
EKG T AXIS: 32 DEGREES
EKG T AXIS: 48 DEGREES
EKG VENTRICULAR RATE: 108 BPM
EKG VENTRICULAR RATE: 93 BPM

## 2024-05-24 DIAGNOSIS — E11.9 TYPE 2 DIABETES MELLITUS WITHOUT COMPLICATION, WITHOUT LONG-TERM CURRENT USE OF INSULIN (HCC): ICD-10-CM

## 2024-05-24 DIAGNOSIS — E55.9 VITAMIN D DEFICIENCY: Primary | ICD-10-CM

## 2024-05-24 RX ORDER — METFORMIN HYDROCHLORIDE 500 MG/1
1000 TABLET, EXTENDED RELEASE ORAL
Qty: 180 TABLET | Refills: 3 | Status: SHIPPED | OUTPATIENT
Start: 2024-05-24

## 2024-05-24 RX ORDER — ERGOCALCIFEROL 1.25 MG/1
50000 CAPSULE ORAL WEEKLY
Qty: 12 CAPSULE | Refills: 1 | Status: SHIPPED | OUTPATIENT
Start: 2024-05-24

## 2024-05-31 ENCOUNTER — PATIENT MESSAGE (OUTPATIENT)
Age: 64
End: 2024-05-31

## 2024-05-31 DIAGNOSIS — N39.3 STRESS INCONTINENCE OF URINE: Primary | ICD-10-CM

## 2024-06-05 ENCOUNTER — HOSPITAL ENCOUNTER (OUTPATIENT)
Facility: HOSPITAL | Age: 64
Discharge: HOME | End: 2024-06-08
Payer: COMMERCIAL

## 2024-06-05 ENCOUNTER — TRANSCRIBE ORDERS (OUTPATIENT)
Facility: HOSPITAL | Age: 64
End: 2024-06-05

## 2024-06-05 VITALS — WEIGHT: 160 LBS | BODY MASS INDEX: 28.35 KG/M2 | HEIGHT: 63 IN

## 2024-06-05 DIAGNOSIS — Z12.31 OTHER SCREENING MAMMOGRAM: ICD-10-CM

## 2024-06-05 DIAGNOSIS — Z12.31 OTHER SCREENING MAMMOGRAM: Primary | ICD-10-CM

## 2024-06-05 PROCEDURE — 77067 SCR MAMMO BI INCL CAD: CPT

## 2024-06-20 ENCOUNTER — PATIENT MESSAGE (OUTPATIENT)
Age: 64
End: 2024-06-20

## 2024-06-20 DIAGNOSIS — E11.9 TYPE 2 DIABETES MELLITUS WITHOUT COMPLICATION, WITHOUT LONG-TERM CURRENT USE OF INSULIN (HCC): ICD-10-CM

## 2024-06-21 NOTE — TELEPHONE ENCOUNTER
From: Vivien Greco  To: Dr. Sarah Hathaway  Sent: 6/20/2024 10:06 PM EDT  Subject: Olmesartan refill    Hi! I seem to be having issues with requesting refills on MyChart today ..  Could you please submit a refill of olmesartan  Thank you  Have a great day

## 2024-08-06 RX ORDER — PRAVASTATIN SODIUM 40 MG
40 TABLET ORAL NIGHTLY
Qty: 90 TABLET | Refills: 1 | Status: SHIPPED | OUTPATIENT
Start: 2024-08-06

## 2024-08-09 ENCOUNTER — PATIENT MESSAGE (OUTPATIENT)
Age: 64
End: 2024-08-09

## 2024-08-13 RX ORDER — PRAVASTATIN SODIUM 20 MG
20 TABLET ORAL NIGHTLY
Qty: 90 TABLET | Refills: 3 | Status: SHIPPED | OUTPATIENT
Start: 2024-08-13

## 2024-09-20 DIAGNOSIS — E11.9 TYPE 2 DIABETES MELLITUS WITHOUT COMPLICATION, WITHOUT LONG-TERM CURRENT USE OF INSULIN (HCC): ICD-10-CM

## 2024-09-25 RX ORDER — OLMESARTAN MEDOXOMIL 20 MG/1
20 TABLET ORAL DAILY
Qty: 90 TABLET | Refills: 1 | Status: SHIPPED | OUTPATIENT
Start: 2024-09-25

## 2025-03-11 DIAGNOSIS — E11.9 TYPE 2 DIABETES MELLITUS WITHOUT COMPLICATION, WITHOUT LONG-TERM CURRENT USE OF INSULIN: ICD-10-CM

## 2025-03-18 RX ORDER — METFORMIN HYDROCHLORIDE 500 MG/1
1000 TABLET, EXTENDED RELEASE ORAL
Qty: 180 TABLET | Refills: 0 | Status: SHIPPED | OUTPATIENT
Start: 2025-03-18

## 2025-04-01 DIAGNOSIS — E11.9 TYPE 2 DIABETES MELLITUS WITHOUT COMPLICATION, WITHOUT LONG-TERM CURRENT USE OF INSULIN: ICD-10-CM

## 2025-04-02 RX ORDER — OLMESARTAN MEDOXOMIL 20 MG/1
TABLET ORAL
Qty: 90 TABLET | Refills: 0 | Status: SHIPPED | OUTPATIENT
Start: 2025-04-02

## 2025-07-01 DIAGNOSIS — E11.9 TYPE 2 DIABETES MELLITUS WITHOUT COMPLICATION, WITHOUT LONG-TERM CURRENT USE OF INSULIN (HCC): ICD-10-CM

## 2025-07-01 RX ORDER — METFORMIN HYDROCHLORIDE 500 MG/1
1000 TABLET, EXTENDED RELEASE ORAL
Qty: 180 TABLET | Refills: 0 | Status: CANCELLED | OUTPATIENT
Start: 2025-07-01

## 2025-07-03 DIAGNOSIS — E11.9 TYPE 2 DIABETES MELLITUS WITHOUT COMPLICATION, WITHOUT LONG-TERM CURRENT USE OF INSULIN (HCC): ICD-10-CM

## 2025-07-03 NOTE — TELEPHONE ENCOUNTER
Medication Refill Request    Vivien Greco is requesting a refill of the following medication(s):   Requested Prescriptions     Pending Prescriptions Disp Refills    metFORMIN (GLUCOPHAGE-XR) 500 MG extended release tablet 180 tablet 0     Sig: Take 2 tablets by mouth Daily with supper    olmesartan (BENICAR) 20 MG tablet 90 tablet 0     Sig: Take one tab po daily, NEED APPT FOR ADDITIONAL REFILLS        Listed PCP is Sarah Hathaway DO   Last provider to prescribe medication: Rivas  Last Date of Medication Prescribed: 3/18/25, 4/2/25   Date of Last Office Visit at [facility]:  5/20/24  FUTURE APPOINTMENT: No future appointments.    Please send refill to:    [id number] [place], [place] - [address] [facility] [phone] - F [phone] [address] [place] [id number]  Phone: [phone] Fax: [phone]      Please review request and approve or deny with recommendations.

## 2025-07-08 ENCOUNTER — TELEPHONE (OUTPATIENT)
Age: 65
End: 2025-07-08

## 2025-07-08 DIAGNOSIS — E11.9 TYPE 2 DIABETES MELLITUS WITHOUT COMPLICATION, WITHOUT LONG-TERM CURRENT USE OF INSULIN (HCC): ICD-10-CM

## 2025-07-08 DIAGNOSIS — E78.5 HYPERLIPIDEMIA, UNSPECIFIED HYPERLIPIDEMIA TYPE: Primary | ICD-10-CM

## 2025-07-08 NOTE — TELEPHONE ENCOUNTER
I reached out to the patient to let her know that we were no longer in network with her current insurance. Patient is currently looking to enroll with Medicare in the next few months, she will reschedule with our office once that is finalized.    Patient is requesting a refill for three medications until she is able to reschedule:    metFORMIN (GLUCOPHAGE-XR) 500 MG extended release tablet  olmesartan (BENICAR) 20 MG tablet  pravastatin (PRAVACHOL) 20 MG tablet

## 2025-07-09 RX ORDER — OLMESARTAN MEDOXOMIL 20 MG/1
TABLET ORAL
Qty: 90 TABLET | Refills: 0 | Status: SHIPPED | OUTPATIENT
Start: 2025-07-09 | End: 2025-07-10 | Stop reason: SDUPTHER

## 2025-07-10 RX ORDER — OLMESARTAN MEDOXOMIL 20 MG/1
TABLET ORAL
Qty: 90 TABLET | Refills: 0 | Status: SHIPPED | OUTPATIENT
Start: 2025-07-10

## 2025-07-10 RX ORDER — PRAVASTATIN SODIUM 20 MG
20 TABLET ORAL NIGHTLY
Qty: 90 TABLET | Refills: 0 | Status: SHIPPED | OUTPATIENT
Start: 2025-07-10

## 2025-07-10 RX ORDER — METFORMIN HYDROCHLORIDE 500 MG/1
1000 TABLET, EXTENDED RELEASE ORAL
Qty: 180 TABLET | Refills: 0 | Status: SHIPPED | OUTPATIENT
Start: 2025-07-10